# Patient Record
Sex: MALE | Race: WHITE | Employment: OTHER | URBAN - METROPOLITAN AREA
[De-identification: names, ages, dates, MRNs, and addresses within clinical notes are randomized per-mention and may not be internally consistent; named-entity substitution may affect disease eponyms.]

---

## 2017-02-06 ENCOUNTER — OFFICE VISIT - RIVER FALLS (OUTPATIENT)
Dept: FAMILY MEDICINE | Facility: CLINIC | Age: 67
End: 2017-02-06

## 2017-02-06 ASSESSMENT — MIFFLIN-ST. JEOR: SCORE: 1460.71

## 2017-02-10 ENCOUNTER — OFFICE VISIT - RIVER FALLS (OUTPATIENT)
Dept: FAMILY MEDICINE | Facility: CLINIC | Age: 67
End: 2017-02-10

## 2017-05-10 ENCOUNTER — COMMUNICATION - RIVER FALLS (OUTPATIENT)
Dept: FAMILY MEDICINE | Facility: CLINIC | Age: 67
End: 2017-05-10

## 2017-05-10 ENCOUNTER — OFFICE VISIT - RIVER FALLS (OUTPATIENT)
Dept: FAMILY MEDICINE | Facility: CLINIC | Age: 67
End: 2017-05-10

## 2017-05-11 LAB — PSA SERPL-MCNC: 1.5 NG/ML

## 2017-05-16 LAB — PSA SERPL-MCNC: 1.5 NG/ML

## 2017-05-30 ENCOUNTER — OFFICE VISIT - RIVER FALLS (OUTPATIENT)
Dept: FAMILY MEDICINE | Facility: CLINIC | Age: 67
End: 2017-05-30

## 2017-06-28 ENCOUNTER — OFFICE VISIT - RIVER FALLS (OUTPATIENT)
Dept: FAMILY MEDICINE | Facility: CLINIC | Age: 67
End: 2017-06-28

## 2017-06-28 ASSESSMENT — MIFFLIN-ST. JEOR: SCORE: 1477.04

## 2017-08-30 ENCOUNTER — AMBULATORY - RIVER FALLS (OUTPATIENT)
Dept: FAMILY MEDICINE | Facility: CLINIC | Age: 67
End: 2017-08-30

## 2017-08-31 LAB — HBA1C MFR BLD: 7.4 %

## 2017-09-18 ENCOUNTER — OFFICE VISIT - RIVER FALLS (OUTPATIENT)
Dept: FAMILY MEDICINE | Facility: CLINIC | Age: 67
End: 2017-09-18

## 2017-09-18 ASSESSMENT — MIFFLIN-ST. JEOR: SCORE: 1432.59

## 2017-10-25 ENCOUNTER — OFFICE VISIT - RIVER FALLS (OUTPATIENT)
Dept: FAMILY MEDICINE | Facility: CLINIC | Age: 67
End: 2017-10-25

## 2017-10-25 ASSESSMENT — MIFFLIN-ST. JEOR: SCORE: 1410.82

## 2017-11-21 ENCOUNTER — OFFICE VISIT - RIVER FALLS (OUTPATIENT)
Dept: FAMILY MEDICINE | Facility: CLINIC | Age: 67
End: 2017-11-21

## 2017-12-20 ENCOUNTER — AMBULATORY - RIVER FALLS (OUTPATIENT)
Dept: FAMILY MEDICINE | Facility: CLINIC | Age: 67
End: 2017-12-20

## 2017-12-22 LAB
CHOLEST SERPL-MCNC: 143 MG/DL
CHOLEST/HDLC SERPL: 2.2 {RATIO}
CREAT SERPL-MCNC: 0.86 MG/DL (ref 0.7–1.25)
GLUCOSE BLD-MCNC: 142 MG/DL (ref 65–99)
HBA1C MFR BLD: 6.1 %
HDLC SERPL-MCNC: 64 MG/DL
LDLC SERPL CALC-MCNC: 62 MG/DL
NONHDLC SERPL-MCNC: 79 MG/DL
PSA SERPL-MCNC: 1 NG/ML
TRIGL SERPL-MCNC: 88 MG/DL

## 2017-12-26 ENCOUNTER — OFFICE VISIT - RIVER FALLS (OUTPATIENT)
Dept: FAMILY MEDICINE | Facility: CLINIC | Age: 67
End: 2017-12-26

## 2017-12-26 ASSESSMENT — MIFFLIN-ST. JEOR: SCORE: 1474.32

## 2018-01-02 ENCOUNTER — OFFICE VISIT - RIVER FALLS (OUTPATIENT)
Dept: FAMILY MEDICINE | Facility: CLINIC | Age: 68
End: 2018-01-02

## 2018-01-02 ASSESSMENT — MIFFLIN-ST. JEOR: SCORE: 1429.87

## 2018-01-05 ENCOUNTER — OFFICE VISIT - RIVER FALLS (OUTPATIENT)
Dept: FAMILY MEDICINE | Facility: CLINIC | Age: 68
End: 2018-01-05

## 2018-01-05 ASSESSMENT — MIFFLIN-ST. JEOR: SCORE: 1430.78

## 2018-01-19 ENCOUNTER — OFFICE VISIT - RIVER FALLS (OUTPATIENT)
Dept: FAMILY MEDICINE | Facility: CLINIC | Age: 68
End: 2018-01-19

## 2018-05-01 ENCOUNTER — OFFICE VISIT - RIVER FALLS (OUTPATIENT)
Dept: FAMILY MEDICINE | Facility: CLINIC | Age: 68
End: 2018-05-01

## 2018-05-01 ASSESSMENT — MIFFLIN-ST. JEOR: SCORE: 1417.17

## 2018-06-20 ENCOUNTER — OFFICE VISIT - RIVER FALLS (OUTPATIENT)
Dept: FAMILY MEDICINE | Facility: CLINIC | Age: 68
End: 2018-06-20
Payer: OTHER GOVERNMENT

## 2018-10-03 ENCOUNTER — OFFICE VISIT - RIVER FALLS (OUTPATIENT)
Dept: FAMILY MEDICINE | Facility: CLINIC | Age: 68
End: 2018-10-03

## 2018-10-03 ASSESSMENT — MIFFLIN-ST. JEOR: SCORE: 1409

## 2019-02-18 ENCOUNTER — AMBULATORY - RIVER FALLS (OUTPATIENT)
Dept: FAMILY MEDICINE | Facility: CLINIC | Age: 69
End: 2019-02-18

## 2019-02-19 LAB
BUN SERPL-MCNC: 13 MG/DL (ref 7–25)
BUN/CREAT RATIO - HISTORICAL: ABNORMAL (ref 6–22)
CALCIUM SERPL-MCNC: 9.5 MG/DL (ref 8.6–10.3)
CHLORIDE BLD-SCNC: 101 MMOL/L (ref 98–110)
CHOLEST SERPL-MCNC: 119 MG/DL
CHOLEST/HDLC SERPL: 2.2 {RATIO}
CO2 SERPL-SCNC: 32 MMOL/L (ref 20–32)
CREAT SERPL-MCNC: 0.82 MG/DL (ref 0.7–1.25)
EGFRCR SERPLBLD CKD-EPI 2021: 92 ML/MIN/1.73M2
ERYTHROCYTE [DISTWIDTH] IN BLOOD BY AUTOMATED COUNT: 12.4 % (ref 11–15)
GLUCOSE BLD-MCNC: 178 MG/DL (ref 65–99)
HBA1C MFR BLD: 7.8 %
HCT VFR BLD AUTO: 41.1 % (ref 38.5–50)
HDLC SERPL-MCNC: 55 MG/DL
HGB BLD-MCNC: 13.8 GM/DL (ref 13.2–17.1)
IRON SATURATION: 36 (ref 15–60)
IRON SERPL-MCNC: 121 MCG/DL (ref 50–180)
LDLC SERPL CALC-MCNC: 46 MG/DL
MCH RBC QN AUTO: 32.1 PG (ref 27–33)
MCHC RBC AUTO-ENTMCNC: 33.6 GM/DL (ref 32–36)
MCV RBC AUTO: 95.6 FL (ref 80–100)
NONHDLC SERPL-MCNC: 64 MG/DL
PLATELET # BLD AUTO: 285 10*3/UL (ref 140–400)
PMV BLD: 11.6 FL (ref 7.5–12.5)
POTASSIUM BLD-SCNC: 4.6 MMOL/L (ref 3.5–5.3)
PSA SERPL-MCNC: 0.7 NG/ML
RBC # BLD AUTO: 4.3 10*6/UL (ref 4.2–5.8)
SODIUM SERPL-SCNC: 139 MMOL/L (ref 135–146)
TIBC - QUEST: 332 (ref 250–425)
TRIGL SERPL-MCNC: 101 MG/DL
WBC # BLD AUTO: 5 10*3/UL (ref 3.8–10.8)

## 2019-02-21 ENCOUNTER — OFFICE VISIT - RIVER FALLS (OUTPATIENT)
Dept: FAMILY MEDICINE | Facility: CLINIC | Age: 69
End: 2019-02-21

## 2019-02-21 ASSESSMENT — MIFFLIN-ST. JEOR: SCORE: 1430.78

## 2019-06-03 ENCOUNTER — AMBULATORY - RIVER FALLS (OUTPATIENT)
Dept: FAMILY MEDICINE | Facility: CLINIC | Age: 69
End: 2019-06-03

## 2019-06-04 ENCOUNTER — COMMUNICATION - RIVER FALLS (OUTPATIENT)
Dept: FAMILY MEDICINE | Facility: CLINIC | Age: 69
End: 2019-06-04

## 2019-06-04 LAB — HBA1C MFR BLD: 6.9 %

## 2019-06-06 ENCOUNTER — OFFICE VISIT - RIVER FALLS (OUTPATIENT)
Dept: FAMILY MEDICINE | Facility: CLINIC | Age: 69
End: 2019-06-06

## 2019-06-06 ASSESSMENT — MIFFLIN-ST. JEOR: SCORE: 1427.15

## 2019-07-08 ENCOUNTER — OFFICE VISIT - RIVER FALLS (OUTPATIENT)
Dept: FAMILY MEDICINE | Facility: CLINIC | Age: 69
End: 2019-07-08

## 2019-07-08 ASSESSMENT — MIFFLIN-ST. JEOR: SCORE: 1440.76

## 2019-08-15 ENCOUNTER — OFFICE VISIT - RIVER FALLS (OUTPATIENT)
Dept: FAMILY MEDICINE | Facility: CLINIC | Age: 69
End: 2019-08-15

## 2019-11-05 ENCOUNTER — COMMUNICATION - RIVER FALLS (OUTPATIENT)
Dept: FAMILY MEDICINE | Facility: CLINIC | Age: 69
End: 2019-11-05

## 2019-12-16 ENCOUNTER — AMBULATORY - RIVER FALLS (OUTPATIENT)
Dept: FAMILY MEDICINE | Facility: CLINIC | Age: 69
End: 2019-12-16

## 2019-12-17 ENCOUNTER — COMMUNICATION - RIVER FALLS (OUTPATIENT)
Dept: FAMILY MEDICINE | Facility: CLINIC | Age: 69
End: 2019-12-17

## 2019-12-17 LAB — HBA1C MFR BLD: 7.4 %

## 2019-12-19 ENCOUNTER — OFFICE VISIT - RIVER FALLS (OUTPATIENT)
Dept: FAMILY MEDICINE | Facility: CLINIC | Age: 69
End: 2019-12-19

## 2019-12-19 ASSESSMENT — MIFFLIN-ST. JEOR: SCORE: 1453.46

## 2020-03-12 ENCOUNTER — AMBULATORY - RIVER FALLS (OUTPATIENT)
Dept: FAMILY MEDICINE | Facility: CLINIC | Age: 70
End: 2020-03-12

## 2020-03-19 ENCOUNTER — COMMUNICATION - RIVER FALLS (OUTPATIENT)
Dept: FAMILY MEDICINE | Facility: CLINIC | Age: 70
End: 2020-03-19

## 2020-06-04 ENCOUNTER — COMMUNICATION - RIVER FALLS (OUTPATIENT)
Dept: FAMILY MEDICINE | Facility: CLINIC | Age: 70
End: 2020-06-04

## 2020-06-11 ENCOUNTER — COMMUNICATION - RIVER FALLS (OUTPATIENT)
Dept: FAMILY MEDICINE | Facility: CLINIC | Age: 70
End: 2020-06-11

## 2020-06-11 ENCOUNTER — AMBULATORY - RIVER FALLS (OUTPATIENT)
Dept: FAMILY MEDICINE | Facility: CLINIC | Age: 70
End: 2020-06-11

## 2020-06-11 ASSESSMENT — MIFFLIN-ST. JEOR: SCORE: 1464.34

## 2020-06-12 ENCOUNTER — COMMUNICATION - RIVER FALLS (OUTPATIENT)
Dept: FAMILY MEDICINE | Facility: CLINIC | Age: 70
End: 2020-06-12

## 2020-06-12 LAB
B BURGDOR IGG+IGM SER QL: <0.9
BUN SERPL-MCNC: 14 MG/DL (ref 7–25)
BUN/CREAT RATIO - HISTORICAL: ABNORMAL (ref 6–22)
CALCIUM SERPL-MCNC: 9.7 MG/DL (ref 8.6–10.3)
CHLORIDE BLD-SCNC: 98 MMOL/L (ref 98–110)
CHOLEST SERPL-MCNC: 135 MG/DL
CHOLEST/HDLC SERPL: 2.6 {RATIO}
CO2 SERPL-SCNC: 34 MMOL/L (ref 20–32)
CREAT SERPL-MCNC: 0.93 MG/DL (ref 0.7–1.25)
EGFRCR SERPLBLD CKD-EPI 2021: 84 ML/MIN/1.73M2
ERYTHROCYTE [DISTWIDTH] IN BLOOD BY AUTOMATED COUNT: 12.3 % (ref 11–15)
GLUCOSE BLD-MCNC: 208 MG/DL (ref 65–99)
HBA1C MFR BLD: 8.4 %
HCT VFR BLD AUTO: 38.8 % (ref 38.5–50)
HDLC SERPL-MCNC: 52 MG/DL
HGB BLD-MCNC: 13.3 GM/DL (ref 13.2–17.1)
LDLC SERPL CALC-MCNC: 62 MG/DL
MCH RBC QN AUTO: 31.6 PG (ref 27–33)
MCHC RBC AUTO-ENTMCNC: 34.3 GM/DL (ref 32–36)
MCV RBC AUTO: 92.2 FL (ref 80–100)
NONHDLC SERPL-MCNC: 83 MG/DL
PLATELET # BLD AUTO: 261 10*3/UL (ref 140–400)
PMV BLD: 11.6 FL (ref 7.5–12.5)
POTASSIUM BLD-SCNC: 4.9 MMOL/L (ref 3.5–5.3)
PSA SERPL-MCNC: 0.7 NG/ML
RBC # BLD AUTO: 4.21 10*6/UL (ref 4.2–5.8)
SODIUM SERPL-SCNC: 138 MMOL/L (ref 135–146)
TRIGL SERPL-MCNC: 130 MG/DL
TSH SERPL DL<=0.005 MIU/L-ACNC: 0.72 MIU/L (ref 0.4–4.5)
WBC # BLD AUTO: 5.2 10*3/UL (ref 3.8–10.8)

## 2020-06-24 ENCOUNTER — OFFICE VISIT - RIVER FALLS (OUTPATIENT)
Dept: FAMILY MEDICINE | Facility: CLINIC | Age: 70
End: 2020-06-24

## 2020-06-24 ASSESSMENT — MIFFLIN-ST. JEOR: SCORE: 1448.92

## 2020-09-03 ENCOUNTER — COMMUNICATION - RIVER FALLS (OUTPATIENT)
Dept: FAMILY MEDICINE | Facility: CLINIC | Age: 70
End: 2020-09-03

## 2020-09-03 ENCOUNTER — OFFICE VISIT - RIVER FALLS (OUTPATIENT)
Dept: FAMILY MEDICINE | Facility: CLINIC | Age: 70
End: 2020-09-03

## 2020-09-03 ASSESSMENT — MIFFLIN-ST. JEOR: SCORE: 1444.39

## 2020-09-21 ENCOUNTER — AMBULATORY - RIVER FALLS (OUTPATIENT)
Dept: FAMILY MEDICINE | Facility: CLINIC | Age: 70
End: 2020-09-21

## 2020-09-22 ENCOUNTER — COMMUNICATION - RIVER FALLS (OUTPATIENT)
Dept: FAMILY MEDICINE | Facility: CLINIC | Age: 70
End: 2020-09-22

## 2020-09-22 LAB — HBA1C MFR BLD: 6.6 %

## 2020-09-23 ENCOUNTER — OFFICE VISIT - RIVER FALLS (OUTPATIENT)
Dept: FAMILY MEDICINE | Facility: CLINIC | Age: 70
End: 2020-09-23

## 2020-09-23 ASSESSMENT — MIFFLIN-ST. JEOR: SCORE: 1453.46

## 2021-02-10 ENCOUNTER — OFFICE VISIT - RIVER FALLS (OUTPATIENT)
Dept: FAMILY MEDICINE | Facility: CLINIC | Age: 71
End: 2021-02-10

## 2021-02-12 ENCOUNTER — AMBULATORY - RIVER FALLS (OUTPATIENT)
Dept: FAMILY MEDICINE | Facility: CLINIC | Age: 71
End: 2021-02-12

## 2021-02-12 ENCOUNTER — OFFICE VISIT - RIVER FALLS (OUTPATIENT)
Dept: FAMILY MEDICINE | Facility: CLINIC | Age: 71
End: 2021-02-12

## 2021-02-15 LAB — SARS-COV-2 RNA RESP QL NAA+PROBE: POSITIVE

## 2021-02-24 ENCOUNTER — OFFICE VISIT - RIVER FALLS (OUTPATIENT)
Dept: FAMILY MEDICINE | Facility: CLINIC | Age: 71
End: 2021-02-24

## 2021-04-06 ENCOUNTER — AMBULATORY - RIVER FALLS (OUTPATIENT)
Dept: FAMILY MEDICINE | Facility: CLINIC | Age: 71
End: 2021-04-06

## 2021-04-07 ENCOUNTER — COMMUNICATION - RIVER FALLS (OUTPATIENT)
Dept: FAMILY MEDICINE | Facility: CLINIC | Age: 71
End: 2021-04-07

## 2021-04-07 LAB
B BURGDOR IGG+IGM SER QL: <0.9
HBA1C MFR BLD: 7.4 %

## 2021-04-12 ENCOUNTER — OFFICE VISIT - RIVER FALLS (OUTPATIENT)
Dept: FAMILY MEDICINE | Facility: CLINIC | Age: 71
End: 2021-04-12

## 2021-04-12 ASSESSMENT — MIFFLIN-ST. JEOR: SCORE: 1461.17

## 2021-06-09 ENCOUNTER — OFFICE VISIT - RIVER FALLS (OUTPATIENT)
Dept: FAMILY MEDICINE | Facility: CLINIC | Age: 71
End: 2021-06-09

## 2021-06-09 ASSESSMENT — MIFFLIN-ST. JEOR: SCORE: 1408.55

## 2021-06-11 ENCOUNTER — COMMUNICATION - RIVER FALLS (OUTPATIENT)
Dept: FAMILY MEDICINE | Facility: CLINIC | Age: 71
End: 2021-06-11

## 2021-06-11 LAB
BACTERIA SPEC CULT: NORMAL
PSA SERPL-MCNC: 0.4 NG/ML

## 2021-07-12 ENCOUNTER — OFFICE VISIT - RIVER FALLS (OUTPATIENT)
Dept: FAMILY MEDICINE | Facility: CLINIC | Age: 71
End: 2021-07-12

## 2021-07-12 ASSESSMENT — MIFFLIN-ST. JEOR: SCORE: 1443.93

## 2021-07-19 ENCOUNTER — OFFICE VISIT - RIVER FALLS (OUTPATIENT)
Dept: FAMILY MEDICINE | Facility: CLINIC | Age: 71
End: 2021-07-19

## 2021-07-19 ASSESSMENT — MIFFLIN-ST. JEOR: SCORE: 1396.76

## 2021-09-20 ENCOUNTER — COMMUNICATION - RIVER FALLS (OUTPATIENT)
Dept: FAMILY MEDICINE | Facility: CLINIC | Age: 71
End: 2021-09-20

## 2021-09-23 ENCOUNTER — AMBULATORY - RIVER FALLS (OUTPATIENT)
Dept: FAMILY MEDICINE | Facility: CLINIC | Age: 71
End: 2021-09-23

## 2021-10-30 ENCOUNTER — AMBULATORY - RIVER FALLS (OUTPATIENT)
Dept: FAMILY MEDICINE | Facility: CLINIC | Age: 71
End: 2021-10-30

## 2021-10-31 LAB
BUN SERPL-MCNC: 15 MG/DL (ref 7–25)
BUN/CREAT RATIO - HISTORICAL: ABNORMAL (ref 6–22)
CALCIUM SERPL-MCNC: 9.5 MG/DL (ref 8.6–10.3)
CHLORIDE BLD-SCNC: 99 MMOL/L (ref 98–110)
CHOLEST SERPL-MCNC: 148 MG/DL
CHOLEST/HDLC SERPL: 2.2 {RATIO}
CO2 SERPL-SCNC: 31 MMOL/L (ref 20–32)
CREAT SERPL-MCNC: 0.91 MG/DL (ref 0.7–1.18)
CREAT UR-MCNC: 77 MG/DL (ref 20–320)
EGFRCR SERPLBLD CKD-EPI 2021: 85 ML/MIN/1.73M2
ERYTHROCYTE [DISTWIDTH] IN BLOOD BY AUTOMATED COUNT: 11.7 % (ref 11–15)
GLUCOSE BLD-MCNC: 183 MG/DL (ref 65–99)
HBA1C MFR BLD: 7.4 %
HCT VFR BLD AUTO: 40.4 % (ref 38.5–50)
HDLC SERPL-MCNC: 66 MG/DL
HGB BLD-MCNC: 13.7 GM/DL (ref 13.2–17.1)
IRON SATURATION: 23 (ref 20–48)
IRON SERPL-MCNC: 86 MCG/DL (ref 50–180)
LDLC SERPL CALC-MCNC: 66 MG/DL
MCH RBC QN AUTO: 32.7 PG (ref 27–33)
MCHC RBC AUTO-ENTMCNC: 33.9 GM/DL (ref 32–36)
MCV RBC AUTO: 96.4 FL (ref 80–100)
MICROALBUMIN UR-MCNC: 0.5 MG/DL
MICROALBUMIN/CREAT UR: 6 MG/G{CREAT}
NONHDLC SERPL-MCNC: 82 MG/DL
PLATELET # BLD AUTO: 270 10*3/UL (ref 140–400)
PMV BLD: 11.4 FL (ref 7.5–12.5)
POTASSIUM BLD-SCNC: 4.7 MMOL/L (ref 3.5–5.3)
PSA SERPL-MCNC: 0.66 NG/ML
RBC # BLD AUTO: 4.19 10*6/UL (ref 4.2–5.8)
SODIUM SERPL-SCNC: 136 MMOL/L (ref 135–146)
TIBC - QUEST: 369 (ref 250–425)
TRIGL SERPL-MCNC: 77 MG/DL
WBC # BLD AUTO: 5.8 10*3/UL (ref 3.8–10.8)

## 2021-11-01 ENCOUNTER — COMMUNICATION - RIVER FALLS (OUTPATIENT)
Dept: FAMILY MEDICINE | Facility: CLINIC | Age: 71
End: 2021-11-01

## 2021-11-01 ENCOUNTER — TRANSFERRED RECORDS (OUTPATIENT)
Dept: MULTI SPECIALTY CLINIC | Facility: CLINIC | Age: 71
End: 2021-11-01
Payer: OTHER GOVERNMENT

## 2021-11-01 LAB — RETINOPATHY: NORMAL

## 2021-11-02 ENCOUNTER — OFFICE VISIT - RIVER FALLS (OUTPATIENT)
Dept: FAMILY MEDICINE | Facility: CLINIC | Age: 71
End: 2021-11-02

## 2021-12-16 ENCOUNTER — COMMUNICATION - RIVER FALLS (OUTPATIENT)
Dept: FAMILY MEDICINE | Facility: CLINIC | Age: 71
End: 2021-12-16

## 2022-02-02 ENCOUNTER — OFFICE VISIT - RIVER FALLS (OUTPATIENT)
Dept: FAMILY MEDICINE | Facility: CLINIC | Age: 72
End: 2022-02-02
Payer: MEDICARE

## 2022-02-03 LAB
BUN SERPL-MCNC: 15 MG/DL (ref 7–25)
BUN/CREAT RATIO - HISTORICAL: ABNORMAL (ref 6–22)
CALCIUM SERPL-MCNC: 9.6 MG/DL (ref 8.6–10.3)
CHLORIDE BLD-SCNC: 95 MMOL/L (ref 98–110)
CO2 SERPL-SCNC: 31 MMOL/L (ref 20–32)
CREAT SERPL-MCNC: 0.87 MG/DL (ref 0.7–1.18)
EGFRCR SERPLBLD CKD-EPI 2021: 87 ML/MIN/1.73M2
ERYTHROCYTE [DISTWIDTH] IN BLOOD BY AUTOMATED COUNT: 12.4 % (ref 11–15)
GLUCOSE BLD-MCNC: 228 MG/DL (ref 65–99)
HCT VFR BLD AUTO: 41.2 % (ref 38.5–50)
HGB BLD-MCNC: 13.6 GM/DL (ref 13.2–17.1)
MCH RBC QN AUTO: 31.3 PG (ref 27–33)
MCHC RBC AUTO-ENTMCNC: 33 GM/DL (ref 32–36)
MCV RBC AUTO: 94.7 FL (ref 80–100)
PLATELET # BLD AUTO: 259 10*3/UL (ref 140–400)
PMV BLD: 12 FL (ref 7.5–12.5)
POTASSIUM BLD-SCNC: 4.9 MMOL/L (ref 3.5–5.3)
RBC # BLD AUTO: 4.35 10*6/UL (ref 4.2–5.8)
SODIUM SERPL-SCNC: 135 MMOL/L (ref 135–146)
TSH SERPL DL<=0.005 MIU/L-ACNC: 1.19 MIU/L (ref 0.4–4.5)
WBC # BLD AUTO: 5 10*3/UL (ref 3.8–10.8)

## 2022-02-10 ENCOUNTER — COMMUNICATION - RIVER FALLS (OUTPATIENT)
Dept: FAMILY MEDICINE | Facility: CLINIC | Age: 72
End: 2022-02-10
Payer: MEDICARE

## 2022-02-11 VITALS
SYSTOLIC BLOOD PRESSURE: 134 MMHG | HEIGHT: 67 IN | HEIGHT: 67 IN | SYSTOLIC BLOOD PRESSURE: 134 MMHG | OXYGEN SATURATION: 97 % | TEMPERATURE: 97.4 F | DIASTOLIC BLOOD PRESSURE: 75 MMHG | HEIGHT: 67 IN | SYSTOLIC BLOOD PRESSURE: 130 MMHG | HEART RATE: 76 BPM | TEMPERATURE: 97.6 F | DIASTOLIC BLOOD PRESSURE: 69 MMHG | HEART RATE: 78 BPM | WEIGHT: 149.5 LBS | DIASTOLIC BLOOD PRESSURE: 76 MMHG | WEIGHT: 159.9 LBS | HEART RATE: 73 BPM | BODY MASS INDEX: 23.46 KG/M2 | BODY MASS INDEX: 25.1 KG/M2 | BODY MASS INDEX: 23.87 KG/M2 | WEIGHT: 152.1 LBS

## 2022-02-11 VITALS
TEMPERATURE: 98.2 F | HEIGHT: 67 IN | TEMPERATURE: 102.6 F | BODY MASS INDEX: 26.15 KG/M2 | SYSTOLIC BLOOD PRESSURE: 136 MMHG | WEIGHT: 166.6 LBS | TEMPERATURE: 98.3 F | HEIGHT: 67 IN | DIASTOLIC BLOOD PRESSURE: 76 MMHG | WEIGHT: 156.4 LBS | HEART RATE: 80 BPM | WEIGHT: 157 LBS | HEIGHT: 67 IN | BODY MASS INDEX: 24.61 KG/M2 | HEART RATE: 78 BPM | DIASTOLIC BLOOD PRESSURE: 78 MMHG | SYSTOLIC BLOOD PRESSURE: 140 MMHG | OXYGEN SATURATION: 97 % | HEART RATE: 106 BPM | SYSTOLIC BLOOD PRESSURE: 140 MMHG | HEART RATE: 74 BPM | SYSTOLIC BLOOD PRESSURE: 124 MMHG | DIASTOLIC BLOOD PRESSURE: 84 MMHG | WEIGHT: 156.8 LBS | BODY MASS INDEX: 24.5 KG/M2 | DIASTOLIC BLOOD PRESSURE: 78 MMHG | BODY MASS INDEX: 24.64 KG/M2 | TEMPERATURE: 98.1 F

## 2022-02-11 VITALS
DIASTOLIC BLOOD PRESSURE: 72 MMHG | SYSTOLIC BLOOD PRESSURE: 120 MMHG | HEIGHT: 67 IN | WEIGHT: 157 LBS | BODY MASS INDEX: 24.64 KG/M2 | TEMPERATURE: 97.6 F | HEART RATE: 76 BPM

## 2022-02-11 VITALS
WEIGHT: 161 LBS | DIASTOLIC BLOOD PRESSURE: 76 MMHG | WEIGHT: 164.4 LBS | HEIGHT: 67 IN | HEART RATE: 76 BPM | BODY MASS INDEX: 25.8 KG/M2 | DIASTOLIC BLOOD PRESSURE: 73 MMHG | HEART RATE: 82 BPM | HEIGHT: 67 IN | TEMPERATURE: 97.1 F | BODY MASS INDEX: 25.27 KG/M2 | SYSTOLIC BLOOD PRESSURE: 117 MMHG | SYSTOLIC BLOOD PRESSURE: 122 MMHG

## 2022-02-11 VITALS
DIASTOLIC BLOOD PRESSURE: 70 MMHG | DIASTOLIC BLOOD PRESSURE: 76 MMHG | HEART RATE: 72 BPM | SYSTOLIC BLOOD PRESSURE: 122 MMHG | WEIGHT: 160 LBS | RESPIRATION RATE: 16 BRPM | TEMPERATURE: 97.4 F | BODY MASS INDEX: 25.43 KG/M2 | WEIGHT: 162 LBS | SYSTOLIC BLOOD PRESSURE: 124 MMHG | HEART RATE: 70 BPM | BODY MASS INDEX: 25.11 KG/M2 | TEMPERATURE: 97.5 F | HEIGHT: 67 IN | HEIGHT: 67 IN

## 2022-02-11 VITALS
OXYGEN SATURATION: 99 % | SYSTOLIC BLOOD PRESSURE: 122 MMHG | BODY MASS INDEX: 25.69 KG/M2 | DIASTOLIC BLOOD PRESSURE: 74 MMHG | WEIGHT: 163.7 LBS | TEMPERATURE: 97.3 F | HEIGHT: 67 IN | BODY MASS INDEX: 23.84 KG/M2 | HEIGHT: 67 IN | HEART RATE: 76 BPM

## 2022-02-11 VITALS
SYSTOLIC BLOOD PRESSURE: 122 MMHG | OXYGEN SATURATION: 99 % | WEIGHT: 161 LBS | TEMPERATURE: 97.2 F | SYSTOLIC BLOOD PRESSURE: 122 MMHG | BODY MASS INDEX: 24.99 KG/M2 | RESPIRATION RATE: 16 BRPM | BODY MASS INDEX: 25.22 KG/M2 | DIASTOLIC BLOOD PRESSURE: 66 MMHG | DIASTOLIC BLOOD PRESSURE: 64 MMHG | TEMPERATURE: 97.6 F | HEART RATE: 76 BPM | HEART RATE: 84 BPM | HEART RATE: 74 BPM | BODY MASS INDEX: 24.52 KG/M2 | HEIGHT: 67 IN | SYSTOLIC BLOOD PRESSURE: 106 MMHG | WEIGHT: 156.2 LBS | DIASTOLIC BLOOD PRESSURE: 76 MMHG | TEMPERATURE: 97.5 F | WEIGHT: 159.2 LBS | HEIGHT: 67 IN

## 2022-02-11 VITALS
SYSTOLIC BLOOD PRESSURE: 116 MMHG | HEIGHT: 67 IN | TEMPERATURE: 97.2 F | DIASTOLIC BLOOD PRESSURE: 74 MMHG | WEIGHT: 163.6 LBS | HEART RATE: 80 BPM | BODY MASS INDEX: 25.68 KG/M2

## 2022-02-11 VITALS
BODY MASS INDEX: 24.17 KG/M2 | TEMPERATURE: 97.2 F | HEART RATE: 100 BPM | WEIGHT: 154 LBS | HEIGHT: 67 IN | OXYGEN SATURATION: 97 % | DIASTOLIC BLOOD PRESSURE: 68 MMHG | RESPIRATION RATE: 16 BRPM | SYSTOLIC BLOOD PRESSURE: 126 MMHG

## 2022-02-11 VITALS
WEIGHT: 152.2 LBS | HEIGHT: 67 IN | TEMPERATURE: 97.6 F | SYSTOLIC BLOOD PRESSURE: 120 MMHG | HEART RATE: 72 BPM | DIASTOLIC BLOOD PRESSURE: 74 MMHG | BODY MASS INDEX: 23.89 KG/M2

## 2022-02-11 VITALS
DIASTOLIC BLOOD PRESSURE: 66 MMHG | BODY MASS INDEX: 23.95 KG/M2 | BODY MASS INDEX: 24.71 KG/M2 | SYSTOLIC BLOOD PRESSURE: 124 MMHG | SYSTOLIC BLOOD PRESSURE: 114 MMHG | WEIGHT: 152.6 LBS | HEART RATE: 74 BPM | WEIGHT: 157.4 LBS | TEMPERATURE: 98.3 F | HEIGHT: 67 IN | HEART RATE: 76 BPM | HEIGHT: 67 IN | DIASTOLIC BLOOD PRESSURE: 64 MMHG | TEMPERATURE: 97.1 F

## 2022-02-11 VITALS
HEIGHT: 67 IN | TEMPERATURE: 97.2 F | SYSTOLIC BLOOD PRESSURE: 120 MMHG | WEIGHT: 167.2 LBS | HEART RATE: 80 BPM | BODY MASS INDEX: 26.24 KG/M2 | DIASTOLIC BLOOD PRESSURE: 70 MMHG

## 2022-02-11 VITALS — BODY MASS INDEX: 25.22 KG/M2 | HEIGHT: 67 IN

## 2022-02-11 VITALS
TEMPERATURE: 97.9 F | BODY MASS INDEX: 25.43 KG/M2 | HEIGHT: 67 IN | WEIGHT: 162 LBS | SYSTOLIC BLOOD PRESSURE: 128 MMHG | DIASTOLIC BLOOD PRESSURE: 80 MMHG | HEART RATE: 72 BPM

## 2022-02-11 VITALS
WEIGHT: 164.4 LBS | DIASTOLIC BLOOD PRESSURE: 77 MMHG | BODY MASS INDEX: 25.75 KG/M2 | HEART RATE: 84 BPM | SYSTOLIC BLOOD PRESSURE: 142 MMHG

## 2022-02-11 VITALS
TEMPERATURE: 97.9 F | SYSTOLIC BLOOD PRESSURE: 124 MMHG | HEART RATE: 80 BPM | BODY MASS INDEX: 25.65 KG/M2 | WEIGHT: 163.8 LBS | DIASTOLIC BLOOD PRESSURE: 78 MMHG

## 2022-02-16 NOTE — TELEPHONE ENCOUNTER
Entered by Marilynn Parekh LPN on December 03, 2021 1:30:36 PM CST  ---------------------  From: Marilynn Parekh LPN   To: [facility]    Sent: 12/3/2021 1:30:36 PM CST  Subject: Medication Management     ** Not Approved: Patient needs appointment **  modafinil (MODAFINIL 200MG TABLET)  TAKE 1 TABLET BY MOUTH EVERY MORNING **[facility]  Qty:  30 tab(s)        Days Supply:  30        Refills:  0          Substitutions Allowed     Route To Pharmacy - [facility]   Signed by Marilynn Parekh LPN            Per [facility] note 11/5/21 pt needs appt for consult with KAMINI or NOHELIA.    ------------------------------------------  From: [employer]  To: Kolton Majano MD  Sent: November 29, 2021 3:17:37 PM CST  Subject: Medication Management  Due: November 16, 2021 3:37:48 PM CST     ** On Hold Pending Signature **     Drug: modafinil (modafinil 200 mg oral tablet), TAKE 1 TABLET BY MOUTH EVERY MORNING **[facility]  Quantity: 30 tab(s)  Days Supply: 30  Refills: 0  Substitutions Allowed  Notes from Pharmacy:     Dispensed Drug: modafinil (modafinil 200 mg oral tablet), TAKE 1 TABLET BY MOUTH EVERY MORNING **[facility]  Quantity: 30 tab(s)  Days Supply: 30  Refills: 0  Substitutions Allowed  Notes from Pharmacy:  ------------------------------------------

## 2022-02-16 NOTE — TELEPHONE ENCOUNTER
---------------------  From: Teresa Reddy CMA (eRx Pool ([id number]_[facility]))   To: [facility] Message Pool (32224_[facility]);     Sent: 2/11/2021 8:06:10 AM CST  Subject: FW: Medication Management   Due Date/Time: 2/11/2021 9:50:00 AM CST           Entered by Teresa Reddy CMA on February 11, 2021 8:05:53 AM CST    PCP:  JESSICA    Date of last office visit and reason:  Cough 2/10/2021    Return to Clinic order placed? 6/2021         ------------------------------------------  From: [employer]  To: Misael Crow PA-C  Sent: February 10, 2021 9:50:53 AM CST  Subject: Medication Management  Due: January 30, 2021 4:21:54 PM CST     ** On Hold Pending Signature **     Drug: hydrocortisone/neomycin/polymyxin B otic (hydrocortisone/neomycin/polymyxin B 1%-0.35%-10,000 units/mL otic suspension), INSTILL FOUR DROPS IN EACH EAR THREE TIMES A DAY FOR SEVEN DAY(S)  Quantity: 10 mL  Days Supply: 7  Refills: 0  Substitutions Allowed  Notes from Pharmacy:     Dispensed Drug: hydrocortisone/neomycin/polymyxin B otic (hydrocortisone/neomycin/polymyxin B 1%-0.35%-10,000 units/mL otic suspension), INSTILL FOUR DROPS IN EACH EAR THREE TIMES A DAY FOR SEVEN DAY(S)  Quantity: 10 mL  Days Supply: 7  Refills: 0  Substitutions Allowed  Notes from Pharmacy:  ---------------------------------------------------------------  From: Alberto Palumbo CMA ([facility] Message Pool ([id number]_[facility]))   To: Misael Crow PA-C;     Sent: 2/11/2021 11:19:42 AM CST  Subject: FW: Medication Management   Due Date/Time: 2/11/2021 9:50:00 AM CST---------------------  From: Tristin MORELOS, Misael CHAKRABORTY   To: [facility]    Sent: 2/11/2021 11:43:44 AM CST  Subject: FW: Medication Management     ** Approved with modifications: **  hydrocortisone/neomycin/polymyxin B otic (NEOMYCIN/POLYMYXIN/HYDROCORTISONE 1% OTIC SUSPENSION)  INSTILL FOUR DROPS IN EACH EAR THREE TIMES A DAY FOR SEVEN DAY(S)  Qty:  10 mL        Days Supply:  7        Refills:  0           Substitutions Allowed     Route To Pharmacy - [facility]

## 2022-02-16 NOTE — TELEPHONE ENCOUNTER
Entered by Alberto Palumbo CMA on June 04, 2020 9:23:05 AM CDT  PCP:   ASHVIN    Medication:   lomotil  Last Filled:  12/3/19    Quantity:  90  Refills:  5    Date of last office visit and reason:   12/19/19 medication management    Date of last Med Check / Px:     Date of last labs pertaining to condition:      Note:  Please advise on refills.  Alberto Palumbo CMa    Return to Clinic order placed?  yes due for DM check 10/2020    Resource:   _  Phone:   _  Fax:  _          ** Patient matched by Alberto Palumbo CMA on 6/4/2020 9:20:05 AM CDT **      ------------------------------------------  From: [employer]  To: Kolton Majano MD  Sent: Rosaura 3, 2020 3:09:19 PM CDT  Subject: Medication Management  Due: May 30, 2020 3:27:43 PM CDT     ** On Hold Pending Signature **     Dispensed Drug: atropine-diphenoxylate (atropine-diphenoxylate 0.025 mg-2.5 mg oral tablet), TAKE 1 TABLET BY MOUTH 3 TIMES A DAY  Quantity: 90 unknown unit  Days Supply: 0  Refills: 0  Substitutions Allowed  Notes from Pharmacy:  ---------------------------------------------------------------  From: Alberto Palumbo CMA (eRx Pool ([id number]_[place]))   To: [facility] Message Pool ([facility]);     Sent: 6/4/2020 9:23:18 AM CDT  Subject: FW: Medication Management   Due Date/Time: 6/4/2020 3:09:00 PM CDT---------------------  From: Keisha Jolly ([employer] Message Pool ([id number]_[place] [facility] [place]))   To: Kolton Majano MD;     Sent: 6/4/2020 11:27:54 AM CDT  Subject: FW: Medication Management   Due Date/Time: 6/4/2020 3:09:00 PM CDT---------------------  From: Kolton Majano MD   To: [facility] [email or web address] Pool ([id number]_[place] [facility] [place]);     Sent: 6/4/2020 11:43:42 AM CDT  Subject: RE: Medication Management     Prescription sent to pharmacy

## 2022-02-16 NOTE — TELEPHONE ENCOUNTER
---------------------  From: Jennifer Crisostomo   To: Kolton Majano MD;     Cc: Blanca Chowdhury CMA;      Sent: 3/5/2020 10:05:04 AM CST  Subject: General Message     Patient received a reminder letter stating that he is due for a CXR and would like to have today.  Please place an order.---------------------  From: Kolton Majano MD   To: Jennifer Crisostomo; Blanca Chowdhury CMA;     Sent: 3/5/2020 10:25:14 AM CST  Subject: RE: General Message     Theodora  Order placed. Please call patientorder was brought to referrals.       RUDDY Chowdhury CMA

## 2022-02-16 NOTE — NURSING NOTE
Patient called at 8:42 asking if he needed fasting labs for his Nov appointment with ASHVIN.  I reviewed the chart and it showed he only needed an A1c.  Informed patient at 8:45.

## 2022-02-16 NOTE — NURSING NOTE
Comprehensive Intake Entered On:  12/19/2019 3:09 PM CST    Performed On:  12/19/2019 3:02 PM CST by Qian ESPINAL, Ina               Summary   Chief Complaint :   med check, review labs.   Weight Measured :   162 lb(Converted to: 162 lb 0 oz, 73.48 kg)    Height Measured :   67 in(Converted to: 5 ft 7 in, 170.18 cm)    Body Mass Index :   25.37 kg/m2 (HI)    Body Surface Area :   1.86 m2   Systolic Blood Pressure :   128 mmHg   Diastolic Blood Pressure :   80 mmHg   Mean Arterial Pressure :   96 mmHg   Peripheral Pulse Rate :   72 bpm   BP Site :   Right arm   Pulse Site :   Radial artery   BP Method :   Manual   HR Method :   Manual   Temperature Tympanic :   97.9 DegF(Converted to: 36.6 DegC)    Ina Laughlin MA - 12/19/2019 3:02 PM CST   Health Status   Allergies Verified? :   Yes   Medication History Verified? :   Yes   Immunizations Current :   No   Medical History Verified? :   Yes   Pre-Visit Planning Status :   Completed   Tobacco Use? :   Current every day smoker   Ina Laughlin MA - 12/19/2019 3:02 PM CST   Consents   Consent for Immunization Exchange :   Consent Granted   Consent for Immunizations to Providers :   Consent Granted   Ina Laughlin MA - 12/19/2019 3:02 PM CST   Meds / Allergies   (As Of: 12/19/2019 3:09:20 PM CST)   Allergies (Active)   No known allergies  Estimated Onset Date:   Unspecified ; Created By:   Gena Adan LPN; Reaction Status:   Active ; Category:   Drug ; Substance:   No known allergies ; Type:   Allergy ; Updated By:   Gena Adan LPN; Reviewed Date:   6/6/2019 2:58 PM CDT        Medication List   (As Of: 12/19/2019 3:09:20 PM CST)   Prescription/Discharge Order    atropine-diphenoxylate  :   atropine-diphenoxylate ; Status:   Prescribed ; Ordered As Mnemonic:   Lomotil 2.5 mg-0.025 mg oral tablet ; Simple Display Line:   1 tab(s), po, tid, 90 tab(s), 5 Refill(s) ; Ordering Provider:   Kolton Majano MD; Catalog Code:   atropine-diphenoxylate ; Order Dt/Tm:    12/3/2019 3:55:22 PM CST          bromocriptine  :   bromocriptine ; Status:   Prescribed ; Ordered As Mnemonic:   bromocriptine 0.8 mg oral tablet ; Simple Display Line:   0.8 mg, 1 tab(s), po, qam, 95 tab(s), 3 Refill(s) ; Ordering Provider:   Kolton Majano MD; Catalog Code:   bromocriptine ; Order Dt/Tm:   2/21/2019 3:08:16 PM CST          cholestyramine  :   cholestyramine ; Status:   Completed ; Ordered As Mnemonic:   Prevalite 4 g/5.5 g oral powder for reconstitution ; Simple Display Line:   4 gm, po, daily, 90 EA, 3 Refill(s) ; Ordering Provider:   Kolton Majnao MD; Catalog Code:   cholestyramine ; Order Dt/Tm:   9/18/2017 4:44:38 PM CDT          dutasteride  :   dutasteride ; Status:   Prescribed ; Ordered As Mnemonic:   Avodart 0.5 mg oral capsule ; Simple Display Line:   0.5 mg, 1 cap(s), PO, Daily, 30 cap(s), 1 Refill(s) ; Ordering Provider:   Kolton Majano MD; Catalog Code:   dutasteride ; Order Dt/Tm:   8/30/2017 7:29:29 PM CDT          ipratropium nasal  :   ipratropium nasal ; Status:   Prescribed ; Ordered As Mnemonic:   Atrovent 42 mcg/inh nasal spray ; Simple Display Line:   2 spray(s), nasal, qid, PRN: for nasal congestion, 1 EA, 2 Refill(s) ; Ordering Provider:   Misael Crow PA-C; Catalog Code:   ipratropium nasal ; Order Dt/Tm:   5/1/2018 10:02:36 AM CDT          lidocaine topical  :   lidocaine topical ; Status:   Prescribed ; Ordered As Mnemonic:   lidocaine 5% topical ointment ; Simple Display Line:   1 nini, top, tid, 35 gm, 3 Refill(s) ; Ordering Provider:   Kolton Majano MD; Catalog Code:   lidocaine topical ; Order Dt/Tm:   2/21/2019 3:12:20 PM CST          metFORMIN  :   metFORMIN ; Status:   Prescribed ; Ordered As Mnemonic:   metFORMIN 500 mg oral tablet, extended release ; Simple Display Line:   4 tab(s), Oral, daily, 380 tab(s), 3 Refill(s) ; Ordering Provider:   Kolton Majano MD; Catalog Code:   metFORMIN ; Order Dt/Tm:   2/21/2019 3:07:51 PM CST          sildenafil  :    sildenafil ; Status:   Prescribed ; Ordered As Mnemonic:   sildenafil 20 mg oral tablet ; Simple Display Line:   2.5 tab(s), Oral, daily, 30-60 MINUTES PRIOR TO SEXUAL ACTIVITY., PRN: AS NEEDED, 10 tab(s), 10 Refill(s) ; Ordering Provider:   Kolton Majano MD; Catalog Code:   sildenafil ; Order Dt/Tm:   2/21/2019 3:07:32 PM CST          triamcinolone topical  :   triamcinolone topical ; Status:   Prescribed ; Ordered As Mnemonic:   triamcinolone 0.1% topical cream ; Simple Display Line:   1 nini, TOP, bid, use for no more than 2wks at a time, 60 gm, 1 Refill(s) ; Ordering Provider:   Kolton Majano MD; Catalog Code:   triamcinolone topical ; Order Dt/Tm:   9/18/2017 4:47:11 PM CDT            Home Meds    acetaminophen  :   acetaminophen ; Status:   Documented ; Ordered As Mnemonic:   acetaminophen ; Simple Display Line:   325 mg, po, q4 hrs, PRN: as needed for pain ; Catalog Code:   acetaminophen ; Order Dt/Tm:   7/1/2014 1:21:25 PM CDT          acetaminophen-oxycodone  :   acetaminophen-oxycodone ; Status:   Documented ; Ordered As Mnemonic:   acetaminophen-oxycodone 325 mg-5 mg oral tablet ; Simple Display Line:   1 tab(s), po, daily, PRN: as needed for pain ; Catalog Code:   acetaminophen-oxycodone ; Order Dt/Tm:   9/5/2012 2:11:41 PM CDT          biotin  :   biotin ; Status:   Documented ; Ordered As Mnemonic:   biotin 300 mcg oral tablet ; Simple Display Line:   300 mcg, 1 tab(s), po, daily ; Catalog Code:   biotin ; Order Dt/Tm:   7/1/2014 1:23:25 PM CDT          ibuprofen  :   ibuprofen ; Status:   Documented ; Ordered As Mnemonic:   ibuprofen 600 mg oral tablet ; Simple Display Line:   600 mg, 1 tab(s), po, daily, PRN: as needed for pain ; Catalog Code:   ibuprofen ; Order Dt/Tm:   9/5/2012 2:09:45 PM CDT          Miscellaneous Prescription  :   Miscellaneous Prescription ; Status:   Documented ; Ordered As Mnemonic:   Vitamin C ; Simple Display Line:   See Instructions, 1000mg tablet po bid ; Catalog  Code:   Miscellaneous Prescription ; Order Dt/Tm:   7/1/2014 1:22:31 PM CDT          modafinil  :   modafinil ; Status:   Documented ; Ordered As Mnemonic:   modafinil 200 mg oral tablet ; Simple Display Line:   200 mg, 1 tab(s), po, qam, PRN ; Catalog Code:   modafinil ; Order Dt/Tm:   2/6/2014 5:07:37 PM CST          venlafaxine  :   venlafaxine ; Status:   Documented ; Ordered As Mnemonic:   venlafaxine 75 mg oral capsule, extended release ; Simple Display Line:   75 mg, 1 cap(s), PO, Daily, 30 cap(s) ; Catalog Code:   venlafaxine ; Order Dt/Tm:   9/5/2012 2:09:18 PM CDT            Social History   Social History   (As Of: 12/19/2019 3:09:20 PM CST)   Alcohol:        Current, Beer (12 oz), 1-2 times per month, 2 drinks/episode average.   (Last Updated: 6/5/2014 6:36:23 PM CDT by Elsie Sellers CMA)          Tobacco:        Current, Snuff - 1/2 can per day, 25 year(s).   (Last Updated: 7/1/2014 1:00:11 PM CDT by Elsie Sellers CMA)          Substance Abuse:        Never   (Last Updated: 10/9/2012 9:09:03 AM CDT by Yaa Tillman)          Employment/School:        Retired, Work/School description: Lt. Col., [employer].   (Last Updated: 10/9/2012 9:10:02 AM CDT by Yaa Tillman)          Exercise:        Exercise frequency: 1-2 times/week.  Exercise type: Walking, Weight lifting.   (Last Updated: 10/9/2012 9:08:01 AM CDT by Yaa Tillman)          Sexual:        Sexually active: No.  Sexual orientation: Heterosexual.   (Last Updated: 10/9/2012 9:06:24 AM CDT by Yaa Tillman)

## 2022-02-16 NOTE — TELEPHONE ENCOUNTER
---------------------  From: Bety Neri CMA (eRx Pool (32224_[facility]))   To: [facility] Message Pool (32[facility]_[place] [facility] [place]);     Sent: 12/1/2020 7:11:39 AM CST  Subject: FW: Medication Management   Due Date/Time: 12/1/2020 2:43:00 PM CST           ------------------------------------------  From: [employer]  To: Kolton Majano MD  Sent: November 30, 2020 2:43:53 PM CST  Subject: Medication Management  Due: November 26, 2020 4:59:30 PM CST     ** On Hold Pending Signature **     Drug: atropine-diphenoxylate (Lomotil 2.5 mg-0.025 mg oral tablet), TAKE ONE TABLET BY MOUTH THREE TIMES A DAY  Quantity: 90 EA  Days Supply: 30  Refills: 5  Substitutions Allowed  Notes from Pharmacy:     Dispensed Drug: atropine-diphenoxylate (atropine-diphenoxylate 0.025 mg-2.5 mg oral tablet), TAKE ONE TABLET BY MOUTH THREE TIMES A DAY  Quantity: 90 EA  Days Supply: 30  Refills: 5  Substitutions Allowed  Notes from Pharmacy:  ------------------------------------------6/4/2020 for #90 refill 5, pt should have refills---------------------  From: Blanca Chowdhury CMA   To: [facility]    Sent: 12/7/2020 8:30:18 AM CST  Subject: FW: Medication Management     ** Not Approved: Patient has requested refill too soon, refilled 6/4/20 for #90 refill 5 **  atropine-diphenoxylate (DIPHENOXYLATE HYDROCHLORIDE/ATROPINE SULFATE  TABLET)  TAKE ONE TABLET BY MOUTH THREE TIMES A DAY  Qty:  90 EA        Days Supply:  30        Refills:  5          Substitutions Allowed     Route To Pharmacy - [facility]   Signed by Blanca Chowdhury CMA

## 2022-02-16 NOTE — PROGRESS NOTES
Patient:   NOY CRUZ            MRN: [id number]            FIN: [id number]               Age:   67 years     Sex:  Male     :  1950   Associated Diagnoses:   Chronic diarrhea; History of anemia; Diabetes Mellitus; Pruritus, Perianal   Author:   Kolton Majano MD      Visit Information      Date of Service: 10/25/2017 02:07 pm  Performing Location: [facility]  Encounter#: [id number]      Primary Care Provider (PCP):  Kolton Majano MD    NPI# [id number]      Referring Provider:  Kolton Majano MD    NPI# [id number]      Chief Complaint   10/25/2017 2:12 PM CDT   Wants to discuss medication.      History of Present Illness   Requests bromocriptine for diabetes. Brings in article talking about using it to help lower the HgbA1c with metformin. GI side effects common but no other significant concerns.      Tried quitting chew on own. Tried nicorrette. Wants to quit and concerned that chewing could have contributed to the colon polyps. Chews copehagen 1-2 cans a week. Chewing for 30 years. Used an herbal supplement and gum. Using nicorette lozenges.    Had hemorrhoids surgically fixed . Continues to have pain and itching intermittently but less frequently. When has rectal pain 1x a week triamcinolone 0.1% cream and lidocaine helps.    Had loose stools 3-4x a day until starting cholestyramine. Now has 1-2 stools usually formed. Still taking lomotil 6 tabs a day. Loose stools started following the right hemicolectomy . Still has gallbladder.    Takes percocet about 4-6 tabs a month for various chronic pains. [facility] Mejia)  Uses ibuprofen 3-4x a week.    Diabetes diagnosed in .  Everything hurts when walks a mile. Most pain in left achilles since injury .  Has lost 10 lbs recently  Takes modafinil for sleep apnea and [facility] by [facility]  Takes effexor  Prostate 8.5 in  with normal biopsies         Review of Systems   Constitutional:  No fever.     Respiratory:  No shortness of breath.    Cardiovascular:  No chest pain.    Gastrointestinal:  No vomiting.    PHQ-9: 7pts (September 2017). 2pts (February 2016)  Eczema in the winter  CAGE: 0pts and minimal alcohol  Has some occasional black stools and not taking iron      Health Status   Allergies:    Allergic Reactions (Selected)  No known allergies   Medications:  (Selected)   Prescriptions  Prescribed  Avodart 0.5 mg oral capsule: 1 cap(s) ( 0.5 mg ), PO, Daily, # 30 cap(s), 1 Refill(s), Type: Maintenance, Pharmacy: [facility], 1 cap(s) po daily  Lomotil 2.5 mg-0.025 mg oral tablet: 1-2 tab(s), po, qid, # 180 tab(s), 5 Refill(s), Type: Maintenance, Pharmacy: [facility], [place], 1-2 tab(s) po qid  Prevalite 4 g/5.5 g oral powder for reconstitution: ( 4 gm ), po, daily, # 90 EA, 3 Refill(s), Type: Maintenance, Pharmacy: [facility], 4 gm po daily  Viagra 100 mg oral tablet: 0.5 tab(s) ( 50 mg ), po, daily, Instructions: take when needed., # 10 tab(s), 3 Refill(s), Type: Maintenance, Pharmacy: [facility], 0.5 tab(s) po daily,Instr:take when needed.  lidocaine 5% topical ointment: 1 nini, top, tid, # 35 gm, 3 Refill(s), Type: Maintenance, Pharmacy: [facility], 1 nini top tid  metFORMIN 500 mg oral tablet, extended release: 4 tab(s) ( 2,000 mg ), po, daily, # 370 tab(s), 3 Refill(s), Type: Maintenance, Pharmacy: [facility], Pt is overdue for DM check and labs--must be seen for further refills., 4 tab(s) po daily  triamcinolone 0.1% topical cream: 1 nini, TOP, bid, Instructions: use for no more than 2wks at a time, # 60 gm, 1 Refill(s), Type: Maintenance, Pharmacy: [facility], 1 niin top bid,Instr:use for no more than 2wks at a time  Documented Medications  Documented  Vitamin C: Vitamin C, See Instructions, Instructions: 1000mg tablet po bid, Supply, 0 Refill(s), Type: Maintenance  acetaminophen-oxycodone 325 mg-5 mg oral tablet: 1 tab(s), po, daily, PRN: as needed for pain, 0 Refill(s), Type:  Maintenance  acetaminophen: ( 325 mg ), po, q4 hrs, PRN: as needed for pain, 0 Refill(s), Type: Maintenance  biotin 300 mcg oral tablet: 1 tab(s) ( 300 mcg ), po, daily, 0 Refill(s), Type: Maintenance  ibuprofen 600 mg oral tablet: 1 tab(s) ( 600 mg ), po, daily, PRN: as needed for pain, 0 Refill(s), Type: Maintenance  modafinil 200 mg oral tablet: 1 tab(s) ( 200 mg ), po, qam, Instructions: PRN, 0 Refill(s), Type: Maintenance  venlafaxine 75 mg oral capsule, extended release: 1 cap(s) ( 75 mg ), PO, Daily, # 30 cap(s), 0 Refill(s), Type: Maintenance   Problem list:    All Problems  Colon Polyps / SNOMED CT 026124403 / Confirmed  BPH (benign prostatic hypertrophy) / SNOMED CT 671227562 / Confirmed  Diabetes mellitus / SNOMED CT 986929225 / Confirmed  Refusal of statin medication by patient / SNOMED CT 494290552 / Confirmed  Resolved: *[facility]ospitalized@[facility] - Sessile serrated adenomas  Resolved: Tobacco user / ICD-9-.1      Histories   Family History: No colon cancer or polyps on Dad side but mom side unknown, Mom  age 93 from stroke/CHF; Dad  heart attack age 65 and had CABG in 50's; Brother is healthy   Procedure history:    Right hemicolectomy (SNOMED CT 435580621) performed by Nitesh Whitehead MD on 2014 at 63 Years.  Colonoscopy (SNOMED CT 881841898) performed by Shira Christian MD on 2009 at 58 Years.  Biopsy of prostate x 3 (SNOMED CT 403234278) in  at 58 Years.  Repair of tendon of upper limb (SNOMED CT 113065083) on 2002 at 51 Years.  Extraction of wisdom tooth (SNOMED CT 681870222) in  at 21 Years.   Social History: Retired [employer]. Currently chewing 3 cans a week down from 4-5      Physical Examination   Vital Signs   10/25/2017 2:12 PM CDT Temperature Tympanic 98.3 DegF    Peripheral Pulse Rate 74 bpm    Systolic Blood Pressure 114 mmHg    Diastolic Blood Pressure 64 mmHg      General:  Alert and oriented, No acute distress.    Neck:  No lymphadenopathy.    Respiratory:   Lungs are clear to auscultation.    Cardiovascular:  Normal rate, Regular rhythm, No edema.    Gastrointestinal:  Soft, Non-tender, Non-distended.    Musculoskeletal:  Normal gait.    Neurologic:  No focal deficits, normal monofilament testing.       Impression and Plan   Diagnosis     Chronic diarrhea (EEI99-HT K52.9).     History of anemia (PDY89-QT Z86.2).     Diabetes Mellitus (ZVF81-KO E11.9).     Pruritus, Perianal (TNN06-AL L29.0).       Diabetes: Desires addition of bromocriptine. Continue metformin 2000mg daily. HgbA1c 7.4% (August 2017). Discussed statin given diabetes even with low LDL (has declined). Restarted aspirin    Anal pruritis: lidocaine has worked well and refilled. Uses 1-2x a week  Diarrhea: continue cholestyramine once daily and lomotil 6x daily  Anemia: has corrected with normal iron stores. He stopped daily aspirin as may have been some gastritis. Will monitor CBC  Colon cancer screening: had right hemicolectomy for large [id number]. Normal colonoscopy 2009. Two colonoscopy prior to 2009. Recommend repeat June 2017 and schedule  Asbestosis exposure: [place] in the . CXR 2017 negative and repeat 2020  Eczema in the winter and triamcinolone helps  Decreased energy: hemoglobin and TSH have been normal. Possible chronic fatigue syndrome (started in 2007)  Smoking: starting to use the nicotene lozenges  Monitor weight  PSA 0.70-1.42 since 2012. Last check 1.86 ([facility])    Reviewed [place] [facility] Monitoring Program (September 2017)

## 2022-02-16 NOTE — PROGRESS NOTES
Chief Complaint  Follow up on facial cancer  History of Present Illness  The lesion in front of the right ear treated with cryosurgery a month ago. ?Lesion feels gone.  Review of Systems  Itching decreased in the left ear. Had a lesion in left ear from trauma getting smaller.  Back and left shoulder having been hurting for past few weeks. Feels more tired. ?Denies fevers.  Takes modafinil to stay alert for long drives  Problem List/Past Medical History  Ongoing  BPH  Colon Polyps  Diabetes Mellitus  Resolved  *Hospitalized@[facility] - Sessile serrated adenomas  Tobacco user  Procedure/Surgical History  Right hemicolectomy (06/26/2014),  Colonoscopy (01/27/2009),  Biopsy of prostate (2008),  Repair of tendon of upper limb (02/13/2002),  Extraction of wisdom tooth (1971).  Home Medications  acetaminophen, 325 mg, po, q4 hrs, PRN  acetaminophen-oxycodone 325 mg-5 mg oral tablet, 1 tab(s), po, daily, PRN  Avodart 0.5 mg oral capsule, 0.5 mg, 1 cap(s), po, daily  biotin 300 mcg oral tablet, 300 mcg, 1 tab(s), po, daily  ibuprofen 600 mg oral tablet, 600 mg, 1 tab(s), po, daily, PRN  lidocaine 5% topical ointment, 1 nini, top, tid, 3 refills  Lomotil 2.5 mg-0.025 mg oral tablet, 1-2 tab(s), po, qid, 5 refills  metFORMIN 500 mg oral tablet, extended release, 1500 mg, 3 tab(s), po, daily, 3 refills  modafinil 200 mg oral tablet, 200 mg, 1 tab(s), po, qam  Prevalite 4 g/5.5 g oral powder for reconstitution, 4 gm, po, daily, 3 refills  triamcinolone 0.1% topical cream, 1 nini, top, bid, 1 refills  venlafaxine 75 mg oral capsule, extended release, 75 mg, 1 cap(s), po, daily  Viagra 100 mg oral tablet, 50 mg, 0.5 tab(s), po, daily, 3 refills  Vitamin C  Allergies  No known allergies  Immunizations  Physical Exam  Vitals & Measurements  T:?97.2?(Tympanic)?  HR:?80?(Peripheral)?  BP:?120/70?  HT:?67?in?  WT:?167.2?lb?  BMI:?26.18?  General: no acute distress  Skin: no lesions anterior to the right ear  Left ear: lesion getting  smaller?  Lungs: clear  Heart: regular rate and rhythm  Lab Results  Diagnostic Results  Assessment/Plan  Fatigue  Patient discussed testing for Lyme at this point symptoms seem nonspecific and will hold off on Lyme testing.  Skin lesion  Seems to be resolved

## 2022-02-16 NOTE — NURSING NOTE
Comprehensive Intake Entered On:  7/19/2021 3:09 PM CDT    Performed On:  7/19/2021 2:55 PM CDT by Blanca Chowdhury CMA               Summary   Chief Complaint :   Follow up back pain has injection scheduled with Dr. Trujillo in [place], order for Dexa and colonoscopy order, skin check on back   Weight Measured :   149.5 lb(Converted to: 149 lb 8 oz, 67.812 kg)    Height Measured :   67 in(Converted to: 5 ft 7 in, 170.18 cm)    Body Mass Index :   23.41 kg/m2   Body Surface Area :   1.79 m2   Systolic Blood Pressure :   134 mmHg (HI)    Diastolic Blood Pressure :   76 mmHg   Mean Arterial Pressure :   95 mmHg   Peripheral Pulse Rate :   76 bpm   BP Site :   Right arm   Pulse Site :   Radial artery   BP Method :   Manual   HR Method :   Manual   Temperature Tympanic :   97.6 DegF(Converted to: 36.4 DegC)  (LOW)    Blanca Chowdhury CMA - 7/19/2021 2:55 PM CDT   Health Status   Allergies Verified? :   Yes   Medication History Verified? :   Yes   Immunizations Current :   No   Medical History Verified? :   No   Pre-Visit Planning Status :   Completed   Tobacco Use? :   Unknown if ever smoked   Blanca Cohwdhury CMA - 7/19/2021 2:55 PM CDT   Consents   Consent for Immunization Exchange :   Consent Granted   Consent for Immunizations to Providers :   Consent Granted   Blanca Chowdhury CMA - 7/19/2021 2:55 PM CDT   Meds / Allergies   (As Of: 7/19/2021 3:09:54 PM CDT)   Allergies (Active)   No known allergies  Estimated Onset Date:   Unspecified ; Created By:   Gena Adan LPN; Reaction Status:   Active ; Category:   Drug ; Substance:   No known allergies ; Type:   Allergy ; Updated By:   Gena Adan LPN; Reviewed Date:   7/19/2021 2:58 PM CDT        Medication List   (As Of: 7/19/2021 3:09:54 PM CDT)   Prescription/Discharge Order    acetaminophen-oxycodone  :   acetaminophen-oxycodone ; Status:   Prescribed ; Ordered As Mnemonic:   Percocet 5/325 oral tablet ; Simple Display Line:   1 tab(s), Oral, q4 hrs, PRN: for pain, 15  tab(s), 0 Refill(s) ; Ordering Provider:   Radha Boateng MD; Catalog Code:   acetaminophen-oxycodone ; Order Dt/Tm:   7/12/2021 12:56:22 PM CDT          atropine-diphenoxylate  :   atropine-diphenoxylate ; Status:   Prescribed ; Ordered As Mnemonic:   Lomotil 2.5 mg-0.025 mg oral tablet ; Simple Display Line:   1 tab(s), po, tid, 90 tab(s), 5 Refill(s) ; Ordering Provider:   Kolton Majano MD; Catalog Code:   atropine-diphenoxylate ; Order Dt/Tm:   4/12/2021 3:11:49 PM CDT          bromocriptine  :   bromocriptine ; Status:   Prescribed ; Ordered As Mnemonic:   Cycloset 0.8 mg oral tablet ; Simple Display Line:   3 tab(s), Oral, qam, DOSE INCREASE 6/24/2020., 280 EA, 0 Refill(s) ; Ordering Provider:   Misael Crow PA-C; Catalog Code:   bromocriptine ; Order Dt/Tm:   6/30/2021 12:55:36 PM CDT          dutasteride  :   dutasteride ; Status:   Prescribed ; Ordered As Mnemonic:   dutasteride 0.5 mg oral capsule ; Simple Display Line:   1 cap(s), Oral, daily, 95 EA, 0 Refill(s) ; Ordering Provider:   Misael Crow PA-C; Catalog Code:   dutasteride ; Order Dt/Tm:   6/30/2021 12:57:08 PM CDT          ipratropium nasal  :   ipratropium nasal ; Status:   Prescribed ; Ordered As Mnemonic:   Atrovent 42 mcg/inh nasal spray ; Simple Display Line:   2 spray(s), nasal, qid, PRN: for nasal congestion, 1 EA, 2 Refill(s) ; Ordering Provider:   Misael Crow PA-C; Catalog Code:   ipratropium nasal ; Order Dt/Tm:   5/1/2018 10:02:36 AM CDT          lidocaine topical  :   lidocaine topical ; Status:   Prescribed ; Ordered As Mnemonic:   lidocaine 5% topical ointment ; Simple Display Line:   1 nini, top, tid, 35 gm, 3 Refill(s) ; Ordering Provider:   Kolton Majano MD; Catalog Code:   lidocaine topical ; Order Dt/Tm:   12/19/2019 3:27:49 PM CST          metFORMIN  :   metFORMIN ; Status:   Prescribed ; Ordered As Mnemonic:   metFORMIN 500 mg oral tablet, extended release ; Simple Display Line:   4 tab(s), Oral, daily, 380  tab(s), 3 Refill(s) ; Ordering Provider:   Kolton Majano MD; Catalog Code:   metFORMIN ; Order Dt/Tm:   4/12/2021 4:04:12 PM CDT          modafinil  :   modafinil ; Status:   Prescribed ; Ordered As Mnemonic:   modafinil 200 mg oral tablet ; Simple Display Line:   200 mg, 1 tab(s), po, qam, [facility], 30 tab(s), 0 Refill(s) ; Ordering Provider:   Kolton Majano MD; Catalog Code:   modafinil ; Order Dt/Tm:   4/12/2021 3:11:11 PM CDT          sildenafil  :   sildenafil ; Status:   Prescribed ; Ordered As Mnemonic:   sildenafil 100 mg oral tablet ; Simple Display Line:   100 mg, 1 tab(s), Oral, daily, 1 hour before sexual activity, 30 tab(s), 5 Refill(s) ; Ordering Provider:   Kolton Majano MD; Catalog Code:   sildenafil ; Order Dt/Tm:   9/23/2020 10:54:21 AM CDT          triamcinolone topical  :   triamcinolone topical ; Status:   Prescribed ; Ordered As Mnemonic:   triamcinolone 0.1% topical cream ; Simple Display Line:   1 nini, TOP, bid, use for no more than 2wks at a time, 60 gm, 1 Refill(s) ; Ordering Provider:   Kolton Majano MD; Catalog Code:   triamcinolone topical ; Order Dt/Tm:   12/19/2019 3:36:42 PM CST          venlafaxine  :   venlafaxine ; Status:   Prescribed ; Ordered As Mnemonic:   venlafaxine 75 mg oral capsule, extended release ; Simple Display Line:   75 mg, 1 cap(s), PO, Daily, 95 cap(s), 1 Refill(s) ; Ordering Provider:   Kolton Majano MD; Catalog Code:   venlafaxine ; Order Dt/Tm:   6/30/2020 6:20:30 AM CDT            Home Meds    acetaminophen  :   acetaminophen ; Status:   Documented ; Ordered As Mnemonic:   acetaminophen ; Simple Display Line:   325 mg, po, q4 hrs, PRN: as needed for pain ; Catalog Code:   acetaminophen ; Order Dt/Tm:   7/1/2014 1:21:25 PM CDT          acetaminophen-oxycodone  :   acetaminophen-oxycodone ; Status:   Documented ; Ordered As Mnemonic:   acetaminophen-oxycodone 325 mg-5 mg oral tablet ; Simple Display Line:   1 tab(s), po, daily,  PRN: as needed for pain ; Catalog Code:   acetaminophen-oxycodone ; Order Dt/Tm:   9/5/2012 2:11:41 PM CDT          biotin  :   biotin ; Status:   Documented ; Ordered As Mnemonic:   biotin 300 mcg oral tablet ; Simple Display Line:   300 mcg, 1 tab(s), po, daily ; Catalog Code:   biotin ; Order Dt/Tm:   7/1/2014 1:23:25 PM CDT          ibuprofen  :   ibuprofen ; Status:   Documented ; Ordered As Mnemonic:   ibuprofen 600 mg oral tablet ; Simple Display Line:   600 mg, 1 tab(s), po, daily, PRN: as needed for pain ; Catalog Code:   ibuprofen ; Order Dt/Tm:   9/5/2012 2:09:45 PM CDT          Miscellaneous Prescription  :   Miscellaneous Prescription ; Status:   Documented ; Ordered As Mnemonic:   Vitamin C ; Simple Display Line:   See Instructions, 1000mg tablet po bid ; Catalog Code:   Miscellaneous Prescription ; Order Dt/Tm:   7/1/2014 1:22:31 PM CDT            Social History   Social History   (As Of: 7/19/2021 3:09:54 PM CDT)   Alcohol:        Current, Beer (12 oz), 1-2 times per month, 2 drinks/episode average.   (Last Updated: 6/5/2014 6:36:23 PM CDT by Elsie Sellers CMA)          Tobacco:  Past      some day chewing   (Last Updated: 4/12/2021 2:32:13 PM CDT by Blanca Chowdhury CMA)          Electronic Cigarette/Vaping:        Electronic Cigarette Use: Never.   (Last Updated: 2/10/2021 3:10:53 PM CST by Alberto Palumbo CMA)          Substance Abuse:        Never   (Last Updated: 10/9/2012 9:09:03 AM CDT by Yaa Tillman)          Employment/School:        Retired, Work/School description: Lt. Col., [employer].   (Last Updated: 10/9/2012 9:10:02 AM CDT by Yaa Tillman)          Exercise:        Exercise frequency: 1-2 times/week.  Exercise type: Walking, Weight lifting.   (Last Updated: 10/9/2012 9:08:01 AM CDT by Yaa Tillman)          Sexual:        Sexually active: No.  Sexual orientation: Heterosexual.   (Last Updated: 10/9/2012 9:06:24 AM CDT by Yaa Tillman)

## 2022-02-16 NOTE — NURSING NOTE
Comprehensive Intake Entered On:  9/3/2020 10:31 AM CDT    Performed On:  9/3/2020 10:23 AM CDT by Alberto Palumbo CMA               Summary   Chief Complaint :   Pt here for itchy and plugged ears x 4 days   Weight Measured :   160 lb(Converted to: 160 lb 0 oz, 72.57 kg)    Height Measured :   67 in(Converted to: 5 ft 7 in, 170.18 cm)    Body Mass Index :   25.06 kg/m2 (HI)    Body Surface Area :   1.85 m2   Systolic Blood Pressure :   122 mmHg   Diastolic Blood Pressure :   76 mmHg   Mean Arterial Pressure :   91 mmHg   Peripheral Pulse Rate :   72 bpm   BP Site :   Right arm   Pulse Site :   Radial artery   Temperature Tympanic :   97.4 DegF(Converted to: 36.3 DegC)  (LOW)    Respiratory Rate :   16 br/min   Alberto Palumbo CMA - 9/3/2020 10:23 AM CDT   Health Status   Allergies Verified? :   Yes   Medication History Verified? :   Yes   Immunizations Current :   No   Medical History Verified? :   Yes   Pre-Visit Planning Status :   Not completed   Tobacco Use? :   Never smoker   Alberto Palumbo CMA - 9/3/2020 10:23 AM CDT   Meds / Allergies   (As Of: 9/3/2020 10:31:14 AM CDT)   Allergies (Active)   No known allergies  Estimated Onset Date:   Unspecified ; Created By:   Gena Adan LPN; Reaction Status:   Active ; Category:   Drug ; Substance:   No known allergies ; Type:   Allergy ; Updated By:   Gena Adan LPN; Reviewed Date:   9/3/2020 10:29 AM CDT        Medication List   (As Of: 9/3/2020 10:31:14 AM CDT)   Prescription/Discharge Order    venlafaxine  :   venlafaxine ; Status:   Prescribed ; Ordered As Mnemonic:   venlafaxine 75 mg oral capsule, extended release ; Simple Display Line:   75 mg, 1 cap(s), PO, Daily, 95 cap(s), 1 Refill(s) ; Ordering Provider:   Kolton Majano MD; Catalog Code:   venlafaxine ; Order Dt/Tm:   6/30/2020 6:20:30 AM CDT          metFORMIN  :   metFORMIN ; Status:   Prescribed ; Ordered As Mnemonic:   metFORMIN 500 mg oral tablet, extended release ; Simple Display Line:   4  tab(s), Oral, daily, 380 tab(s), 3 Refill(s) ; Ordering Provider:   Kolton Majano MD; Catalog Code:   metFORMIN ; Order Dt/Tm:   6/24/2020 3:18:49 PM CDT          dutasteride  :   dutasteride ; Status:   Prescribed ; Ordered As Mnemonic:   Avodart 0.5 mg oral capsule ; Simple Display Line:   0.5 mg, 1 cap(s), PO, Daily, 95 cap(s), 3 Refill(s) ; Ordering Provider:   Kolton Majano MD; Catalog Code:   dutasteride ; Order Dt/Tm:   6/24/2020 3:10:42 PM CDT          sildenafil  :   sildenafil ; Status:   Prescribed ; Ordered As Mnemonic:   sildenafil 20 mg oral tablet ; Simple Display Line:   2.5 tab(s), Oral, daily, 30-60 MINUTES PRIOR TO SEXUAL ACTIVITY., PRN: AS NEEDED, 10 tab(s), 10 Refill(s) ; Ordering Provider:   Kolton Majano MD; Catalog Code:   sildenafil ; Order Dt/Tm:   6/24/2020 3:10:53 PM CDT          bromocriptine  :   bromocriptine ; Status:   Prescribed ; Ordered As Mnemonic:   bromocriptine 0.8 mg oral tablet ; Simple Display Line:   2.4 mg, 3 tab(s), po, qam, 280 tab(s), 3 Refill(s) ; Ordering Provider:   Kolton Majano MD; Catalog Code:   bromocriptine ; Order Dt/Tm:   6/24/2020 3:09:42 PM CDT          atropine-diphenoxylate  :   atropine-diphenoxylate ; Status:   Prescribed ; Ordered As Mnemonic:   Lomotil 2.5 mg-0.025 mg oral tablet ; Simple Display Line:   1 tab(s), po, tid, 90 tab(s), 5 Refill(s) ; Ordering Provider:   Kolotn Majano MD; Catalog Code:   atropine-diphenoxylate ; Order Dt/Tm:   6/4/2020 11:42:36 AM CDT          triamcinolone topical  :   triamcinolone topical ; Status:   Prescribed ; Ordered As Mnemonic:   triamcinolone 0.1% topical cream ; Simple Display Line:   1 nini, TOP, bid, use for no more than 2wks at a time, 60 gm, 1 Refill(s) ; Ordering Provider:   Kolton Majano MD; Catalog Code:   triamcinolone topical ; Order Dt/Tm:   12/19/2019 3:36:42 PM CST          lidocaine topical  :   lidocaine topical ; Status:   Prescribed ; Ordered As Mnemonic:   lidocaine 5% topical  ointment ; Simple Display Line:   1 nini, top, tid, 35 gm, 3 Refill(s) ; Ordering Provider:   Kolton Majano MD; Catalog Code:   lidocaine topical ; Order Dt/Tm:   12/19/2019 3:27:49 PM CST          ipratropium nasal  :   ipratropium nasal ; Status:   Prescribed ; Ordered As Mnemonic:   Atrovent 42 mcg/inh nasal spray ; Simple Display Line:   2 spray(s), nasal, qid, PRN: for nasal congestion, 1 EA, 2 Refill(s) ; Ordering Provider:   Misael Crow PA-C; Catalog Code:   ipratropium nasal ; Order Dt/Tm:   5/1/2018 10:02:36 AM CDT            Home Meds    biotin  :   biotin ; Status:   Documented ; Ordered As Mnemonic:   biotin 300 mcg oral tablet ; Simple Display Line:   300 mcg, 1 tab(s), po, daily ; Catalog Code:   biotin ; Order Dt/Tm:   7/1/2014 1:23:25 PM CDT          Miscellaneous Prescription  :   Miscellaneous Prescription ; Status:   Documented ; Ordered As Mnemonic:   Vitamin C ; Simple Display Line:   See Instructions, 1000mg tablet po bid ; Catalog Code:   Miscellaneous Prescription ; Order Dt/Tm:   7/1/2014 1:22:31 PM CDT          acetaminophen  :   acetaminophen ; Status:   Documented ; Ordered As Mnemonic:   acetaminophen ; Simple Display Line:   325 mg, po, q4 hrs, PRN: as needed for pain ; Catalog Code:   acetaminophen ; Order Dt/Tm:   7/1/2014 1:21:25 PM CDT          modafinil  :   modafinil ; Status:   Documented ; Ordered As Mnemonic:   modafinil 200 mg oral tablet ; Simple Display Line:   200 mg, 1 tab(s), po, qam, PRN ; Catalog Code:   modafinil ; Order Dt/Tm:   2/6/2014 5:07:37 PM CST          ibuprofen  :   ibuprofen ; Status:   Documented ; Ordered As Mnemonic:   ibuprofen 600 mg oral tablet ; Simple Display Line:   600 mg, 1 tab(s), po, daily, PRN: as needed for pain ; Catalog Code:   ibuprofen ; Order Dt/Tm:   9/5/2012 2:09:45 PM CDT          acetaminophen-oxycodone  :   acetaminophen-oxycodone ; Status:   Documented ; Ordered As Mnemonic:   acetaminophen-oxycodone 325 mg-5 mg oral tablet ;  Simple Display Line:   1 tab(s), po, daily, PRN: as needed for pain ; Catalog Code:   acetaminophen-oxycodone ; Order Dt/Tm:   9/5/2012 2:11:41 PM CDT            ID Risk Screen   Recent Travel History :   No recent travel   Family Member Travel History :   No recent travel   Other Exposure to Infectious Disease :   Unknown   Alberto Palumbo CMA - 9/3/2020 10:23 AM CDT   Social History   Social History   (As Of: 9/3/2020 10:31:14 AM CDT)   Alcohol:        Current, Beer (12 oz), 1-2 times per month, 2 drinks/episode average.   (Last Updated: 6/5/2014 6:36:23 PM CDT by Elsie Sellers CMA)          Tobacco:        Current, Snuff - 1/2 can per day, 25 year(s).   (Last Updated: 7/1/2014 1:00:11 PM CDT by Elsie Sellers CMA)          Substance Abuse:        Never   (Last Updated: 10/9/2012 9:09:03 AM CDT by Yaa Tillman)          Employment/School:        Retired, Work/School description: Lt. Col., [employer].   (Last Updated: 10/9/2012 9:10:02 AM CDT by Yaa Tillman)          Exercise:        Exercise frequency: 1-2 times/week.  Exercise type: Walking, Weight lifting.   (Last Updated: 10/9/2012 9:08:01 AM CDT by Yaa Tillman)          Sexual:        Sexually active: No.  Sexual orientation: Heterosexual.   (Last Updated: 10/9/2012 9:06:24 AM CDT by Yaa Tillman)

## 2022-02-16 NOTE — PROGRESS NOTES
Patient:   NOY CRUZ            MRN: [id number]            FIN: [id number]               Age:   69 years     Sex:  Male     :  1950   Associated Diagnoses:   Chronic diarrhea; History of anemia; Diabetes Mellitus; Pruritus, Perianal   Author:   Kolton Majano MD      Visit Information      Date of Service: 2020 10:26 am  Performing Location: [facility]  Encounter#: [id number]      Primary Care Provider (PCP):  Kolton Majano MD    NPI# [id number]      Referring Provider:  Kolton Majano MD    NPI# [id number]      Chief Complaint   2020 10:31 AM CDT   Follow up lab/medication        History of Present Illness   Started Bromocriptine 2017 for diabetes. No significant side effects. Increasing exercise and improving diet (reduced sweets) since finding out HgbA1c 8.4%. Eating lots of salads. Increased bromocriptine    Chewing rarely. Using nicorrette. Wants to quit and concerned that chewing could have contributed to the colon polyps. Chews copehagen 2-3 cans a week. Chewing for 30 years. Used an herbal supplement and gum. Using nicorette lozenges.    Had hemorrhoids surgically fixed . Continues to have pain and itching intermittently but less frequently. When has rectal pain 1x a week triamcinolone 0.1% cream and lidocaine helps.    Had loose stools 3-4x a day until starting cholestyramine. Now has 1-2 stools usually formed. Still taking lomotil 3 tabs a day. Loose stools started following the right hemicolectomy . Still has gallbladder.    Takes percocet about 8-10 tabs a month for various chronic pains. [facility]jia)  Will be getting a cervical injection  Uses tylenol daily for lower back pain and neck pain    Diabetes diagnosed in .  Everything hurts when walks a mile. Most pain in left achilles since injury .  Weight is stable  Takes modafinil for sleep apnea and prescribed by [facility] when travelling  Takes  effexor  Prostate 8.5 in  with normal biopsies  [place] jump with injury in neck and has had chronic issues and chronic pain  Taking Effexor since  for anxiety and depression (knew some people  in [place] [employer] on )  Taking modafinil since  giving sleepiness from sleep apnea         Review of Systems   Respiratory:  No shortness of breath.    Cardiovascular:  No chest pain.    Gastrointestinal:  No vomiting.    PHQ-9: 2pts (2019). 7pts (2017). 2pts (2016)  Eczema in the winter  CAGE: 0pts and minimal alcohol  No longer having black stools      Health Status   Allergies:    Allergic Reactions (Selected)  No known allergies   Medications:  (Selected)   Prescriptions  Prescribed  Atrovent 42 mcg/inh nasal spray: 2 spray(s), nasal, qid, PRN: for nasal congestion, # 1 EA, 2 Refill(s), Type: Maintenance, Pharmacy: [facility], 2 spray(s) nasal qid,PRN:for nasal congestion  Avodart 0.5 mg oral capsule: = 1 cap(s) ( 0.5 mg ), PO, Daily, # 95 cap(s), 3 Refill(s), Type: Maintenance, Pharmacy: [facility], 1 cap(s) Oral daily, 67, in, 20 14:54:00 CDT, Height Measured, 161, lb, 20 14:54:00 CDT, Weight Measured  Lomotil 2.5 mg-0.025 mg oral tablet: 1 tab(s), po, tid, # 90 tab(s), 5 Refill(s), Type: Maintenance, Pharmacy: [facility], 1 tab(s) Oral tid, 67, in, 19 15:02:00 CST, Height Measured, 162, lb, 19 15:02:00 CST, Weight Measured  bromocriptine 0.8 mg oral tablet: = 3 tab(s) ( 2.4 mg ), po, qam, # 280 tab(s), 3 Refill(s), Type: Maintenance, Pharmacy: [facility], 3 tab(s) Oral qam, 67, in, 20 14:54:00 CDT, Height Measured, 161, lb, 20 14:54:00 CDT, Weight Measured  lidocaine 5% topical ointment: 1 nini, top, tid, # 35 gm, 3 Refill(s), Type: Maintenance, Pharmacy: [facility], 1 nini Topical tid  metFORMIN 500 mg oral tablet, extended release: = 4 tab(s), Oral, daily, # 380 tab(s), 3 Refill(s), SHADE, Type: Maintenance, [facility]acy:  [facility] has appt on 2/21 will give more refills at that time, 4 tab(s) Oral daily, 67, in, 06/24/20 14:54:00 CDT, Height Measured, 161, lb, 06/24/20 14:54:0...  sildenafil 20 mg oral tablet: = 2.5 tab(s), Oral, daily, Instructions: 30-60 MINUTES PRIOR TO SEXUAL ACTIVITY., PRN: AS NEEDED, # 10 tab(s), 10 Refill(s), SHADE, Type: Maintenance, Pharmacy: [facility], Has appt 2/21 will give more refills at that time, 2.5 tab(s) Oral daily,PRN:A...  triamcinolone 0.1% topical cream: 1 nini, TOP, bid, Instructions: use for no more than 2wks at a time, # 60 gm, 1 Refill(s), Type: Maintenance, Pharmacy: [facility], 1 nini Topical bid,Instr:use for no more than 2wks at a time  venlafaxine 75 mg oral capsule, extended release: = 1 cap(s) ( 75 mg ), PO, Daily, # 95 cap(s), 1 Refill(s), Type: Maintenance, Pharmacy: [facility], 1 cap(s) Oral daily, 67, in, 06/24/20 14:54:00 CDT, Height Measured, 161, lb, 06/24/20 14:54:00 CDT, Weight Measured  Documented Medications  Documented  Vitamin C: Vitamin C, See Instructions, Instructions: 1000mg tablet po bid, Supply, 0 Refill(s), Type: Maintenance  acetaminophen-oxycodone 325 mg-5 mg oral tablet: 1 tab(s), po, daily, PRN: as needed for pain, 0 Refill(s), Type: Maintenance  acetaminophen: ( 325 mg ), po, q4 hrs, PRN: as needed for pain, 0 Refill(s), Type: Maintenance  biotin 300 mcg oral tablet: 1 tab(s) ( 300 mcg ), po, daily, 0 Refill(s), Type: Maintenance  ibuprofen 600 mg oral tablet: 1 tab(s) ( 600 mg ), po, daily, PRN: as needed for pain, 0 Refill(s), Type: Maintenance  modafinil 200 mg oral tablet: 1 tab(s) ( 200 mg ), po, qam, Instructions: PRN, 0 Refill(s), Type: Maintenance   Problem list:    All Problems  Colon Polyps / SNOMED CT 145993877 / Confirmed  BPH (benign prostatic hypertrophy) / SNOMED CT 339013464 / Confirmed  Diabetes mellitus / SNOMED CT 022463243 / Confirmed  Refusal of statin medication by patient / SNOMED CT 501367611 / Confirmed  Resolved:  *[email or web address] - Sessile serrated adenomas  Resolved: Tobacco user / ICD-9-.1      Histories   Procedure history:    Cardiac computed tomography for calcium scoring (SNOMED CT 3113215411) on 2018 at 67 Years.  Comments:  2018 9:26 AM CDT - Kailee Woodward  Total Agatston calcium score is 393  Colonoscopy (SNOMED CT 393352689) performed by Kolton Majano MD on 2017 at 67 Years.  Comments:  2017 3:20 PM CST - Larissa Romano  Sedation:  Fentanyl and Versed  Negative for microscopic colitis  F/u in 5 years  Right hemicolectomy (SNOMED CT 644348986) performed by Nitesh Whitehead MD on 2014 at 63 Years.  Colonoscopy (SNOMED CT 531874357) performed by Shira Christian MD on 2009 at 58 Years.  Biopsy of prostate x 3 (SNOMED CT 902963819) in  at 58 Years.  Repair of tendon of upper limb (SNOMED CT 715162797) on 2002 at 51 Years.  Extraction of wisdom tooth (SNOMED CT 454688463) in  at 21 Years.     Family History: No colon cancer or polyps on Dad side but mom side unknown, Mom  age 93 from stroke/CHF. Dad  heart attack age 65 and had CABG in 50's. Brother is healthy  Social History: Retired [employer]  (33 years). Currently chewing 2-3 cans a week down from 4-5. Single (never ) and no children. Last long term relationship  of Leukemia      Physical Examination   Vital Signs   2020 10:31 AM CDT Temperature Tympanic 97.5 DegF  LOW    Peripheral Pulse Rate 70 bpm    Pulse Site Radial artery    HR Method Manual    Systolic Blood Pressure 124 mmHg    Diastolic Blood Pressure 70 mmHg    Mean Arterial Pressure 88 mmHg    BP Site Right arm    BP Method Manual      Measurements from flowsheet : Measurements   2020 10:31 AM CDT Height Measured - Standard 67 in    Weight Measured - Standard 162 lb    BSA 1.86 m2    Body Mass Index 25.37 kg/m2  HI      General:  Alert and oriented, No acute distress.    Respiratory:  Lungs are clear to auscultation.     Cardiovascular:  Normal rate, Regular rhythm, No edema.    Musculoskeletal:  Normal gait.    Neurologic: normal monofilament testing (September 2020)  Psychiatric: word recall immediate 3/3 and delayed 3/3 (December 2019)  Extremities: No leg swelling  Skin: 5mm brown colored lesion right thigh      Review / Management   Cardiac Calcium score (July 2018): 393pts with 77th percentile      Results review:  Lab results: 9/21/2020 8:50 AM CDT    Hgb A1c                   6.6  HI  .       Impression and Plan   Diagnosis     Chronic diarrhea (OIH16-OT K52.9).     History of anemia (UIG26-TP Z86.2).     Diabetes Mellitus (OGJ05-MP E11.9).     Pruritus, Perianal (EZP26-KS L29.0).       Diabetes: continue bromocriptine 2.4mg daily with HgbA1c 6.6% (June 2020). Continue metformin 2000mg daily. Discussed and declines statin given diabetes even with low LDL Taking aspirin.    Anal pruritis: lidocaine has worked well. Seldom needs anymore  Diarrhea: continue lomotil 2-3x daily (December 2019)  Anemia: has corrected with normal iron stores. Will continue to monitor CBC  Colon cancer screening: had right hemicolectomy for large polyps 2014. Normal colonoscopy 2009. Two colonoscopy prior to 2009. Normal colonoscopy November 2017 and repeat in 2022  Asbestosis exposure: [place] in the . CXR 2017 and 2020 negative and repeat 2025  Immun[facility]  Eczema in the winter and triamcinolone helps  Skin lesion: monitor  PSA 0.70-1.42 since 2012  Refilled viagra (declines STD testing), lomotil, metformin, bromocriptine February 2019  Renewed medications June 2020    Reviewed [place] [facility] September 2020

## 2022-02-16 NOTE — PROGRESS NOTES
Patient:   NOY CRUZ            MRN: [id number]            FIN: [id number]               Age:   66 years     Sex:  Male     :  1950   Associated Diagnoses:   Chronic diarrhea; History of anemia; Diabetes Mellitus; Pruritus, Perianal   Author:   Kolton Majano MD      Visit Information      Date of Service: 2017 04:03 pm  Performing Location: [facility]  Encounter#: [id number]      Primary Care Provider (PCP):  Kolton Majano MD    NPI# [id number]      Referring Provider:  Kolton Majano MD    NPI# [id number]      Chief Complaint   2017 4:05 PM CDT    Diabetic check      History of Present Illness   Tried quitting chew on own. Tried nicorrette. Wants to quit and concerned that chewing could have contributed to the colon polyps. Chews copehagen three cans a week. Chewing for 30 years. Used an herbal supplement and gum. Using nicorette lozenges.    Had hemorrhoids surgically fixed . Continues to have pain and itching intermittently but less frequently. When has rectal pain 1x a week triamcinolone 0.1% cream and lidocaine helps.  Takes Metformin ER 500mg in AM and 1000mg PM. Discussed statin even though cholesterol well controlled given recent guidelines.    Had loose stools 3-4x a day until starting cholestyramine. Now has 1-2 stools and formed. Still taking lomotil 6 tabs a day. Loose stools started following the right hemicolectomy . Still has gallbladder.    Takes percocet about 4-6 tabs a month for various chronic pains. [facility] (Dr Mejia)  Uses ibuprofen 3-4x a week.    Diabetes diagnosed in .  Everything hurts when walks a mile. Most pain in left achilles since injury .  Has lost 10 lbs.  Takes modafinil for sleep apnea and [facility] by [facility]  Takes effexor  Prostate 8.5 in  with normal biopsies      Review of Systems   Constitutional:  No fever.    Respiratory:  No shortness of breath.    Cardiovascular:  No chest pain.     Gastrointestinal:  No vomiting.    PHQ-9: 7pts (September 2017). 2pts (February 2016)  Eczema in the winter  CAGE: 0pts and minimal alcohol  Has some occasional black stools and not taking iron      Health Status   Allergies:    Allergic Reactions (Selected)  No known allergies   Medications:  (Selected)   Prescriptions  Prescribed  Avodart 0.5 mg oral capsule: 1 cap(s) ( 0.5 mg ), PO, Daily, # 30 cap(s), 1 Refill(s), Type: Maintenance, Pharmacy: [facility], 1 cap(s) po daily  Lomotil 2.5 mg-0.025 mg oral tablet: 1-2 tab(s), po, qid, # 120 tab(s), 5 Refill(s), Type: Maintenance, Pharmacy: [facility], 1-2 tab(s) po qid,x30 day(s)  Prevalite 4 g/5.5 g oral powder for reconstitution: ( 4 gm ), po, daily, # 90 EA, 3 Refill(s), Type: Maintenance, Pharmacy: [facility], 4 gm po daily  Viagra 100 mg oral tablet: 0.5 tab(s) ( 50 mg ), po, daily, Instructions: take when needed., # 10 tab(s), 3 Refill(s), Type: Maintenance, Pharmacy: [facility], 0.5 tab(s) po daily,Instr:take when needed.  lidocaine 5% topical ointment: 1 nini, top, tid, # 35 gm, 3 Refill(s), Type: Maintenance, Pharmacy: [facility], 1 nini top tid  metFORMIN 500 mg oral tablet, extended release: 3 tab(s) ( 1,500 mg ), po, daily, # 280 tab(s), 3 Refill(s), Type: Maintenance, Pharmacy: [facility], Pt is overdue for DM check and labs--must be seen for further refills., 3 tab(s) po daily  triamcinolone 0.1% topical cream: 1 nini, TOP, bid, Instructions: use for no more than 2wks at a time, # 60 gm, 1 Refill(s), Type: Maintenance, Pharmacy: [facility], 1 nini top bid,Instr:use for no more than 2wks at a time  Documented Medications  Documented  Vitamin C: Vitamin C, See Instructions, Instructions: 1000mg tablet po bid, Supply, 0 Refill(s), Type: Maintenance  acetaminophen-oxycodone 325 mg-5 mg oral tablet: 1 tab(s), po, daily, PRN: as needed for pain, 0  Refill(s), Type: Maintenance  acetaminophen: ( 325 mg ), po, q4 hrs, PRN: as needed for pain, 0 Refill(s), Type: Maintenance  biotin 300 mcg oral tablet: 1 tab(s) ( 300 mcg ), po, daily, 0 Refill(s), Type: Maintenance  ibuprofen 600 mg oral tablet: 1 tab(s) ( 600 mg ), po, daily, PRN: as needed for pain, 0 Refill(s), Type: Maintenance  modafinil 200 mg oral tablet: 1 tab(s) ( 200 mg ), po, qam, Instructions: PRN, 0 Refill(s), Type: Maintenance  venlafaxine 75 mg oral capsule, extended release: 1 cap(s) ( 75 mg ), PO, Daily, # 30 cap(s), 0 Refill(s), Type: Maintenance   Problem list:    All Problems  Colon Polyps / SNOMED CT 350534458 / Confirmed  BPH / ICD-9-.00 / Confirmed  Diabetes Mellitus / ICD-9-.00 / Confirmed  Resolved: *[email or web address] - Sessile serrated adenomas  Resolved: Tobacco user / ICD-9-.1      Histories   Family History: No colon cancer or polyps on Dad side but mom side unknown, Mom  age 93 from stroke/CHF; Dad  heart attack age 65 and had CABG in 50's; Brother is healthy   Procedure history:    Right hemicolectomy (SNOMED CT 779865946) performed by Nitesh Whitehead MD on 2014 at 63 Years.  Colonoscopy (SNOMED CT 347346110) performed by Shira Christian MD on 2009 at 58 Years.  Biopsy of prostate x 3 (SNOMED CT 971523624) in  at 58 Years.  Repair of tendon of upper limb (SNOMED CT 321120047) on 2002 at 51 Years.  Extraction of wisdom tooth (SNOMED CT 217025168) in  at 21 Years.   Social History: Retired [employer]. Currently chewing 3 cans a week down from 4-5      Physical Examination   Vital Signs   2017 4:05 PM CDT Temperature Tympanic 97.1 DegF  LOW    Peripheral Pulse Rate 76 bpm    Systolic Blood Pressure 124 mmHg    Diastolic Blood Pressure 66 mmHg      General:  Alert and oriented, No acute distress.    Neck:  No lymphadenopathy.    Respiratory:  Lungs are clear to auscultation.    Cardiovascular:  Normal rate, Regular rhythm, No edema.     Gastrointestinal:  Soft, Non-tender, Non-distended.    Musculoskeletal:  Normal gait.    Neurologic:  No focal deficits, normal monofilament testing.       Impression and Plan   Diagnosis     Chronic diarrhea (FDB24-RT K52.9).     History of anemia (JHD05-XP Z86.2).     Diabetes Mellitus (UYJ67-JF E11.9).     Pruritus, Perianal (UXM49-UE L29.0).       Diabetes: continue metformin and increase to 2000mg daily. HgbA1c 7.4% (August 2017). Discussed statin given diabetes even with low LDL (declines for now). Trying nicotine gum. Restarted aspirin  Anal pruritis: lidocaine has worked well and refilled. Uses 1-2x a week  Diarrhea: continue cholestyramine once daily and lomotil 6x daily  Anemia: has corrected with normal iron stores. He stopped daily aspirin as may have been some gastritis. Will monitor CBC  Colon cancer screening: had right hemicolectomy for large polyps. Recommend repeat June 2017 and schedule  Asbestosis exposure: [place] in the . CXR 2017 negative and repeat 2020  Eczema in the winter and triamcinolone helps  Decreased energy: hemoglobin and TSH have been normal. Possible chronic fatigue syndrome (started in 2007)  Smoking: starting to use the nicotene lozenges  Monitor weight    PSA 0.70-1.42 since 2012    Reviewed [place] [facility] (September 2017)

## 2022-02-16 NOTE — NURSING NOTE
Depression Screening Entered On:  12/19/2019 6:15 PM CST    Performed On:  12/19/2019 6:15 PM CST by Qian ESPINAL, Ina               Depression Screening   Little Interest - Pleasure in Activities :   Not at all   Feeling Down, Depressed, Hopeless :   Not at all   Initial Depression Screen Score :   0    Trouble Falling or Staying Asleep :   Several days   Feeling Tired or Little Energy :   Several days   Poor Appetite or Overeating :   Not at all   Feeling Bad About Yourself :   Not at all   Trouble Concentrating :   Not at all   Moving or Speaking Slowly :   Not at all   Thoughts Better Off Dead or Hurting Self :   Not at all   Detailed Depression Screen Score :   2    Total Depression Screen Score :   2    JENNIFER Difficulty with Work, Home, Others :   Not difficult at all   Qian ESPINAL, Ina - 12/19/2019 6:15 PM CST

## 2022-02-16 NOTE — NURSING NOTE
Comprehensive Intake Entered On:  4/12/2021 2:35 PM CDT    Performed On:  4/12/2021 2:26 PM CDT by Blanca Chowdhury CMA               Summary   Chief Complaint :   Medication check    Weight Measured :   163.7 lb(Converted to: 163 lb 11 oz, 74.253 kg)    Height Measured :   67 in(Converted to: 5 ft 7 in, 170.18 cm)    Body Mass Index :   25.64 kg/m2 (HI)    Body Surface Area :   1.87 m2   Systolic Blood Pressure :   122 mmHg   Diastolic Blood Pressure :   74 mmHg   Mean Arterial Pressure :   90 mmHg   Peripheral Pulse Rate :   76 bpm   BP Site :   Right arm   Pulse Site :   Radial artery   BP Method :   Manual   HR Method :   Manual   Temperature Tympanic :   97.3 DegF(Converted to: 36.3 DegC)  (LOW)    Oxygen Saturation :   99 %   Blanca Chowdhury CMA - 4/12/2021 2:26 PM CDT   Health Status   Allergies Verified? :   Yes   Medication History Verified? :   Yes   Immunizations Current :   No   Medical History Verified? :   No   Pre-Visit Planning Status :   Completed   Tobacco Use? :   Never smoker   Blanca Chowdhury CMA - 4/12/2021 2:26 PM CDT   Consents   Consent for Immunization Exchange :   Consent Granted   Consent for Immunizations to Providers :   Consent Granted   Blanca Chowdhury CMA - 4/12/2021 2:26 PM CDT   Meds / Allergies   (As Of: 4/12/2021 2:35:38 PM CDT)   Allergies (Active)   No known allergies  Estimated Onset Date:   Unspecified ; Created By:   Gena Adan LPN; Reaction Status:   Active ; Category:   Drug ; Substance:   No known allergies ; Type:   Allergy ; Updated By:   Gena Adan LPN; Reviewed Date:   4/12/2021 2:29 PM CDT        Medication List   (As Of: 4/12/2021 2:35:38 PM CDT)   Prescription/Discharge Order    atropine-diphenoxylate  :   atropine-diphenoxylate ; Status:   Prescribed ; Ordered As Mnemonic:   Lomotil 2.5 mg-0.025 mg oral tablet ; Simple Display Line:   1 tab(s), po, tid, 90 tab(s), 5 Refill(s) ; Ordering Provider:   Kolton Majano MD; Catalog Code:   atropine-diphenoxylate ;  Order Dt/Tm:   12/9/2020 3:05:34 PM CST          bromocriptine  :   bromocriptine ; Status:   Prescribed ; Ordered As Mnemonic:   bromocriptine 0.8 mg oral tablet ; Simple Display Line:   2.4 mg, 3 tab(s), po, qam, 280 tab(s), 3 Refill(s) ; Ordering Provider:   Kolton Majano MD; Catalog Code:   bromocriptine ; Order Dt/Tm:   6/24/2020 3:09:42 PM CDT          doxycycline  :   doxycycline ; Status:   Prescribed ; Ordered As Mnemonic:   doxycycline hyclate 100 mg oral tablet ; Simple Display Line:   200 mg, 2 tab(s), po, daily, for 1 day(s), 2 tab(s), 1 Refill(s) ; Ordering Provider:   Kolton Majano MD; Catalog Code:   doxycycline ; Order Dt/Tm:   4/6/2021 10:30:10 AM CDT          dutasteride  :   dutasteride ; Status:   Prescribed ; Ordered As Mnemonic:   Avodart 0.5 mg oral capsule ; Simple Display Line:   0.5 mg, 1 cap(s), PO, Daily, 95 cap(s), 3 Refill(s) ; Ordering Provider:   Kolton Majano MD; Catalog Code:   dutasteride ; Order Dt/Tm:   6/24/2020 3:10:42 PM CDT          ipratropium nasal  :   ipratropium nasal ; Status:   Prescribed ; Ordered As Mnemonic:   Atrovent 42 mcg/inh nasal spray ; Simple Display Line:   2 spray(s), nasal, qid, PRN: for nasal congestion, 1 EA, 2 Refill(s) ; Ordering Provider:   Misael Crow PA-C; Catalog Code:   ipratropium nasal ; Order Dt/Tm:   5/1/2018 10:02:36 AM CDT          lidocaine topical  :   lidocaine topical ; Status:   Prescribed ; Ordered As Mnemonic:   lidocaine 5% topical ointment ; Simple Display Line:   1 nini, top, tid, 35 gm, 3 Refill(s) ; Ordering Provider:   Kolton Majano MD; Catalog Code:   lidocaine topical ; Order Dt/Tm:   12/19/2019 3:27:49 PM CST          metFORMIN  :   metFORMIN ; Status:   Prescribed ; Ordered As Mnemonic:   metFORMIN 500 mg oral tablet, extended release ; Simple Display Line:   4 tab(s), Oral, daily, 380 tab(s), 3 Refill(s) ; Ordering Provider:   Kolton Majano MD; Catalog Code:   metFORMIN ; Order Dt/Tm:   6/24/2020 3:18:49 PM  CDT          modafinil  :   modafinil ; Status:   Prescribed ; Ordered As Mnemonic:   modafinil 200 mg oral tablet ; Simple Display Line:   200 mg, 1 tab(s), po, qam, [facility], 30 tab(s), 0 Refill(s) ; Ordering Provider:   Kolton Majano MD; Catalog Code:   modafinil ; Order Dt/Tm:   12/28/2020 8:36:19 AM CST          sildenafil  :   sildenafil ; Status:   Prescribed ; Ordered As Mnemonic:   sildenafil 100 mg oral tablet ; Simple Display Line:   100 mg, 1 tab(s), Oral, daily, 1 hour before sexual activity, 30 tab(s), 5 Refill(s) ; Ordering Provider:   Kolton Majano MD; Catalog Code:   sildenafil ; Order Dt/Tm:   9/23/2020 10:54:21 AM CDT          triamcinolone topical  :   triamcinolone topical ; Status:   Prescribed ; Ordered As Mnemonic:   triamcinolone 0.1% topical cream ; Simple Display Line:   1 nini, TOP, bid, use for no more than 2wks at a time, 60 gm, 1 Refill(s) ; Ordering Provider:   Kolton Majano MD; Catalog Code:   triamcinolone topical ; Order Dt/Tm:   12/19/2019 3:36:42 PM CST          venlafaxine  :   venlafaxine ; Status:   Prescribed ; Ordered As Mnemonic:   venlafaxine 75 mg oral capsule, extended release ; Simple Display Line:   75 mg, 1 cap(s), PO, Daily, 95 cap(s), 1 Refill(s) ; Ordering Provider:   Kolton Majano MD; Catalog Code:   venlafaxine ; Order Dt/Tm:   6/30/2020 6:20:30 AM CDT            Home Meds    acetaminophen  :   acetaminophen ; Status:   Documented ; Ordered As Mnemonic:   acetaminophen ; Simple Display Line:   325 mg, po, q4 hrs, PRN: as needed for pain ; Catalog Code:   acetaminophen ; Order Dt/Tm:   7/1/2014 1:21:25 PM CDT          acetaminophen-oxycodone  :   acetaminophen-oxycodone ; Status:   Documented ; Ordered As Mnemonic:   acetaminophen-oxycodone 325 mg-5 mg oral tablet ; Simple Display Line:   1 tab(s), po, daily, PRN: as needed for pain ; Catalog Code:   acetaminophen-oxycodone ; Order Dt/Tm:   9/5/2012 2:11:41 PM CDT          biotin  :    biotin ; Status:   Documented ; Ordered As Mnemonic:   biotin 300 mcg oral tablet ; Simple Display Line:   300 mcg, 1 tab(s), po, daily ; Catalog Code:   biotin ; Order Dt/Tm:   7/1/2014 1:23:25 PM CDT          ibuprofen  :   ibuprofen ; Status:   Documented ; Ordered As Mnemonic:   ibuprofen 600 mg oral tablet ; Simple Display Line:   600 mg, 1 tab(s), po, daily, PRN: as needed for pain ; Catalog Code:   ibuprofen ; Order Dt/Tm:   9/5/2012 2:09:45 PM CDT          Miscellaneous Prescription  :   Miscellaneous Prescription ; Status:   Documented ; Ordered As Mnemonic:   Vitamin C ; Simple Display Line:   See Instructions, 1000mg tablet po bid ; Catalog Code:   Miscellaneous Prescription ; Order Dt/Tm:   7/1/2014 1:22:31 PM CDT            ID Risk Screen   Recent Travel History :   No recent travel   Family Member Travel History :   No recent travel   Other Exposure to Infectious Disease :   Unknown   COVID-19 Testing Status :   Positive COVID-19 test greater than 30 days ago   Blanca Chowdhury CMA - 4/12/2021 2:26 PM CDT   Social History   Social History   (As Of: 4/12/2021 2:35:38 PM CDT)   Alcohol:        Current, Beer (12 oz), 1-2 times per month, 2 drinks/episode average.   (Last Updated: 6/5/2014 6:36:23 PM CDT by Elsie Sellers CMA)          Tobacco:  Past      some day chewing   (Last Updated: 4/12/2021 2:32:13 PM CDT by Blanca Chowdhury CMA)          Electronic Cigarette/Vaping:        Electronic Cigarette Use: Never.   (Last Updated: 2/10/2021 3:10:53 PM CST by Alberto Palumbo CMA)          Substance Abuse:        Never   (Last Updated: 10/9/2012 9:09:03 AM CDT by Yaa Tillman)          Employment/School:        Retired, Work/School description: Lt. Col., [employer].   (Last Updated: 10/9/2012 9:10:02 AM CDT by Yaa Tillman)          Exercise:        Exercise frequency: 1-2 times/week.  Exercise type: Walking, Weight lifting.   (Last Updated: 10/9/2012 9:08:01 AM CDT by Yaa Tillman)          Sexual:         Sexually active: No.  Sexual orientation: Heterosexual.   (Last Updated: 10/9/2012 9:06:24 AM CDT by Yaa Tillman)

## 2022-02-16 NOTE — NURSING NOTE
Comprehensive Intake Entered On:  6/9/2021 9:37 AM CDT    Performed On:  6/9/2021 9:35 AM CDT by Teresa Reddy CMA               Summary   Chief Complaint :   c/o spot of blood in shorts in the AM x 2 weeks ago, blood in sperm two times now   Weight Measured :   152.1 lb(Converted to: 152 lb 2 oz, 68.991 kg)    Height Measured :   67 in(Converted to: 5 ft 7 in, 170.18 cm)    Body Mass Index :   23.82 kg/m2   Body Surface Area :   1.8 m2   Systolic Blood Pressure :   134 mmHg (HI)    Diastolic Blood Pressure :   69 mmHg   Mean Arterial Pressure :   91 mmHg   Peripheral Pulse Rate :   78 bpm   BP Site :   Right arm   BP Method :   Electronic   HR Method :   Electronic   Teresa Reddy CMA - 6/9/2021 9:35 AM CDT   Health Status   Allergies Verified? :   Yes   Medication History Verified? :   Yes   Immunizations Current :   No   Medical History Verified? :   Yes   Teresa Reddy CMA - 6/9/2021 9:35 AM CDT   Consents   Consent for Immunization Exchange :   Consent Granted   Consent for Immunizations to Providers :   Consent Granted   Teresa Reddy CMA - 6/9/2021 9:35 AM CDT   Meds / Allergies   (As Of: 6/9/2021 9:37:27 AM CDT)   Allergies (Active)   No known allergies  Estimated Onset Date:   Unspecified ; Created By:   Gena Adan LPN; Reaction Status:   Active ; Category:   Drug ; Substance:   No known allergies ; Type:   Allergy ; Updated By:   Gena Adan LPN; Reviewed Date:   6/9/2021 9:36 AM CDT        Medication List   (As Of: 6/9/2021 9:37:27 AM CDT)   Prescription/Discharge Order    ipratropium nasal  :   ipratropium nasal ; Status:   Prescribed ; Ordered As Mnemonic:   Atrovent 42 mcg/inh nasal spray ; Simple Display Line:   2 spray(s), nasal, qid, PRN: for nasal congestion, 1 EA, 2 Refill(s) ; Ordering Provider:   Misael Crow PA-C; Catalog Code:   ipratropium nasal ; Order Dt/Tm:   5/1/2018 10:02:36 AM CDT          atropine-diphenoxylate  :   atropine-diphenoxylate ; Status:   Prescribed ; Ordered As  Mnemonic:   Lomotil 2.5 mg-0.025 mg oral tablet ; Simple Display Line:   1 tab(s), po, tid, 90 tab(s), 5 Refill(s) ; Ordering Provider:   Kolton Majano MD; Catalog Code:   atropine-diphenoxylate ; Order Dt/Tm:   4/12/2021 3:11:49 PM CDT          acetaminophen-oxycodone  :   acetaminophen-oxycodone ; Status:   Prescribed ; Ordered As Mnemonic:   Percocet 5/325 oral tablet ; Simple Display Line:   1 tab(s), Oral, q4 hrs, PRN: for pain, 15 tab(s), 0 Refill(s) ; Ordering Provider:   Kolton Majano MD; Catalog Code:   acetaminophen-oxycodone ; Order Dt/Tm:   4/12/2021 3:10:28 PM CDT          lidocaine topical  :   lidocaine topical ; Status:   Prescribed ; Ordered As Mnemonic:   lidocaine 5% topical ointment ; Simple Display Line:   1 nini, top, tid, 35 gm, 3 Refill(s) ; Ordering Provider:   Kolton Majano MD; Catalog Code:   lidocaine topical ; Order Dt/Tm:   12/19/2019 3:27:49 PM CST          triamcinolone topical  :   triamcinolone topical ; Status:   Prescribed ; Ordered As Mnemonic:   triamcinolone 0.1% topical cream ; Simple Display Line:   1 nini, TOP, bid, use for no more than 2wks at a time, 60 gm, 1 Refill(s) ; Ordering Provider:   Kolton Majano MD; Catalog Code:   triamcinolone topical ; Order Dt/Tm:   12/19/2019 3:36:42 PM CST          metFORMIN  :   metFORMIN ; Status:   Prescribed ; Ordered As Mnemonic:   metFORMIN 500 mg oral tablet, extended release ; Simple Display Line:   4 tab(s), Oral, daily, 380 tab(s), 3 Refill(s) ; Ordering Provider:   Kolton Majano MD; Catalog Code:   metFORMIN ; Order Dt/Tm:   4/12/2021 4:04:12 PM CDT          bromocriptine  :   bromocriptine ; Status:   Prescribed ; Ordered As Mnemonic:   bromocriptine 0.8 mg oral tablet ; Simple Display Line:   2.4 mg, 3 tab(s), po, qam, 280 tab(s), 3 Refill(s) ; Ordering Provider:   Kolton Majano MD; Catalog Code:   bromocriptine ; Order Dt/Tm:   6/24/2020 3:09:42 PM CDT          modafinil  :   modafinil ; Status:   Prescribed ;  Ordered As Mnemonic:   modafinil 200 mg oral tablet ; Simple Display Line:   200 mg, 1 tab(s), po, qam, [facility], 30 tab(s), 0 Refill(s) ; Ordering Provider:   Kolton Majano MD; Catalog Code:   modafinil ; Order Dt/Tm:   4/12/2021 3:11:11 PM CDT          sildenafil  :   sildenafil ; Status:   Prescribed ; Ordered As Mnemonic:   sildenafil 100 mg oral tablet ; Simple Display Line:   100 mg, 1 tab(s), Oral, daily, 1 hour before sexual activity, 30 tab(s), 5 Refill(s) ; Ordering Provider:   Kolton Majano MD; Catalog Code:   sildenafil ; Order Dt/Tm:   9/23/2020 10:54:21 AM CDT          venlafaxine  :   venlafaxine ; Status:   Prescribed ; Ordered As Mnemonic:   venlafaxine 75 mg oral capsule, extended release ; Simple Display Line:   75 mg, 1 cap(s), PO, Daily, 95 cap(s), 1 Refill(s) ; Ordering Provider:   Kolton Majano MD; Catalog Code:   venlafaxine ; Order Dt/Tm:   6/30/2020 6:20:30 AM CDT          dutasteride  :   dutasteride ; Status:   Prescribed ; Ordered As Mnemonic:   Avodart 0.5 mg oral capsule ; Simple Display Line:   0.5 mg, 1 cap(s), PO, Daily, 95 cap(s), 3 Refill(s) ; Ordering Provider:   Kolton Majano MD; Catalog Code:   dutasteride ; Order Dt/Tm:   6/24/2020 3:10:42 PM CDT            Home Meds    Miscellaneous Prescription  :   Miscellaneous Prescription ; Status:   Documented ; Ordered As Mnemonic:   Vitamin C ; Simple Display Line:   See Instructions, 1000mg tablet po bid ; Catalog Code:   Miscellaneous Prescription ; Order Dt/Tm:   7/1/2014 1:22:31 PM CDT          acetaminophen-oxycodone  :   acetaminophen-oxycodone ; Status:   Documented ; Ordered As Mnemonic:   acetaminophen-oxycodone 325 mg-5 mg oral tablet ; Simple Display Line:   1 tab(s), po, daily, PRN: as needed for pain ; Catalog Code:   acetaminophen-oxycodone ; Order Dt/Tm:   9/5/2012 2:11:41 PM CDT          ibuprofen  :   ibuprofen ; Status:   Documented ; Ordered As Mnemonic:   ibuprofen 600 mg oral tablet  ; Simple Display Line:   600 mg, 1 tab(s), po, daily, PRN: as needed for pain ; Catalog Code:   ibuprofen ; Order Dt/Tm:   9/5/2012 2:09:45 PM CDT          biotin  :   biotin ; Status:   Documented ; Ordered As Mnemonic:   biotin 300 mcg oral tablet ; Simple Display Line:   300 mcg, 1 tab(s), po, daily ; Catalog Code:   biotin ; Order Dt/Tm:   7/1/2014 1:23:25 PM CDT          acetaminophen  :   acetaminophen ; Status:   Documented ; Ordered As Mnemonic:   acetaminophen ; Simple Display Line:   325 mg, po, q4 hrs, PRN: as needed for pain ; Catalog Code:   acetaminophen ; Order Dt/Tm:   7/1/2014 1:21:25 PM CDT            ID Risk Screen   Recent Travel History :   No recent travel   Family Member Travel History :   No recent travel   Other Exposure to Infectious Disease :   Unknown   COVID-19 Testing Status :   No COVID-19 test performed   Teresa Reddy CMA - 6/9/2021 9:35 AM CDT   Social History   Social History   (As Of: 6/9/2021 9:37:27 AM CDT)   Alcohol:        Current, Beer (12 oz), 1-2 times per month, 2 drinks/episode average.   (Last Updated: 6/5/2014 6:36:23 PM CDT by Elsie Sellers CMA)          Tobacco:  Past      some day chewing   (Last Updated: 4/12/2021 2:32:13 PM CDT by Blanca Chowdhury CMA)          Electronic Cigarette/Vaping:        Electronic Cigarette Use: Never.   (Last Updated: 2/10/2021 3:10:53 PM CST by Alberto Palumbo CMA)          Substance Abuse:        Never   (Last Updated: 10/9/2012 9:09:03 AM CDT by Yaa Tillman)          Employment/School:        Retired, Work/School description: Lt. Col., [employer].   (Last Updated: 10/9/2012 9:10:02 AM CDT by Yaa Tillman)          Exercise:        Exercise frequency: 1-2 times/week.  Exercise type: Walking, Weight lifting.   (Last Updated: 10/9/2012 9:08:01 AM CDT by Yaa Tillman)          Sexual:        Sexually active: No.  Sexual orientation: Heterosexual.   (Last Updated: 10/9/2012 9:06:24 AM CDT by Yaa Tillman)

## 2022-02-16 NOTE — CARE COORDINATION
Pt appears on  [facility] chronic disease panel as out of parameters for Overdue services.  A1c is overdue, Cardiac calcium score recommeded cholesterol med back in July.  He is due for yearly OV in Dec.  RTC placed.  Gillian Govea CMA.

## 2022-02-16 NOTE — TELEPHONE ENCOUNTER
---------------------  From: Jennifer LEW, Lydia ATKINS (Phone Messages Pool ([id number]_[facility]))   To: Kolton Majano MD;     Cc: [address] Pool ([id number]) ;      Sent: 4/6/2021 8:06:29 AM CDT  Subject: Add on lab     Phone message    PCP:   ASHVIN      Time of Call:  [id number]       Person Calling:  Pt  Phone number:  [phone]    Returned call at: _    Note:   Pt states he is going to come in for non-fasting labs today and has had some deer ticks he's taken off recently. He is asking for a Lyme's test to be added on and for a prophylactic antibiotic to be sent in. He states he has no physical symptoms, just wants to be safe. Please advise.    Last office visit and reason:  _2/24/21, f/u COVID---------------------  From: Kolton Majano MD   To: Phone Messages [employer] ([id number]_[place] [facility] [place]);     Sent: 4/6/2021 10:31:40 AM CDT  Subject: RE: Add on lab     Put order for Lyme test with reflex  Let him know I sent Doxycycline 200mg taken in one time dose for prevention if bittenCall to pt at 1047  LVM OK for lab and Rx sent in for one time

## 2022-02-16 NOTE — NURSING NOTE
Comprehensive Intake Entered On:  9/23/2020 10:38 AM CDT    Performed On:  9/23/2020 10:31 AM CDT by Blanca Chowdhury CMA               Summary   Chief Complaint :   Follow up lab/medication   Weight Measured :   162 lb(Converted to: 162 lb 0 oz, 73.48 kg)    Height Measured :   67 in(Converted to: 5 ft 7 in, 170.18 cm)    Body Mass Index :   25.37 kg/m2 (HI)    Body Surface Area :   1.86 m2   Systolic Blood Pressure :   124 mmHg   Diastolic Blood Pressure :   70 mmHg   Mean Arterial Pressure :   88 mmHg   Peripheral Pulse Rate :   70 bpm   BP Site :   Right arm   Pulse Site :   Radial artery   BP Method :   Manual   HR Method :   Manual   Temperature Tympanic :   97.5 DegF(Converted to: 36.4 DegC)  (LOW)    Blanca Chowdhury CMA - 9/23/2020 10:31 AM CDT   Health Status   Allergies Verified? :   Yes   Medication History Verified? :   Yes   Immunizations Current :   No   Medical History Verified? :   No   Pre-Visit Planning Status :   Completed   Tobacco Use? :   Never smoker   Blanca Chowdhury CMA - 9/23/2020 10:31 AM CDT   Consents   Consent for Immunization Exchange :   Consent Granted   Consent for Immunizations to Providers :   Consent Granted   Blanca Chowdhury CMA - 9/23/2020 10:31 AM CDT   Problems   (As Of: 9/23/2020 10:38:14 AM CDT)   Problems(Active)    BPH (benign prostatic hypertrophy) (SNOMED CT  :527301993 )  Name of Problem:   BPH (benign prostatic hypertrophy) ; Recorder:   Gena Adan LPN; Confirmation:   Confirmed ; Classification:   Medical ; Code:   [id number] ; Contributor System:   [employer] ; Last Updated:   9/19/2017 9:16 AM CDT ; Life Cycle Date:   9/5/2012 ; Life Cycle Status:   Active ; Vocabulary:   SNOMED CT        Colon Polyps (SNOMED CT  :692496372 )  Name of Problem:   Colon Polyps ; Recorder:   Kodak Bauer MD; Confirmation:   Confirmed ; Classification:   Medical ; Code:   [id number] ; Contributor System:   [employer] ; Last Updated:   2/28/2014 7:10 PM CST ; Life Cycle Status:   Active  ; Responsible Provider:   Kodak Bauer MD; Vocabulary:   SNOMED CT        Diabetes mellitus (SNOMED CT  :862484877 )  Name of Problem:   Diabetes mellitus ; Recorder:   Gena Adan LPN; Confirmation:   Confirmed ; Classification:   Medical ; Code:   [id number] ; Contributor System:   [employer] ; Last Updated:   9/19/2017 9:16 AM CDT ; Life Cycle Date:   9/5/2012 ; Life Cycle Status:   Active ; Vocabulary:   SNOMED CT        Refusal of statin medication by patient (SNOMED CT  :684265287 )  Name of Problem:   Refusal of statin medication by patient ; Recorder:   Bettie Tipton; Confirmation:   Confirmed ; Classification:   Medical ; Code:   [id number] ; Contributor System:   [employer] ; Last Updated:   10/10/2017 3:35 PM CDT ; Life Cycle Date:   10/10/2017 ; Life Cycle Status:   Active ; Vocabulary:   SNOMED CT   ; Comments:        10/10/2017 3:35 PM - Bettie Tipton  Per 9/21/2017 provider note        Meds / Allergies   (As Of: 9/23/2020 10:38:14 AM CDT)   Allergies (Active)   No known allergies  Estimated Onset Date:   Unspecified ; Created By:   Gena Adan LPN; Reaction Status:   Active ; Category:   Drug ; Substance:   No known allergies ; Type:   Allergy ; Updated By:   Gena Adan LPN; Reviewed Date:   9/23/2020 10:33 AM CDT        Medication List   (As Of: 9/23/2020 10:38:14 AM CDT)   Prescription/Discharge Order    atropine-diphenoxylate  :   atropine-diphenoxylate ; Status:   Prescribed ; Ordered As Mnemonic:   Lomotil 2.5 mg-0.025 mg oral tablet ; Simple Display Line:   1 tab(s), po, tid, 90 tab(s), 5 Refill(s) ; Ordering Provider:   Kolton Majano MD; Catalog Code:   atropine-diphenoxylate ; Order Dt/Tm:   6/4/2020 11:42:36 AM CDT          bromocriptine  :   bromocriptine ; Status:   Prescribed ; Ordered As Mnemonic:   bromocriptine 0.8 mg oral tablet ; Simple Display Line:   2.4 mg, 3 tab(s), po, qam, 280 tab(s), 3 Refill(s) ; Ordering Provider:   Kolton Majano MD; Catalog Code:    bromocriptine ; Order Dt/Tm:   6/24/2020 3:09:42 PM CDT          dutasteride  :   dutasteride ; Status:   Prescribed ; Ordered As Mnemonic:   Avodart 0.5 mg oral capsule ; Simple Display Line:   0.5 mg, 1 cap(s), PO, Daily, 95 cap(s), 3 Refill(s) ; Ordering Provider:   Kolton Majano MD; Catalog Code:   dutasteride ; Order Dt/Tm:   6/24/2020 3:10:42 PM CDT          ipratropium nasal  :   ipratropium nasal ; Status:   Prescribed ; Ordered As Mnemonic:   Atrovent 42 mcg/inh nasal spray ; Simple Display Line:   2 spray(s), nasal, qid, PRN: for nasal congestion, 1 EA, 2 Refill(s) ; Ordering Provider:   Misael Crow PA-C; Catalog Code:   ipratropium nasal ; Order Dt/Tm:   5/1/2018 10:02:36 AM CDT          lidocaine topical  :   lidocaine topical ; Status:   Prescribed ; Ordered As Mnemonic:   lidocaine 5% topical ointment ; Simple Display Line:   1 nini, top, tid, 35 gm, 3 Refill(s) ; Ordering Provider:   Kolton Majano MD; Catalog Code:   lidocaine topical ; Order Dt/Tm:   12/19/2019 3:27:49 PM CST          metFORMIN  :   metFORMIN ; Status:   Prescribed ; Ordered As Mnemonic:   metFORMIN 500 mg oral tablet, extended release ; Simple Display Line:   4 tab(s), Oral, daily, 380 tab(s), 3 Refill(s) ; Ordering Provider:   Kolton Majano MD; Catalog Code:   metFORMIN ; Order Dt/Tm:   6/24/2020 3:18:49 PM CDT          sildenafil  :   sildenafil ; Status:   Prescribed ; Ordered As Mnemonic:   sildenafil 20 mg oral tablet ; Simple Display Line:   2.5 tab(s), Oral, daily, 30-60 MINUTES PRIOR TO SEXUAL ACTIVITY., PRN: AS NEEDED, 10 tab(s), 10 Refill(s) ; Ordering Provider:   Kolton Majano MD; Catalog Code:   sildenafil ; Order Dt/Tm:   6/24/2020 3:10:53 PM CDT          triamcinolone topical  :   triamcinolone topical ; Status:   Prescribed ; Ordered As Mnemonic:   triamcinolone 0.1% topical cream ; Simple Display Line:   1 nini, TOP, bid, use for no more than 2wks at a time, 60 gm, 1 Refill(s) ; Ordering Provider:   Melecio  Kolton JOHNSON; Catalog Code:   triamcinolone topical ; Order Dt/Tm:   12/19/2019 3:36:42 PM CST          venlafaxine  :   venlafaxine ; Status:   Prescribed ; Ordered As Mnemonic:   venlafaxine 75 mg oral capsule, extended release ; Simple Display Line:   75 mg, 1 cap(s), PO, Daily, 95 cap(s), 1 Refill(s) ; Ordering Provider:   Kolton Majano MD; Catalog Code:   venlafaxine ; Order Dt/Tm:   6/30/2020 6:20:30 AM CDT            Home Meds    acetaminophen  :   acetaminophen ; Status:   Documented ; Ordered As Mnemonic:   acetaminophen ; Simple Display Line:   325 mg, po, q4 hrs, PRN: as needed for pain ; Catalog Code:   acetaminophen ; Order Dt/Tm:   7/1/2014 1:21:25 PM CDT          acetaminophen-oxycodone  :   acetaminophen-oxycodone ; Status:   Documented ; Ordered As Mnemonic:   acetaminophen-oxycodone 325 mg-5 mg oral tablet ; Simple Display Line:   1 tab(s), po, daily, PRN: as needed for pain ; Catalog Code:   acetaminophen-oxycodone ; Order Dt/Tm:   9/5/2012 2:11:41 PM CDT          biotin  :   biotin ; Status:   Documented ; Ordered As Mnemonic:   biotin 300 mcg oral tablet ; Simple Display Line:   300 mcg, 1 tab(s), po, daily ; Catalog Code:   biotin ; Order Dt/Tm:   7/1/2014 1:23:25 PM CDT          ibuprofen  :   ibuprofen ; Status:   Documented ; Ordered As Mnemonic:   ibuprofen 600 mg oral tablet ; Simple Display Line:   600 mg, 1 tab(s), po, daily, PRN: as needed for pain ; Catalog Code:   ibuprofen ; Order Dt/Tm:   9/5/2012 2:09:45 PM CDT          Miscellaneous Prescription  :   Miscellaneous Prescription ; Status:   Documented ; Ordered As Mnemonic:   Vitamin C ; Simple Display Line:   See Instructions, 1000mg tablet po bid ; Catalog Code:   Miscellaneous Prescription ; Order Dt/Tm:   7/1/2014 1:22:31 PM CDT          modafinil  :   modafinil ; Status:   Documented ; Ordered As Mnemonic:   modafinil 200 mg oral tablet ; Simple Display Line:   200 mg, 1 tab(s), po, qam, PRN ; Catalog Code:   modafinil ; Order  Dt/Tm:   2/6/2014 5:07:37 PM CST            ID Risk Screen   Recent Travel History :   No recent travel   Family Member Travel History :   No recent travel   Other Exposure to Infectious Disease :   Unknown   Blanca Chowdhury CMA - 9/23/2020 10:31 AM CDT

## 2022-02-16 NOTE — PROGRESS NOTES
Patient:   NOY CRUZ            MRN: [id number]            FIN: [id number]               Age:   70 years     Sex:  Male     :  1950   Associated Diagnoses:   Cough   Author:   Misael Crow PA-C      Visit Information      Date of Service: 02/10/2021 01:51 pm  Performing Location: [facility]  Encounter#: [id number]      Primary Care Provider (PCP):  Kolton Majano MD    NPI# [id number]   Visit type:  Telephone Encounter.    Source of history:  Patient.    Location of patient:  _home  Call Start Time:   _  Call End Time:    _      Chief Complaint   2/10/2021 3:07 PM CST    Verbal consent given for a telephone visit.  Pt is c/o dry cough that started last night.     _      History of Present Illness   Today's visit was conducted via telephone due to the COVID-19 pandemic. Patient's consent to telephone visit was obtained and documented.      Reason for visit:  _ Chief complaint and symptoms noted above and confirmed with patient   dry cough started last night,  no known exposure to Covid, no fever or SOB         Review of Systems   Constitutional:  Negative.    Respiratory:  Cough, No shortness of breath, No sputum production.       Impression and Plan   Diagnosis     Cough ([id number]).     Summary:  will treat cough with benzonatate, if not improving after a few days would consider getting testing for Covid.    Orders     Orders   Pharmacy:  benzonatate 200 mg oral capsule (Prescribe): = 1 cap(s) ( 200 mg ), Oral, q8 hrs, x 10 day(s), # 30 cap(s), 0 Refill(s), Type: Acute, Pharmacy: [facility], 1 cap(s) Oral q8 hrs,x10 day(s), 67, in, 2/10/2021 3:07 PM CST, Height Measured, 162, lb, 2020 10:31 AM CDT, Weight Measured.     Orders   Charges (Evaluation and Management):  82901 physician telephone evaluation 5-10 min (Charge) (Order): Quantity: 1, Cough.        Health Status   Allergies:    Allergic Reactions (Selected)  No known allergies   Medications:  (Selected)    Prescriptions  Prescribed  Atrovent 42 mcg/inh nasal spray: 2 spray(s), nasal, qid, PRN: for nasal congestion, # 1 EA, 2 Refill(s), Type: Maintenance, Pharmacy: [facility], 2 spray(s) nasal qid,PRN:for nasal congestion  Avodart 0.5 mg oral capsule: = 1 cap(s) ( 0.5 mg ), PO, Daily, # 95 cap(s), 3 Refill(s), Type: Maintenance, Pharmacy: [facility], 1 cap(s) Oral daily, 67, in, 06/24/20 14:54:00 CDT, Height Measured, 161, lb, 06/24/20 14:54:00 CDT, Weight Measured  Lomotil 2.5 mg-0.025 mg oral tablet: 1 tab(s), po, tid, # 90 tab(s), 5 Refill(s), Type: Maintenance, Pharmacy: [facility], [place], 1 tab(s) Oral tid, 67, in, 09/23/20 10:31:00 CDT, Height Measured, 162, lb, 09/23/20 10:31:00 CDT, Weight Measured  bromocriptine 0.8 mg oral tablet: = 3 tab(s) ( 2.4 mg ), po, qam, # 280 tab(s), 3 Refill(s), Type: Maintenance, Pharmacy: [facility], 3 tab(s) Oral qam, 67, in, 06/24/20 14:54:00 CDT, Height Measured, 161, lb, 06/24/20 14:54:00 CDT, Weight Measured  lidocaine 5% topical ointment: 1 nini, top, tid, # 35 gm, 3 Refill(s), Type: Maintenance, Pharmacy: [facility], 1 nini Topical tid  metFORMIN 500 mg oral tablet, extended release: = 4 tab(s), Oral, daily, # 380 tab(s), 3 Refill(s), SHADE, Type: Maintenance, Pharmacy: [facility], Pt has appt on 2/21 will give more refills at that time, 4 tab(s) Oral daily, 67, in, 06/24/20 14:54:00 CDT, Height Measured, 161, lb, 06/24/20 14:54:0...  modafinil 200 mg oral tablet: = 1 tab(s) ( 200 mg ), po, qam, Inst[facility], # 30 tab(s), 0 Refill(s), Type: Maintenance, Pharmacy: [facility], 1 tab(s) Oral qam,Instr:[facility], 67, in, 09/23/20 10:31:00 CDT, Height Measured...  sildenafil 100 mg oral tablet: = 1 tab(s) ( 100 mg ), Oral, daily, Instructions: 1 hour before sexual activity, # 30 tab(s), 5 Refill(s), Type: Maintenance, Pharmacy: [facility], 1 tab(s) Oral daily,Instr:1 hour before sexual activity, 67, in,  20 10:31:00 CDT, Height Measur...  triamcinolone 0.1% topical cream: 1 nini, TOP, bid, Instructions: use for no more than 2wks at a time, # 60 gm, 1 Refill(s), Type: Maintenance, Pharmacy: [facility], 1 nini Topical bid,Instr:use for no more than 2wks at a time  venlafaxine 75 mg oral capsule, extended release: = 1 cap(s) ( 75 mg ), PO, Daily, # 95 cap(s), 1 Refill(s), Type: Maintenance, Pharmacy: [facility], 1 cap(s) Oral daily, 67, in, 20 14:54:00 CDT, Height Measured, 161, lb, 20 14:54:00 CDT, Weight Measured  Documented Medications  Documented  Vitamin C: Vitamin C, See Instructions, Instructions: 1000mg tablet po bid, Supply, 0 Refill(s), Type: Maintenance  acetaminophen-oxycodone 325 mg-5 mg oral tablet: 1 tab(s), po, daily, PRN: as needed for pain, 0 Refill(s), Type: Maintenance  acetaminophen: ( 325 mg ), po, q4 hrs, PRN: as needed for pain, 0 Refill(s), Type: Maintenance  biotin 300 mcg oral tablet: 1 tab(s) ( 300 mcg ), po, daily, 0 Refill(s), Type: Maintenance  ibuprofen 600 mg oral tablet: 1 tab(s) ( 600 mg ), po, daily, PRN: as needed for pain, 0 Refill(s), Type: Maintenance   Problem list:    All Problems  Diabetes mellitus / SNOMED CT 361486867 / Confirmed  Colon Polyps / SNOMED CT 626497975 / Confirmed  Refusal of statin medication by patient / SNOMED CT 866630903 / Confirmed  BPH (benign prostatic hypertrophy) / SNOMED CT 750465269 / Confirmed      Histories   Past Medical History:    Active  Diabetes mellitus (627095832)  BPH (benign prostatic hypertrophy) (627479523)  Resolved  *[facility]ospitalized@[facility] - Sessile serrated adenomas: Onset on 2014 at 63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  Resolved on 2014 at 63 years.  Tobacco user (305.1):  Resolved.   Family History:    Heart failure  Mother (Aretha, )  Stroke  Mother (Aretha, )  CABG - Coronary artery bypass graft  Father (Justin, )     Procedure history:    Cardiac computed tomography for calcium scoring (9104018016) on 2018 at 67  Years.  Comments:  7/5/2018 9:26 AM CDT - NicoleNaun nerii  Total Agatston calcium score is 393  Colonoscopy (717874876) on 11/21/2017 at 67 Years.  Comments:  11/29/2017 3:20 PM CST - Larissa Romano  Sedation:  Fentanyl and Versed  Negative for microscopic colitis  F/u in 5 years  Right hemicolectomy (974342423) on 6/26/2014 at 63 Years.  Colonoscopy (551096756) on 1/27/2009 at 58 Years.  Biopsy of prostate x 3 (691330355) in 2008 at 58 Years.  Repair of tendon of upper limb (928634690) on 2/13/2002 at 51 Years.  Extraction of wisdom tooth (773913805) in 1971 at 21 Years.   Social History:        Electronic Cigarette/Vaping Assessment            Electronic Cigarette Use: Never.      Alcohol Assessment            Current, Beer (12 oz), 1-2 times per month, 2 drinks/episode average.      Tobacco Assessment            Never (less than 100 in lifetime)            Current, Snuff - 1/2 can per day, 25 year(s).      Substance Abuse Assessment            Never      Employment and Education Assessment            Retired, Work/School description: Lt. Col., [employer].      Exercise and Physical Activity Assessment            Exercise frequency: 1-2 times/week.  Exercise type: Walking, Weight lifting.      Sexual Assessment            Sexually active: No.  Sexual orientation: Heterosexual.        Physical Examination   Measurements from flowsheet : Measurements   2/10/2021 3:07 PM CST    Height Measured - Standard                67 in

## 2022-02-16 NOTE — TELEPHONE ENCOUNTER
---------------------  From: Bety Neri CMA (eRx Pool ([id number]_[facility]))   To: [facility] Message Pool ([id number] [facility] [place]);     Sent: 9/20/2021 1:42:58 PM CDT  Subject: FW: Medication Management   Due Date/Time: 9/21/2021 12:09:00 PM CDT       Documented med      ** Patient matched by Bety Neri CMA on 9/20/2021 1:42:19 PM CDT **      ------------------------------------------  From: S[place]  To: Kolton Majano MD  Sent: September 20, 2021 12:09:01 PM CDT  Subject: Medication Management  Due: September 17, 2021 11:19:24 PM CDT     ** On Hold Pending Signature **     Drug: acetaminophen-oxycodone (acetaminophen-oxycodone 325 mg-5 mg oral tablet), TAKE 1 TABLET BY MOUTH EVERY FOUR HOURS AS NEEDED FOR PAIN  Quantity: 15 tab(s)  Days Supply: 3  Refills: 0  Substitutions Allowed  Notes from Pharmacy:     Dispensed Drug: acetaminophen-oxycodone (acetaminophen-oxycodone 325 mg-5 mg oral tablet), TAKE 1 TABLET BY MOUTH EVERY FOUR HOURS AS NEEDED FOR PAIN  Quantity: 15 tab(s)  Days Supply: 3  Refills: 0  Substitutions Allowed  Notes from Pharmacy:  ------------------------------------------Advise on refill, last office visit 7/2021 back pain with GJM---------------------  From: Blanca Chowdhury CMA ([facility] Message Pool ([id number]_[facility]))   To: Kolton Majano MD;     Sent: 9/20/2021 1:47:12 PM CDT  Subject: FW: Medication Management- acetaminophen-oxycodone   Due Date/Time: 9/21/2021 12:09:00 PM CDT---------------------  From: Kolton Majano MD   To: Blanca Chowdhury CMA;     Sent: 9/20/2021 2:26:10 PM CDT  Subject: RE: Medication Management- acetaminophen-oxycodone     15 tabs sent. Needs to be seen for any more refillsCalled and left vm with directive.

## 2022-02-16 NOTE — TELEPHONE ENCOUNTER
---------------------  From: Lakisha Garcia LPN   To: Phone Messages Pool ([id number] [facility] [place]);     Sent: 6/15/2020 10:54:50 AM CDT  Subject: lab results     PCP:   Maritza LOCK    Time of Call:  _ 0927       Person Calling:  _ pt  Phone number:  _ [phone]    Returned call at: _[phone]    Note:   _ Pt LM stating that he was wondering about the lyme test and if he was going to get antibiotics.     Per 6- letter     Your diabetes test (HgbA1c) is 8.4% with goal of < 8.0% which is increased from 7.4%. You may increase Bromocriptine to 3 tabs (2.4mg) daily  Rest of labs are good (Lyme test negative)    tried to call pt with no answer and no voice mail. I will try again laterCalled pt at [phone] and informed of message below
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
## 2022-02-16 NOTE — PROGRESS NOTES
Chief Complaint    F/u influenza, still not feeling well. Productive cough.  History of Present Illness      Diagnosed with influenza two days ago. Coughed a lot Wednesday night. Coughing decreased last night. Has productive cough. Feels fever less.       Feels better and more steady. Feels OTC medicine making him imbalanced when seen last visit.  Review of Systems      No vomiting or rashes. Has noticed wheezing  Physical Exam   Vitals & Measurements    T: 98.1(Tympanic)  HR: 74(Peripheral)  BP: 140/78     HT: 67 in  WT: 157 lb  BMI: 24.59       General: No acute distress.      HENT: No pharyngeal erythema.      Neck: No lymphadenopathy.      Respiratory: No crackles.  Diffuse mild wheezing      Cardiovascular: Normal rate, Regular rhythm.      Musculoskeletal: Normal gait.  Assessment/Plan   Reactive airway disease: We will start prednisone low-dose 10 mg a day and monitor blood sugars.  Warned of potential side effects.  Follow-up if not getting better.    Cough: Possible pneumonia.  Chest x-ray normal last visit.  Will start Zithromax if not improving.    Influenza: Improved and finish Tamiflu  Patient Information     Name:NOY CRUZ      Address:      [address] [place] [id number]     Sex:Male     YOB: 1950     [phone]     Emergency Contact:[facility] EMERGENCY, CONTACT     MRN:[id number]     FIN:[id number]     Location:[facility]     Date of Service:01/05/2018      Primary Care Physician:       Kolton Majano MD, [phone]  Medications        Vitamin C: See Instructions, 1000mg tablet po bid.        acetaminophen: 325 mg, po, q4 hrs, PRN: as needed for pain.        acetaminophen-oxycodone 325 mg-5 mg oral tablet: 1 tab(s), po, daily, PRN: as needed for pain.        Lomotil 2.5 mg-0.025 mg oral tablet: 1-2 tab(s), po, qid, 180 tab(s), 5 Refill(s).        biotin 300 mcg oral tablet: 300 mcg, 1 tab(s), po, daily.        bromocriptine 0.8 mg oral  tablet: 0.8 mg, 1 tab(s), po, qam, 95 tab(s), 3 Refill(s).        Prevalite 4 g/5.5 g oral powder for reconstitution: 4 gm, po, daily, 90 EA, 3 Refill(s).        Avodart 0.5 mg oral capsule: 0.5 mg, 1 cap(s), PO, Daily, 30 cap(s), 1 Refill(s).        ibuprofen 600 mg oral tablet: 600 mg, 1 tab(s), po, daily, PRN: as needed for pain.        lidocaine 5% topical ointment: 1 nini, top, tid, 35 gm, 3 Refill(s).        metFORMIN 500 mg oral tablet, extended release: 2,000 mg, 4 tab(s), po, daily, 370 tab(s), 3 Refill(s).        modafinil 200 mg oral tablet: 200 mg, 1 tab(s), po, qam, PRN.        Tamiflu 75 mg oral capsule: 75 mg, 1 cap(s), PO, BID, for 5 day(s), 10 cap(s), 0 Refill(s).        Viagra 100 mg oral tablet: 50 mg, 0.5 tab(s), po, daily, take when needed., 10 tab(s), 3 Refill(s).        triamcinolone 0.1% topical cream: 1 nini, TOP, bid, use for no more than 2wks at a time, 60 gm, 1 Refill(s).        venlafaxine 75 mg oral capsule, extended release: 75 mg, 1 cap(s), PO, Daily, 30 cap(s).                Allergies    No known allergies

## 2022-02-16 NOTE — LETTER
(Inserted Image. Unable to display)   March 25, 2020      NOY CRUZ  [address] [place], [place] [id number]        Dear NOY,      Thank you for selecting [facility] for your healthcare needs.     FINDINGS: No pulmonary parenchymal or pleural abnormality to suggest  pneumoconiosis. The lungs are clear. No pleural effusion or thickening. Normal  cardiomediastinal silhouette. Hypertrophic degenerative changes spine.     CONCLUSION:   1.  Normal.        Dr. Behzad Thurston  [facility] Certified [facility]          Please contact me or my assistant at [phone] if you have any questions or concerns.     Sincerely,        Bay Majano MD

## 2022-02-16 NOTE — NURSING NOTE
Quick Intake Entered On:  6/11/2020 3:29 PM CDT    Performed On:  6/11/2020 3:28 PM CDT by Yeny Thakur RN               Summary   Weight Measured :   164.4 lb(Converted to: 164 lb 6 oz, 74.57 kg)    Height Measured :   67 in(Converted to: 5 ft 7 in, 170.18 cm)    Body Mass Index :   25.75 kg/m2 (HI)    Body Surface Area :   1.88 m2   Systolic Blood Pressure :   117 mmHg   Diastolic Blood Pressure :   73 mmHg   Mean Arterial Pressure :   88 mmHg   Peripheral Pulse Rate :   82 bpm   Yeny Thakur RN - 6/11/2020 3:28 PM CDT

## 2022-02-16 NOTE — TELEPHONE ENCOUNTER
Entered by Jennifer Bryant on October 08, 2021 8:57:09 AM CDT  LF 9/23/20 #30 R5  LV 7/19/21 back pain   RTC 4/12/21 return July 2021 for labs   Per chart, pt never came to get labs in July     ** Patient matched by Jennifer Bryant on 10/8/2021 8:53:17 AM CDT **      ------------------------------------------  From: [employer]  To: Kolton Majano MD  Sent: October 7, 2021 2:12:14 PM CDT  Subject: Medication Management  Due: October 2, 2021 3:22:27 PM CDT     ** On Hold Pending Signature **     Drug: sildenafil (sildenafil 100 mg oral tablet), TAKE ONE TABLET BY MOUTH ONCE DAILY ONE HOUR BEFORE SEXUAL ACTIVITY  Quantity: 30 tab(s)  Days Supply: 30  Refills: 5  Substitutions Allowed  Notes from Pharmacy:     Dispensed Drug: sildenafil (sildenafil 100 mg oral tablet), TAKE ONE TABLET BY MOUTH ONCE DAILY ONE HOUR BEFORE SEXUAL ACTIVITY  Quantity: 30 tab(s)  Days Supply: 30  Refills: 5  Substitutions Allowed  Notes from Pharmacy:  ---------------------------------------------------------------  From: Jennifer Bryant ([facility] ([facility]))   To: [facility] ([facility] [facility]);     Sent: 10/8/2021 8:57:13 AM CDT  Subject: FW: Medication Management   Due Date/Time: 10/8/2021 2:12:00 PM CDT---------------------  From: Blanca Chowdhury CMA ([employer] [facility] ([facility]))   To: Kolton Majano MD;     Sent: 10/8/2021 9:43:12 AM CDT  Subject: FW: Medication Management   Due Date/Time: 10/8/2021 2:12:00 PM CDT---------------------  From: Kolton Majano MD   To: Blanca Chowdhury CMA;     Sent: 10/8/2021 12:02:20 PM CDT  Subject: RE: Medication Management     Done

## 2022-02-16 NOTE — LETTER
(Inserted Image. Unable to display)   May 22, 2019        NOY CRUZ  [address]  [place], [place] [id number]        Dear NOY,      Thank you for selecting [facility] ([facility]viously [facility]) for your healthcare needs.    Our records indicate you are due for the following services:    Non-Fasting Labs    If you had your labs done at another facility or with Direct Access Lab Testing at [facility], please bring in a copy of the results to your next visit, mail a copy, or drop off a copy of your results to your Healthcare Provider.  Diabetic Exam ~ Please bring your glucose meter and/or your blood glucose diary to your appointment.    You are due for lab work and an office visit, please schedule the lab appointment 1 week before the office visit.  This will assure all results are available to discuss with your provider during your visit.    **It is very helpful if you bring your medication bottles to your appointment.  This assures we have all of your current medications, including strength and dosing information, documented accurately in your medical record.    To schedule an appointment or if you have further questions, please contact your primary clinic:   [facility]       [phone]   [facility]       [phone]              [facility]     [phone]      Powered by [employer]    Sincerely,    Kolton Majano M.D.

## 2022-02-16 NOTE — LETTER
(Inserted Image. Unable to display)   September 22, 2020      NOY CRUZ      [address] [place], [place] [id number]        Dear NOY,    Thank you for selecting [facility] for your healthcare needs.  Below you will find the results of your recent tests done at our clinic.     Your diabetes test (HgbA1c) is 6.6% with goal of < 8.0% and improved from 8.4% (nice work!)      Result Name Current Result Previous Result Reference Range   Hgb A1c ((H)) 6.6 9/21/2020 ((H)) 8.4 6/11/2020  ((H)) 7.4 12/16/2019  - <5.7       Please contact me or my assistant at [phone] if you have any questions about your results.     Sincerely,        Kolton Majano MD        What do your labs mean?  Below is a glossary of commonly ordered labs:  LDL   Bad Cholesterol   HDL   Good Cholesterol  AST/ALT   Liver Function   Cr/Creatinine   Kidney Function  Microalbumin   Kidney Function  BUN   Kidney Function  PSA   Prostate    TSH   Thyroid Hormone  HgbA1c   Diabetes Test   Hgb (Hemoglobin)   Red Blood Cells

## 2022-02-16 NOTE — TELEPHONE ENCOUNTER
---------------------  From: Ina Laughlin MA ([facility] ([facility]))   To: [facility] ([facility]);     Sent: 11/16/2020 9:00:21 AM CST  Subject: FW: Medication Management   Due Date/Time: 11/14/2020 12:19:00 PM CST     Medication Refill needing approval    PCP:   ASHVIN--OC until 11/18/2020    Medication:   modafinil  Last Filled:  9/23/2020    Quantity:  30  Refills:  0    Date of last office visit and reason:   9/23/2020 w/ ASHVIN for med check    Date of last Med Check / Px:   _  Date of last labs pertaining to condition:  _    Note:  _    Return to Clinic order placed?  RTC due Dec 2020    Resource:   [facility]  Phone:   _  Fax:  _            ** Not Approved: Refill not appropriate, duplicate entry **  modafinil (MODAFINIL 200MG TABLET)  TAKE ONE TABLET BY MOUTH EVERY MORNING  Qty:  30 EA        Days Supply:  30        Refills:  0          Substitutions Allowed     Route To Pharmacy - [facility]   Signed by Ina Laughlin MA            ------------------------------------------  From: [facility]  To: Kolton Majano MD  Sent: November 13, 2020 12:19:00 PM CST  Subject: Medication Management  Due: November 7, 2020 12:21:53 AM CST     ** On Hold Pending Signature **     Drug: modafinil (modafinil 200 mg oral tablet), TAKE ONE TABLET BY MOUTH EVERY MORNING  Quantity: 30 EA  Days Supply: 30  Refills: 0  Substitutions Allowed  Notes from Pharmacy:     Dispensed Drug: modafinil (modafinil 200 mg oral tablet), TAKE ONE TABLET BY MOUTH EVERY MORNING  Quantity: 30 EA  Days Supply: 30  Refills: 0  Substitutions Allowed  Notes from Pharmacy:     ** On Hold Pending Signature **     Drug: modafinil (modafinil 200 mg oral tablet), TAKE ONE TABLET BY MOUTH EVERY MORNING  Quantity: 30 EA  Days Supply: 30  Refills: 0  Substitutions Allowed  Notes from Pharmacy:     Dispensed Drug: modafinil (modafinil 200 mg oral tablet), TAKE ONE TABLET BY MOUTH EVERY MORNING  Quantity: 30  EA  Days Supply: 30  Refills: 0  Substitutions Allowed  Notes from Pharmacy:  ------------------------------------------Refill sent in---------------------  From: Misael Crow PA-C   To: [facility]    Sent: 11/16/2020 9:03:55 AM CST  Subject: FW: Medication Management     ** Not Approved: filled by alternate method **  modafinil (MODAFINIL 200MG TABLET)  TAKE ONE TABLET BY MOUTH EVERY MORNING  Qty:  30 EA        Days Supply:  30        Refills:  0          Substitutions Allowed     Route To Pharmacy - [facility]   Signed by Misael Crow PA-C

## 2022-02-16 NOTE — NURSING NOTE
Comprehensive Intake Entered On:  2/10/2021 3:11 PM CST    Performed On:  2/10/2021 3:07 PM CST by Alberto Palumbo CMA               Summary   Chief Complaint :   Verbal consent given for a telephone visit.  Pt is c/o dry cough that started last night.   Height Measured :   67 in(Converted to: 5 ft 7 in, 170.18 cm)    Alberto Palumbo CMA - 2/10/2021 3:07 PM CST   Health Status   Allergies Verified? :   Yes   Medication History Verified? :   Yes   Immunizations Current :   No   Medical History Verified? :   Yes   Pre-Visit Planning Status :   Not completed   Tobacco Use? :   Never smoker   Alberto Palumbo CMA - 2/10/2021 3:07 PM CST   Meds / Allergies   (As Of: 2/10/2021 3:11:45 PM CST)   Allergies (Active)   No known allergies  Estimated Onset Date:   Unspecified ; Created By:   Gena Adan LPN; Reaction Status:   Active ; Category:   Drug ; Substance:   No known allergies ; Type:   Allergy ; Updated By:   Gena Adan LPN; Reviewed Date:   2/10/2021 3:10 PM CST        Medication List   (As Of: 2/10/2021 3:11:45 PM CST)   Prescription/Discharge Order    modafinil  :   modafinil ; Status:   Prescribed ; Ordered As Mnemonic:   modafinil 200 mg oral tablet ; Simple Display Line:   200 mg, 1 tab(s), po, qam, [facility], [facility] tab(s), 0 Refill(s) ; Ordering Provider:   Kolton Majano MD; Catalog Code:   modafinil ; Order Dt/Tm:   12/28/2020 8:36:19 AM CST          atropine-diphenoxylate  :   atropine-diphenoxylate ; Status:   Prescribed ; Ordered As Mnemonic:   Lomotil 2.5 mg-0.025 mg oral tablet ; Simple Display Line:   1 tab(s), po, tid, 90 tab(s), 5 Refill(s) ; Ordering Provider:   Kolton Majano MD; Catalog Code:   atropine-diphenoxylate ; Order Dt/Tm:   12/9/2020 3:05:34 PM CST          sildenafil  :   sildenafil ; Status:   Prescribed ; Ordered As Mnemonic:   sildenafil 100 mg oral tablet ; Simple Display Line:   100 mg, 1 tab(s), Oral, daily, 1 hour before sexual activity, 30 tab(s), 5  Refill(s) ; Ordering Provider:   Kolton Majano MD; Catalog Code:   sildenafil ; Order Dt/Tm:   9/23/2020 10:54:21 AM CDT          venlafaxine  :   venlafaxine ; Status:   Prescribed ; Ordered As Mnemonic:   venlafaxine 75 mg oral capsule, extended release ; Simple Display Line:   75 mg, 1 cap(s), PO, Daily, 95 cap(s), 1 Refill(s) ; Ordering Provider:   Kolton Majano MD; Catalog Code:   venlafaxine ; Order Dt/Tm:   6/30/2020 6:20:30 AM CDT          metFORMIN  :   metFORMIN ; Status:   Prescribed ; Ordered As Mnemonic:   metFORMIN 500 mg oral tablet, extended release ; Simple Display Line:   4 tab(s), Oral, daily, 380 tab(s), 3 Refill(s) ; Ordering Provider:   Kolton Majano MD; Catalog Code:   metFORMIN ; Order Dt/Tm:   6/24/2020 3:18:49 PM CDT          dutasteride  :   dutasteride ; Status:   Prescribed ; Ordered As Mnemonic:   Avodart 0.5 mg oral capsule ; Simple Display Line:   0.5 mg, 1 cap(s), PO, Daily, 95 cap(s), 3 Refill(s) ; Ordering Provider:   Kolton Majano MD; Catalog Code:   dutasteride ; Order Dt/Tm:   6/24/2020 3:10:42 PM CDT          bromocriptine  :   bromocriptine ; Status:   Prescribed ; Ordered As Mnemonic:   bromocriptine 0.8 mg oral tablet ; Simple Display Line:   2.4 mg, 3 tab(s), po, qam, 280 tab(s), 3 Refill(s) ; Ordering Provider:   Kolton Majano MD; Catalog Code:   bromocriptine ; Order Dt/Tm:   6/24/2020 3:09:42 PM CDT          triamcinolone topical  :   triamcinolone topical ; Status:   Prescribed ; Ordered As Mnemonic:   triamcinolone 0.1% topical cream ; Simple Display Line:   1 nini, TOP, bid, use for no more than 2wks at a time, 60 gm, 1 Refill(s) ; Ordering Provider:   Kolton Majano MD; Catalog Code:   triamcinolone topical ; Order Dt/Tm:   12/19/2019 3:36:42 PM CST          lidocaine topical  :   lidocaine topical ; Status:   Prescribed ; Ordered As Mnemonic:   lidocaine 5% topical ointment ; Simple Display Line:   1 nini, top, tid, 35 gm, 3 Refill(s) ; Ordering Provider:    Kolton Majano MD; Catalog Code:   lidocaine topical ; Order Dt/Tm:   12/19/2019 3:27:49 PM CST          ipratropium nasal  :   ipratropium nasal ; Status:   Prescribed ; Ordered As Mnemonic:   Atrovent 42 mcg/inh nasal spray ; Simple Display Line:   2 spray(s), nasal, qid, PRN: for nasal congestion, 1 EA, 2 Refill(s) ; Ordering Provider:   Tristin MORELOS, Misael CHAKRABORTY; Catalog Code:   ipratropium nasal ; Order Dt/Tm:   5/1/2018 10:02:36 AM CDT            Home Meds    biotin  :   biotin ; Status:   Documented ; Ordered As Mnemonic:   biotin 300 mcg oral tablet ; Simple Display Line:   300 mcg, 1 tab(s), po, daily ; Catalog Code:   biotin ; Order Dt/Tm:   7/1/2014 1:23:25 PM CDT          Miscellaneous Prescription  :   Miscellaneous Prescription ; Status:   Documented ; Ordered As Mnemonic:   Vitamin C ; Simple Display Line:   See Instructions, 1000mg tablet po bid ; Catalog Code:   Miscellaneous Prescription ; Order Dt/Tm:   7/1/2014 1:22:31 PM CDT          acetaminophen  :   acetaminophen ; Status:   Documented ; Ordered As Mnemonic:   acetaminophen ; Simple Display Line:   325 mg, po, q4 hrs, PRN: as needed for pain ; Catalog Code:   acetaminophen ; Order Dt/Tm:   7/1/2014 1:21:25 PM CDT          ibuprofen  :   ibuprofen ; Status:   Documented ; Ordered As Mnemonic:   ibuprofen 600 mg oral tablet ; Simple Display Line:   600 mg, 1 tab(s), po, daily, PRN: as needed for pain ; Catalog Code:   ibuprofen ; Order Dt/Tm:   9/5/2012 2:09:45 PM CDT          acetaminophen-oxycodone  :   acetaminophen-oxycodone ; Status:   Documented ; Ordered As Mnemonic:   acetaminophen-oxycodone 325 mg-5 mg oral tablet ; Simple Display Line:   1 tab(s), po, daily, PRN: as needed for pain ; Catalog Code:   acetaminophen-oxycodone ; Order Dt/Tm:   9/5/2012 2:11:41 PM CDT            ID Risk Screen   Recent Travel History :   No recent travel   Family Member Travel History :   No recent travel   Other Exposure to Infectious Disease :   Unknown    COVID-19 Testing Status :   No COVID-19 test performed   Alberto Palumbo CMA - 2/10/2021 3:07 PM CST   Social History   Social History   (As Of: 2/10/2021 3:11:45 PM CST)   Alcohol:        Current, Beer (12 oz), 1-2 times per month, 2 drinks/episode average.   (Last Updated: 6/5/2014 6:36:23 PM CDT by Elsie Sellers CMA)          Tobacco:        Current, Snuff - 1/2 can per day, 25 year(s).   (Last Updated: 7/1/2014 1:00:11 PM CDT by Elsie Sellers CMA)   Never (less than 100 in lifetime)   (Last Updated: 2/10/2021 3:11:14 PM CST by Alberto Palumbo CMA)          Electronic Cigarette/Vaping:        Electronic Cigarette Use: Never.   (Last Updated: 2/10/2021 3:10:53 PM CST by Alberto Palumbo CMA)          Substance Abuse:        Never   (Last Updated: 10/9/2012 9:09:03 AM CDT by Yaa Tillman)          Employment/School:        Retired, Work/School description: Lt. Col., [employer].   (Last Updated: 10/9/2012 9:10:02 AM CDT by Yaa Tillman)          Exercise:        Exercise frequency: 1-2 times/week.  Exercise type: Walking, Weight lifting.   (Last Updated: 10/9/2012 9:08:01 AM CDT by Yaa Tillman)          Sexual:        Sexually active: No.  Sexual orientation: Heterosexual.   (Last Updated: 10/9/2012 9:06:24 AM CDT by Yaa Tillman)

## 2022-02-16 NOTE — LETTER
(Inserted Image. Unable to display)   February 19, 2019      NOY CRUZ      [address] [place], [place] [id number]        Dear NOY,    Thank you for selecting [facility] for your healthcare needs.  Below you will find the results of your recent tests done at our clinic.     Your diabetes test (HgbA1c) is 7.8% with goal of < 8.0%. This is increasing. Recheck in June 2019  Rest of labs are good.      Result Name Current Result Previous Result Reference Range   Sodium Level (mmol/L)  139 2/18/2019  135 - 146   Potassium Level (mmol/L)  4.6 2/18/2019  3.5 - 5.3   Glucose Level (mg/dL) ((H)) 178 2/18/2019  65 - 99   Creatinine Level (mg/dL)  0.82 2/18/2019  0.86 12/21/2017 0.70 - 1.25   Hgb A1c ((H)) 7.8 2/18/2019 ((H)) 6.1 12/21/2017  ((H)) 7.4 8/30/2017  - <5.7   Cholesterol (mg/dL)  119 2/18/2019  143 12/21/2017  - <200   HDL (mg/dL)  55 2/18/2019  >40 -    LDL  46 2/18/2019     Triglyceride (mg/dL)  101 2/18/2019   - <150   Iron Saturation  36 2/18/2019  15 - 60   PSA (ng/mL)  0.7 2/18/2019  1.0 12/21/2017   1.5 5/10/2017  - < OR = 4.0   WBC  5.0 2/18/2019  3.8 - 10.8   Hgb (gm/dL)  13.8 2/18/2019  14.9 1/2/2018   13.4 12/21/2017 13.2 - 17.1   Platelet  285 2/18/2019  140 - 400       Please contact me or my assistant at [phone] if you have any questions about your results.     Sincerely,        Kolton Majano MD        What do your labs mean?  Below is a glossary of commonly ordered labs:  LDL   Bad Cholesterol   HDL   Good Cholesterol  AST/ALT   Liver Function   Cr/Creatinine   Kidney Function  Microalbumin   Kidney Function  BUN   Kidney Function  PSA   Prostate    TSH   Thyroid Hormone  HgbA1c   Diabetes Test   Hgb (Hemoglobin)   Red Blood Cells

## 2022-02-16 NOTE — PROGRESS NOTES
Patient:   NOY CRUZ            MRN: [id number]            FIN: [id number]               Age:   69 years     Sex:  Male     :  1950   Associated Diagnoses:   Chronic diarrhea; History of anemia; Diabetes Mellitus; Pruritus, Perianal   Author:   Kolton Majano MD      Visit Information      Date of Service: 2020 02:46 pm  Performing Location: [facility]  Encounter#: [id number]      Primary Care Provider (PCP):  Kolton Majano MD    NPI# [id number]      Referring Provider:  Kolton Majano MD    NPI# [id number]      Chief Complaint   2020 2:54 PM CDT    Medication management and follow up labs      History of Present Illness   Started Bromocriptine 2017 for diabetes. No significant side effects. Increasing exercise and improving diet since finding out HgbA1c 8.4%. Increased bromocriptine    Chewing rarely. Using nicorrette. Wants to quit and concerned that chewing could have contributed to the colon polyps. Chews copehagen 2-3 cans a week. Chewing for 30 years. Used an herbal supplement and gum. Using nicorette lozenges.    Had hemorrhoids surgically fixed . Continues to have pain and itching intermittently but less frequently. When has rectal pain 1x a week triamcinolone 0.1% cream and lidocaine helps.    Had loose stools 3-4x a day until starting cholestyramine. Now has 1-2 stools usually formed. Still taking lomotil 3 tabs a day. Loose stools started following the right hemicolectomy . Still has gallbladder.    Takes percocet about 8-10 tabs a month for various chronic pains. [facility]ia)  Will be getting a cervical injection  Uses tylenol daily for lower back pain and neck pain    Diabetes diagnosed in .  Everything hurts when walks a mile. Most pain in left achilles since injury .  Weight is stable  Takes modafinil for sleep apnea and [facility] by [facility] when [facility]  Takes effexor  Prostate 8.5 in  with normal  biopsies  [place] jump with injury in neck and has had chronic issues and chronic pain         Review of Systems   Respiratory:  No shortness of breath.    Cardiovascular:  No chest pain.    Gastrointestinal:  No vomiting.    PHQ-9: 2pts (December 2019). 7pts (September 2017). 2pts (February 2016)  Eczema in the winter  CAGE: 0pts and minimal alcohol  No longer having black stools      Health Status   Allergies:    Allergic Reactions (Selected)  No known allergies   Medications:  (Selected)   Prescriptions  Prescribed  Atrovent 42 mcg/inh nasal spray: 2 spray(s), nasal, qid, PRN: for nasal congestion, # 1 EA, 2 Refill(s), Type: Maintenance, Pharmacy: [facility], 2 spray(s) nasal qid,PRN:for nasal congestion  Avodart 0.5 mg oral capsule: = 1 cap(s) ( 0.5 mg ), PO, Daily, # 95 cap(s), 3 Refill(s), Type: Maintenance, Pharmacy: [facility], 1 cap(s) Oral daily  Lomotil 2.5 mg-0.025 mg oral tablet: 1 tab(s), po, tid, # 90 tab(s), 5 Refill(s), Type: Maintenance, Pharmacy: [facility], [place], 1 tab(s) Oral tid, 67, in, 12/19/19 15:02:00 CST, Height Measured, 162, lb, 12/19/19 15:02:00 CST, Weight Measured  bromocriptine 0.8 mg oral tablet: = 2 tab(s) ( 1.6 mg ), po, qam, # 190 tab(s), 3 Refill(s), Type: Maintenance, Pharmacy: [facility], 2 tab(s) Oral qam  lidocaine 5% topical ointment: 1 nini, top, tid, # 35 gm, 3 Refill(s), Type: Maintenance, Pharmacy: [facility], 1 nini Topical tid  metFORMIN 500 mg oral tablet, extended release: = 4 tab(s), Oral, daily, # 380 tab(s), 3 Refill(s), SHADE, Type: Maintenance, Pharmacy: [facility], Pt has appt on 2/21 will give more refills at that time, 4 tab(s) Oral daily  sildenafil 20 mg oral tablet: = 2.5 tab(s), Oral, daily, Instructions: 30-60 MINUTES PRIOR TO SEXUAL ACTIVITY., PRN: AS NEEDED, # 10 tab(s), 10 Refill(s), SHADE, Type: Maintenance, Pharmacy: [facility], Has appt 2/21 will give more refills at that time, 2.5 tab(s) Oral daily,PRN:A...  triamcinolone 0.1%  topical cream: 1 nini, TOP, bid, Instructions: use for no more than 2wks at a time, # 60 gm, 1 Refill(s), Type: Maintenance, Pharmacy: [facility], 1 nini Topical bid,Instr:use for no more than 2wks at a time  Documented Medications  Documented  Vitamin C: Vitamin C, See Instructions, Instructions: 1000mg tablet po bid, Supply, 0 Refill(s), Type: Maintenance  acetaminophen-oxycodone 325 mg-5 mg oral tablet: 1 tab(s), po, daily, PRN: as needed for pain, 0 Refill(s), Type: Maintenance  acetaminophen: ( 325 mg ), po, q4 hrs, PRN: as needed for pain, 0 Refill(s), Type: Maintenance  biotin 300 mcg oral tablet: 1 tab(s) ( 300 mcg ), po, daily, 0 Refill(s), Type: Maintenance  ibuprofen 600 mg oral tablet: 1 tab(s) ( 600 mg ), po, daily, PRN: as needed for pain, 0 Refill(s), Type: Maintenance  modafinil 200 mg oral tablet: 1 tab(s) ( 200 mg ), po, qam, Instructions: PRN, 0 Refill(s), Type: Maintenance  venlafaxine 75 mg oral capsule, extended release: 1 cap(s) ( 75 mg ), PO, Daily, # 30 cap(s), 0 Refill(s), Type: Maintenance   Problem list:    All Problems  Colon Polyps / SNOMED CT 013880046 / Confirmed  BPH (benign prostatic hypertrophy) / SNOMED CT 459391653 / Confirmed  Diabetes mellitus / SNOMED CT 138451038 / Confirmed  Refusal of statin medication by patient / SNOMED CT 754361900 / Confirmed  Resolved: *[facility] - Sessile serrated adenomas  Resolved: Tobacco user / ICD-9-.1      Histories   Procedure history:    Cardiac computed tomography for calcium scoring (SNOMED CT 7947325336) on 7/2/2018 at 67 Years.  Comments:  7/5/2018 9:26 AM CDT - Kailee Woodward  Total Agatston calcium score is 393  Colonoscopy (SNOMED CT 886955419) performed by Kolton Majano MD on 11/21/2017 at 67 Years.  Comments:  11/29/2017 3:20 PM CST - Larissa Romano  Sedation:  Fentanyl and Versed  Negative for microscopic colitis  F/u in 5 years  Right hemicolectomy (SNOMED CT 910481659) performed by Nitesh Whitehead MD on  2014 at 63 Years.  Colonoscopy (SNOMED CT 645009540) performed by Shira Christian MD on 2009 at 58 Years.  Biopsy of prostate x 3 (SNOMED CT 634203695) in  at 58 Years.  Repair of tendon of upper limb (SNOMED CT 772220404) on 2002 at 51 Years.  Extraction of wisdom tooth (SNOMED CT 038427236) in  at 21 Years.     Family History: No colon cancer or polyps on Dad side but mom side unknown, Mom  age 93 from stroke/CHF. Dad  heart attack age 65 and had CABG in 50's. Brother is healthy  Social History: Retired [employer]  (33 years). Currently chewing 2-3 cans a week down from 4-5. Single (never ) and no children      Physical Examination   Vital Signs   2020 2:54 PM CDT Temperature Tympanic 97.1 DegF  LOW    Peripheral Pulse Rate 76 bpm    Pulse Site Radial artery    HR Method Manual    Systolic Blood Pressure 122 mmHg    Diastolic Blood Pressure 76 mmHg    Mean Arterial Pressure 91 mmHg    BP Site Right arm    BP Method Manual      Measurements from flowsheet : Measurements   2020 2:54 PM CDT Height Measured - Standard 67 in    Weight Measured - Standard 161 lb    BSA 1.86 m2    Body Mass Index 25.21 kg/m2  HI      General:  Alert and oriented, No acute distress.    Respiratory:  Lungs are clear to auscultation.    Cardiovascular:  Normal rate, Regular rhythm, No edema.    Musculoskeletal:  Normal gait.    Neurologic: normal monofilament testing (2019)  Psychiatric: word recall immediate 3/3 and delayed 3/3 (2019)  Extremities: No leg swelling         Review / Management   Cardiac Calcium score (2018): 393pts with 77th percentile      Results review:  Lab results   2020 3:35 PM CDT TSH 0.72 mIU/L    Lyme Ab IgG/IgM WB <0.90   2020 3:13 PM CDT Sodium Level 138 mmol/L    Potassium Level 4.9 mmol/L    Glucose Level 208 mg/dL  HI    Creatinine Level 0.93 mg/dL    Hgb A1c 8.4  HI    Cholesterol 135 mg/dL    HDL 52 mg/dL    LDL 62    Iron Saturation  43    PSA 0.7 ng/mL    Hgb 13.3 gm/dL    Platelet 261   .       Impression and Plan   Diagnosis     Chronic diarrhea (ACX31-VS K52.9).     History of anemia (MSN58-EC Z86.2).     Diabetes Mellitus (WMH23-RT E11.9).     Pruritus, Perianal (KJB69-PT L29.0).       Diabetes: increase bromocriptine to 2.4mg daily with HgbA1c 8.4% (June 2020). Continue metformin 2000mg daily. Discussed and declines statin given diabetes even with low LDL Taking aspirin.    Anal pruritis: lidocaine has worked well. Seldom needs anymore  Diarrhea: continue lomotil 2-3x daily (December 2019)  Anemia: has corrected with normal iron stores. Will continue to monitor CBC  Colon cancer screening: had right hemicolectomy for large polyps 2014. Normal colonoscopy 2009. Two colonoscopy prior to 2009. Normal colonoscopy November 2017 and repeat in 2022  Asbestosis exposure: [place] in the . CXR 2017 and 2020 negative and repeat 2025  Immunizations[facility]  Eczema in the winter and triamcinolone helps  PSA 0.70-1.42 since 2012  Refilled viagra (declines STD testing), lomotil, metformin, bromocriptine February 2019  Renewed medications June 2020    Reviewed [place] [facility] [place]x Monitoring Program June 2020

## 2022-02-16 NOTE — PROGRESS NOTES
Seen for COVID testing at [facility] per Dr. Radha Boateng    O2 Sat = 96%  (Children under 12 do not require O2 sat)    Specimen sent to:  [facility]    PUI form faxed to: [facility].

## 2022-02-16 NOTE — LETTER
(Inserted Image. Unable to display)         December 24, 2020        NOY CRUZ  [address] [place], [place] [id number]        Dear NOY,    Thank you for selecting [facility] for your healthcare needs.    Our records indicate you are due for the following services:     Non-Fasting Labs    If you had your labs done at another facility or with Direct Access Lab Testing at [facility], please bring in a copy of the results to your next visit, mail a copy, or drop off a copy of your results to your Healthcare Provider.     (FYI   Regarding office visits: In some instances, a video visit or telephone visit may be offered as an option.)    To schedule an appointment or if you have further questions, please contact your clinic at [phone].    Powered by [employer]    Sincerely,    Kolton Majano MD

## 2022-02-16 NOTE — TELEPHONE ENCOUNTER
---------------------  From: Sharmaine SOSAMargarette (eRx Pool ([id number]))   To: Misael Crow PA-C;     Sent: 1/8/2021 9:59:53 AM CST  Subject: FW: Medication Management   Due Date/Time: 1/8/2021 4:32:00 PM CST     [facility]  2nd request for med refill  contacted pt states he uses nightly  due to itching in ears    seen 9/3 and med filled at that time, please advise       ------------------------------------------  From: [employer]  To: Misael Crow PA-C  Sent: January 7, 2021 4:32:12 PM CST  Subject: Medication Management  Due: January 6, 2021 9:13:55 PM CST     ** On Hold Pending Signature **     Drug: hydrocortisone/neomycin/polymyxin B otic (hydrocortisone/neomycin/polymyxin B 1%-0.35%-10,000 units/mL otic suspension), INSTILL FOUR DROPS IN EACH EAR THREE TIMES A DAY FOR SEVEN DAY(S)  Quantity: 10 mL  Days Supply: 7  Refills: 0  Substitutions Allowed  Notes from Pharmacy:     Dispensed Drug: hydrocortisone/neomycin/polymyxin B otic (hydrocortisone/neomycin/polymyxin B 1%-0.35%-10,000 units/mL otic suspension), INSTILL FOUR DROPS IN EACH EAR THREE TIMES A DAY FOR SEVEN DAY(S)  Quantity: 10 mL  Days Supply: 7  Refills: 0  Substitutions Allowed  Notes from Pharmacy:  ------------------------------------------needs appointment---------------------  From: Misael Crow PA-C   To: [facility]    Sent: 1/8/2021 10:24:06 AM CST  Subject: FW: Medication Management     ** Not Approved: Patient needs appointment **  hydrocortisone/neomycin/polymyxin B otic (NEOMYCIN/POLYMYXIN/HYDROCORTISONE 1% OTIC SUSPENSION)  INSTILL FOUR DROPS IN EACH EAR THREE TIMES A DAY FOR SEVEN DAY(S)  Qty:  10 mL        Days Supply:  7        Refills:  0          Substitutions Allowed     Route To Pharmacy - [facility]

## 2022-02-16 NOTE — LETTER
(Inserted Image. Unable to display)   July 19, 2021      NOY CRUZ  [address] [place] [address]        Dear NOY,      Thank you for selecting [facility] for your healthcare needs.    Cyclocet (Bromocriptine) has been helping him control his diabetes and keep his HgbA1c under 8.0%          Please contact me or my assistant at [phone] if you have any questions or concerns.     Sincerely,

## 2022-02-16 NOTE — TELEPHONE ENCOUNTER
---------------------  From: Martina Lin CMA (eRx Pool ([id number]))   To: Kolton Majano MD;     Sent: 9/10/2020 1:32:36 PM CDT  Subject: FW: Medication Management   Due Date/Time: 9/10/2020 10:50:00 AM CDT       PCP:   [facility]    Medication:   Sildenafil  Last Filled:  6/24/20    Quantity:  10  Refills:  10  CSA on file?   N/A     Date of last office visit and reason:   9/3/20 Plugged ears w/ DWG  Date of last labs pertaining to condition:  _    Note:  Advise fill    Return to Clinic order placed?  Due for A1C/DM this month    Resource:   [facility]  Phone:   _        ** Patient matched by Martina Lin CMA on 9/10/2020 1:29:17 PM CDT **        ------------------------------------------  From: [employer]  To: Kolton Majano MD  Sent: September 9, 2020 10:50:28 AM CDT  Subject: Medication Management  Due: September 9, 2020 4:20:39 PM CDT     ** On Hold Pending Signature **     Dispensed Drug: sildenafil (sildenafil 100 mg oral tablet), TAKE ONE-HALF TABLET BY MOUTH AS NEEDED 30-60 MINUTES PRIOR TO SEXUAL ACTIVITY - DOSE CHANGE  Quantity: 5 unknown unit  Days Supply: 0  Refills: 0  Substitutions Allowed  Notes from Pharmacy:  ---------------------------------------------------------------  From: Kolton Majano MD   To: eRx Pool ([id number]); Blanca Chowdhury CMA;     Sent: 9/10/2020 6:46:16 PM CDT  Subject: RE: Medication Management     Please give a month and 3 refills. Call and find out if he wants the 100mg tab or 20mg tab (has been prescribed the 20mg tab recently)Tried calling pt, no answer and no VMTried calling pt and no answer and no VM---------------------  From: Blanca Chowdhury CMA   To: [facility]    Sent: 9/17/2020 1:09:33 PM CDT  Subject: RE: Medication Management     ** Not Approved: Patient should contact Prescriber first, multiple attempts to contact pt for clarification on dose **  sildenafil (SILDENAFIL   TAB 100MG TABLET)  TAKE ONE-HALF TABLET BY MOUTH AS NEEDED 30-60  MINUTES PRIOR TO SEXUAL ACTIVITY - DOSE CHANGE  Qty:  5 unknown unit        Days Supply:  0        Refills:  0          Substitutions Allowed     Route To Pharmacy - [facility]   Signed by Blanca Chowdhury CMA

## 2022-02-16 NOTE — NURSING NOTE
Comprehensive Intake Entered On:  7/12/2021 12:35 PM CDT    Performed On:  7/12/2021 12:29 PM CDT by Qian ESPINAL, Ian               Summary   Chief Complaint :   c/o back pain/inflammation at L4-L5.  had x-rays at [facility] last Friday, were to be sent here.   Weight Measured :   159.9 lb(Converted to: 159 lb 14 oz, 72.529 kg)    Height Measured :   67 in(Converted to: 5 ft 7 in, 170.18 cm)    Body Mass Index :   25.04 kg/m2 (HI)    Body Surface Area :   1.85 m2   Systolic Blood Pressure :   130 mmHg   Diastolic Blood Pressure :   75 mmHg   Mean Arterial Pressure :   93 mmHg   Peripheral Pulse Rate :   73 bpm   BP Site :   Right arm   Pulse Site :   Radial artery   BP Method :   Electronic   HR Method :   Electronic   Temperature Tympanic :   97.4 DegF(Converted to: 36.3 DegC)  (LOW)    Oxygen Saturation :   97 %   Ina Laughlin MA - 7/12/2021 12:29 PM CDT   Health Status   Allergies Verified? :   Yes   Medication History Verified? :   Yes   Immunizations Current :   No   Medical History Verified? :   Yes   Pre-Visit Planning Status :   Not completed   Tobacco Use? :   Unknown if ever smoked   Ina Laughlin MA - 7/12/2021 12:29 PM CDT   Consents   Consent for Immunization Exchange :   Consent Granted   Consent for Immunizations to Providers :   Consent Granted   Ina Laughlin MA - 7/12/2021 12:29 PM CDT   Meds / Allergies   (As Of: 7/12/2021 12:35:55 PM CDT)   Allergies (Active)   No known allergies  Estimated Onset Date:   Unspecified ; Created By:   Gena Adan LPN; Reaction Status:   Active ; Category:   Drug ; Substance:   No known allergies ; Type:   Allergy ; Updated By:   Gena Adan LPN; Reviewed Date:   6/9/2021 9:53 AM CDT        Medication List   (As Of: 7/12/2021 12:35:55 PM CDT)   Prescription/Discharge Order    acetaminophen-oxycodone  :   acetaminophen-oxycodone ; Status:   Prescribed ; Ordered As Mnemonic:   Percocet 5/325 oral tablet ; Simple Display Line:   1 tab(s), Oral, q4 hrs,  PRN: for pain, 15 tab(s), 0 Refill(s) ; Ordering Provider:   Misael Crow PA-C; Catalog Code:   acetaminophen-oxycodone ; Order Dt/Tm:   6/30/2021 12:56:17 PM CDT          atropine-diphenoxylate  :   atropine-diphenoxylate ; Status:   Prescribed ; Ordered As Mnemonic:   Lomotil 2.5 mg-0.025 mg oral tablet ; Simple Display Line:   1 tab(s), po, tid, 90 tab(s), 5 Refill(s) ; Ordering Provider:   Kolton Majano MD; Catalog Code:   atropine-diphenoxylate ; Order Dt/Tm:   4/12/2021 3:11:49 PM CDT          bromocriptine  :   bromocriptine ; Status:   Prescribed ; Ordered As Mnemonic:   Cycloset 0.8 mg oral tablet ; Simple Display Line:   3 tab(s), Oral, qam, DOSE INCREASE 6/24/2020., 280 EA, 0 Refill(s) ; Ordering Provider:   Misael Crow PA-C; Catalog Code:   bromocriptine ; Order Dt/Tm:   6/30/2021 12:55:36 PM CDT          dutasteride  :   dutasteride ; Status:   Prescribed ; Ordered As Mnemonic:   dutasteride 0.5 mg oral capsule ; Simple Display Line:   1 cap(s), Oral, daily, 95 EA, 0 Refill(s) ; Ordering Provider:   Misael Crow PA-C; Catalog Code:   dutasteride ; Order Dt/Tm:   6/30/2021 12:57:08 PM CDT          ipratropium nasal  :   ipratropium nasal ; Status:   Prescribed ; Ordered As Mnemonic:   Atrovent 42 mcg/inh nasal spray ; Simple Display Line:   2 spray(s), nasal, qid, PRN: for nasal congestion, 1 EA, 2 Refill(s) ; Ordering Provider:   Misael Crow PA-C; Catalog Code:   ipratropium nasal ; Order Dt/Tm:   5/1/2018 10:02:36 AM CDT          lidocaine topical  :   lidocaine topical ; Status:   Prescribed ; Ordered As Mnemonic:   lidocaine 5% topical ointment ; Simple Display Line:   1 nini, top, tid, 35 gm, 3 Refill(s) ; Ordering Provider:   Kolton Majano MD; Catalog Code:   lidocaine topical ; Order Dt/Tm:   12/19/2019 3:27:49 PM CST          metFORMIN  :   metFORMIN ; Status:   Prescribed ; Ordered As Mnemonic:   metFORMIN 500 mg oral tablet, extended release ; Simple Display Line:   4 tab(s),  Oral, daily, 380 tab(s), 3 Refill(s) ; Ordering Provider:   Kolton Majano MD; Catalog Code:   metFORMIN ; Order Dt/Tm:   4/12/2021 4:04:12 PM CDT          modafinil  :   modafinil ; Status:   Prescribed ; Ordered As Mnemonic:   modafinil 200 mg oral tablet ; Simple Display Line:   200 mg, 1 tab(s), po, qam, [facility], 30 tab(s), 0 Refill(s) ; Ordering Provider:   Kolton Majano MD; Catalog Code:   modafinil ; Order Dt/Tm:   4/12/2021 3:11:11 PM CDT          sildenafil  :   sildenafil ; Status:   Prescribed ; Ordered As Mnemonic:   sildenafil 100 mg oral tablet ; Simple Display Line:   100 mg, 1 tab(s), Oral, daily, 1 hour before sexual activity, 30 tab(s), 5 Refill(s) ; Ordering Provider:   Kolton Majano MD; Catalog Code:   sildenafil ; Order Dt/Tm:   9/23/2020 10:54:21 AM CDT          triamcinolone topical  :   triamcinolone topical ; Status:   Prescribed ; Ordered As Mnemonic:   triamcinolone 0.1% topical cream ; Simple Display Line:   1 nini, TOP, bid, use for no more than 2wks at a time, 60 gm, 1 Refill(s) ; Ordering Provider:   Kolton Majano MD; Catalog Code:   triamcinolone topical ; Order Dt/Tm:   12/19/2019 3:36:42 PM CST          venlafaxine  :   venlafaxine ; Status:   Prescribed ; Ordered As Mnemonic:   venlafaxine 75 mg oral capsule, extended release ; Simple Display Line:   75 mg, 1 cap(s), PO, Daily, 95 cap(s), 1 Refill(s) ; Ordering Provider:   Kolton Majano MD; Catalog Code:   venlafaxine ; Order Dt/Tm:   6/30/2020 6:20:30 AM CDT            Home Meds    acetaminophen  :   acetaminophen ; Status:   Documented ; Ordered As Mnemonic:   acetaminophen ; Simple Display Line:   325 mg, po, q4 hrs, PRN: as needed for pain ; Catalog Code:   acetaminophen ; Order Dt/Tm:   7/1/2014 1:21:25 PM CDT          acetaminophen-oxycodone  :   acetaminophen-oxycodone ; Status:   Documented ; Ordered As Mnemonic:   acetaminophen-oxycodone 325 mg-5 mg oral tablet ; Simple Display Line:   1  tab(s), po, daily, PRN: as needed for pain ; Catalog Code:   acetaminophen-oxycodone ; Order Dt/Tm:   9/5/2012 2:11:41 PM CDT          biotin  :   biotin ; Status:   Documented ; Ordered As Mnemonic:   biotin 300 mcg oral tablet ; Simple Display Line:   300 mcg, 1 tab(s), po, daily ; Catalog Code:   biotin ; Order Dt/Tm:   7/1/2014 1:23:25 PM CDT          ibuprofen  :   ibuprofen ; Status:   Documented ; Ordered As Mnemonic:   ibuprofen 600 mg oral tablet ; Simple Display Line:   600 mg, 1 tab(s), po, daily, PRN: as needed for pain ; Catalog Code:   ibuprofen ; Order Dt/Tm:   9/5/2012 2:09:45 PM CDT          Miscellaneous Prescription  :   Miscellaneous Prescription ; Status:   Documented ; Ordered As Mnemonic:   Vitamin C ; Simple Display Line:   See Instructions, 1000mg tablet po bid ; Catalog Code:   Miscellaneous Prescription ; Order Dt/Tm:   7/1/2014 1:22:31 PM CDT            Social History   Social History   (As Of: 7/12/2021 12:35:55 PM CDT)   Alcohol:        Current, Beer (12 oz), 1-2 times per month, 2 drinks/episode average.   (Last Updated: 6/5/2014 6:36:23 PM CDT by Elsie Sellers CMA)          Tobacco:  Past      some day chewing   (Last Updated: 4/12/2021 2:32:13 PM CDT by Blanca Chowdhury CMA)          Electronic Cigarette/Vaping:        Electronic Cigarette Use: Never.   (Last Updated: 2/10/2021 3:10:53 PM CST by Alberto Palumbo CMA)          Substance Abuse:        Never   (Last Updated: 10/9/2012 9:09:03 AM CDT by Yaa Tillman)          Employment/School:        Retired, Work/School description: Lt. Col., [employer].   (Last Updated: 10/9/2012 9:10:02 AM CDT by Yaa Tillman)          Exercise:        Exercise frequency: 1-2 times/week.  Exercise type: Walking, Weight lifting.   (Last Updated: 10/9/2012 9:08:01 AM CDT by Yaa Tillman)          Sexual:        Sexually active: No.  Sexual orientation: Heterosexual.   (Last Updated: 10/9/2012 9:06:24 AM CDT by Yaa Tillman)

## 2022-02-16 NOTE — TELEPHONE ENCOUNTER
---------------------  From: Bety Neri CMA (eRx Pool ([id number]_[facility]))   To: [facility] Message Pool ([id number] [facility] [place]);     Sent: 12/3/2019 2:48:00 PM CST  Subject: FW: Medication Management   Due Date/Time: 12/4/2019 1:30:00 PM CST           ------------------------------------------  From: [facility]  To: Kolton Majano MD  Sent: December 3, 2019 1:30:10 PM CST  Subject: Medication Management  Due: December 4, 2019 1:30:10 PM CST    ** On Hold Pending Signature **  Drug: atropine-diphenoxylate (Lomotil 2.5 mg-0.025 mg oral tablet)  TAKE 1 TABLET BY MOUTH 3 TIMES A DAY  Quantity: 90 unknown unit  Days Supply: 0  Refills: 0  Substitutions Allowed  Notes from Pharmacy:     Dispensed Drug: atropine-diphenoxylate (atropine-diphenoxylate 0.025 mg-2.5 mg oral tablet)  TAKE 1 TABLET BY MOUTH 3 TIMES A DAY  Quantity: 90 unknown unit  Days Supply: 0  Refills: 0  Substitutions Allowed  Notes from Pharmacy:   ---------------------------------------------------------------  From: Martina Lin CMA ([facility] Message Pool ([id number]_[facility]))   To: Kolton Majano MD;     Sent: 12/3/2019 3:04:36 PM CST  Subject: FW: Medication Management   Due Date/Time: 12/4/2019 1:30:00 PM CST---------------------  From: Kolton Majano MD   To: Martina Lin CMA;     Sent: 12/3/2019 3:56:17 PM CST  Subject: RE: Medication Management     Px sent to pharmacy

## 2022-02-16 NOTE — PROGRESS NOTES
Patient:   NOY CRUZ            MRN: [id number]            FIN: [id number]               Age:   68 years     Sex:  Male     :  1950   Associated Diagnoses:   Chronic diarrhea; History of anemia; Diabetes Mellitus; Pruritus, Perianal   Author:   Kolton Majano MD      Visit Information      Date of Service: 2019 02:21 pm  Performing Location: [facility]  Encounter#: [id number]      Primary Care Provider (PCP):  Kolton Majano MD    NPI# [id number]      Referring Provider:  Kolton Majano MD    NPI# [id number]      Chief Complaint   2019 2:27 PM CST    Medication management        History of Present Illness   Started Bromocriptine 2017 for diabetes. No significant side effects.    Chewing rarely. Using nicorrette. Wants to quit and concerned that chewing could have contributed to the colon polyps. Chews copehagen 2-3 cans a week. Chewing for 30 years. Used an herbal supplement and gum. Using nicorette lozenges.    Had hemorrhoids surgically fixed . Continues to have pain and itching intermittently but less frequently. When has rectal pain 1x a week triamcinolone 0.1% cream and lidocaine helps.    Had loose stools 3-4x a day until starting cholestyramine. Now has 1-2 stools usually formed. Still taking lomotil 3 tabs a day. Loose stools started following the right hemicolectomy . Still has gallbladder.    Takes percocet about 8-10 tabs a month for various chronic pains. [facility]jia[facility]  Uses tylenol daily for lower back pain and neck pain    Diabetes diagnosed in .  Everything hurts when walks a mile. Most pain in left achilles since injury .  Weight is stable  Takes modafinil for sleep apnea and [facility] by [facility] when [facility]  Takes effexor  Prostate 8.5 in  with normal biopsies  [place] jump with injury in neck and has had chronic issues         Review of Systems   Constitutional:  No fever.    Respiratory:  No  shortness of breath.    Cardiovascular:  No chest pain.    Gastrointestinal:  No vomiting.    PHQ-9: 7pts (September 2017). 2pts (February 2016)  Eczema in the winter  CAGE: 0pts and minimal alcohol  No longer having black stools      Health Status   Allergies:    Allergic Reactions (Selected)  No known allergies   Medications:  (Selected)   Prescriptions  Prescribed  Atrovent 42 mcg/inh nasal spray: 2 spray(s), nasal, qid, PRN: for nasal congestion, # 1 EA, 2 Refill(s), Type: Maintenance, Pharmacy: [facility], 2 spray(s) nasal qid,PRN:for nasal congestion  Avodart 0.5 mg oral capsule: 1 cap(s) ( 0.5 mg ), PO, Daily, # 30 cap(s), 1 Refill(s), Type: Maintenance, Pharmacy: [facility], 1 cap(s) po daily  Lomotil 2.5 mg-0.025 mg oral tablet: 1-2 tab(s), po, qid, # 180 tab(s), 5 Refill(s), Type: Maintenance, Pharmacy: [facility], [place], 1-2 tab(s) Oral qid  Prevalite 4 g/5.5 g oral powder for reconstitution: ( 4 gm ), po, daily, # 90 EA, 3 Refill(s), Type: Maintenance, Pharmacy: [facility], 4 gm po daily  bromocriptine 0.8 mg oral tablet: 1 tab(s) ( 0.8 mg ), po, qam, # 95 tab(s), 3 Refill(s), Type: Maintenance, Pharmacy: [facility], 1 tab(s) po qam  lidocaine 5% topical ointment: 1 nini, top, tid, # 35 gm, 3 Refill(s), Type: Maintenance, Pharmacy: [facility], 1 nini top tid  metFORMIN 500 mg oral tablet, extended release: = 4 tab(s), Oral, daily, # 160 tab(s), 0 Refill(s), SHADE, Type: Maintenance, Pharmacy: [facility], Pt has appt on 2/21 will give more refills at that time  sildenafil 20 mg oral tablet: = 2.5 tab(s), Oral, daily, Instructions: 30-60 MINUTES PRIOR TO SEXUAL ACTIVITY., PRN: AS NEEDED, # 10 tab(s), 0 Refill(s), SHADE, Type: Maintenance, Pharmacy: [facility], Has appt 2/21 will give more refills at that time  triamcinolone 0.1% topical cream: 1 nini, TOP, bid, Instructions: use for no more than 2wks at a time, # 60 gm, 1 Refill(s), Type: Maintenance, Pharmacy: [facility], 1 nini top  bid,Instr:use for no more than 2wks at a time  Documented Medications  Documented  Vitamin C: Vitamin C, See Instructions, Instructions: 1000mg tablet po bid, Supply, 0 Refill(s), Type: Maintenance  acetaminophen-oxycodone 325 mg-5 mg oral tablet: 1 tab(s), po, daily, PRN: as needed for pain, 0 Refill(s), Type: Maintenance  acetaminophen: ( 325 mg ), po, q4 hrs, PRN: as needed for pain, 0 Refill(s), Type: Maintenance  biotin 300 mcg oral tablet: 1 tab(s) ( 300 mcg ), po, daily, 0 Refill(s), Type: Maintenance  ibuprofen 600 mg oral tablet: 1 tab(s) ( 600 mg ), po, daily, PRN: as needed for pain, 0 Refill(s), Type: Maintenance  modafinil 200 mg oral tablet: 1 tab(s) ( 200 mg ), po, qam, Instructions: PRN, 0 Refill(s), Type: Maintenance  venlafaxine 75 mg oral capsule, extended release: 1 cap(s) ( 75 mg ), PO, Daily, # 30 cap(s), 0 Refill(s), Type: Maintenance   Problem list:    All Problems  Colon Polyps / SNOMED CT 778028350 / Confirmed  BPH (benign prostatic hypertrophy) / SNOMED CT 588303918 / Confirmed  Diabetes mellitus / SNOMED CT 223373593 / Confirmed  Refusal of statin medication by patient / SNOMED CT 034151904 / Confirmed  Resolved: *Hospitalized@[facility] - Sessile serrated adenomas  Resolved: Tobacco user / ICD-9-.1      Histories   Procedure history:    Cardiac computed tomography for calcium scoring (SNOMED CT 1511199650) on 7/2/2018 at 67 Years.  Comments:  7/5/2018 9:26 AM CDT - Kailee Woodward  Total Agatston calcium score is 393  Colonoscopy (SNOMED CT 240033200) performed by Kolton Majano MD on 11/21/2017 at 67 Years.  Comments:  11/29/2017 3:20 PM CST - Larissa Romano  Sedation:  Fentanyl and Versed  Negative for microscopic colitis  F/u in 5 years  Right hemicolectomy (SNOMED CT 650393228) performed by Nitesh Whitehead MD on 6/26/2014 at 63 Years.  Colonoscopy (SNOMED CT 167701277) performed by Shira Christian MD on 1/27/2009 at 58 Years.  Biopsy of prostate x 3 (SNOMED CT 193524489) in 2008  at 58 Years.  Repair of tendon of upper limb (SNOMED CT 937726787) on 2002 at 51 Years.  Extraction of wisdom tooth (SNOMED CT 932426511) in  at 21 Years.     Family History: No colon cancer or polyps on Dad side but mom side unknown, Mom  age 93 from stroke/CHF. Dad  heart attack age 65 and had CABG in 50's. Brother is healthy  Social History: Retired [employer]  (33 years). Currently chewing 2-3 cans a week down from 4-5. Single (never ) and no children      Physical Examination   Vital Signs   2019 2:27 PM CST Temperature Tympanic 97.6 DegF  LOW    Peripheral Pulse Rate 76 bpm    Pulse Site Radial artery    HR Method Manual    Systolic Blood Pressure 120 mmHg    Diastolic Blood Pressure 72 mmHg    Mean Arterial Pressure 88 mmHg    BP Site Right arm    BP Method Manual      Measurements from flowsheet : Measurements   2019 2:27 PM CST Height Measured - Standard 67 in    Weight Measured - Standard 157 lb    BSA 1.83 m2    Body Mass Index 24.59 kg/m2      General:  Alert and oriented, No acute distress.    Neck:  No lymphadenopathy.    Respiratory:  Lungs are clear to auscultation.    Cardiovascular:  Normal rate, Regular rhythm, No edema.    Gastrointestinal:  Soft, Non-tender, Non-distended.    Musculoskeletal:  Normal gait.    Neurologic: normal monofilament testing (2019)      Review / Management   Cardiac Calcium score (2018): 393pts with 77th percentile      Results review:  Lab results   2019 8:09 AM CST Hgb A1c 7.8  HI    Cholesterol 119 mg/dL    Iron Saturation 36    PSA 0.7 ng/mL    WBC 5.0    Hgb 13.8 gm/dL    Platelet 285   .       Impression and Plan   Diagnosis     Chronic diarrhea (LWP58-RI K52.9).     History of anemia (CAD44-EI Z86.2).     Diabetes Mellitus (MPX21-SY E11.9).     Pruritus, Perianal (IHV85-CJ L29.0).       Diabetes: improvement with addition of bromocriptine but now HgbA1c 7.8% (2019). Continue metformin 2000mg daily and  bromocriptine and recheck in May 2019. Discussed and declines statin given diabetes even with low LDL (46). Taking aspirin.    Anal pruritis: lidocaine has worked well. Uses 1-2x a week  Diarrhea: continue lomotil 3x daily (February 2019)  Anemia: has corrected with normal iron stores. Will continue to monitor CBC  Colon cancer screening: had right hemicolectomy for large [id number]. Normal colonoscopy 2009. Two colonoscopy prior to 2009. Normal colonoscopy November 2017 and repeat in 2022  Asbestosis exposure: [place] in the . CXR 2017 negative and repeat 2020  Eczema in the winter and triamcinolone helps  PSA 0.70-1.42 since 2012 and normal this month  Refilled viagra (declines STD testing), lomotil, metformin, bromocriptine    Reviewed [place] [facility] Monitoring Program (February 2019)

## 2022-02-16 NOTE — TELEPHONE ENCOUNTER
---------------------  From: Keisha Jolly   To: Radha Boateng MD;     Sent: 2/14/2021 7:50:14 AM CST  Subject: Result: COVID-19 (POSITIVE)     Spoke with patient at 7:48am regarding COVID-19 test.  Result is POSITIVE.  Patient is feeling a bit better, will continue to isolate until he hears from the department of health. He has no questions or concerns.noted.  thank you!

## 2022-02-16 NOTE — NURSING NOTE
Comprehensive Intake Entered On:  7/8/2019 5:04 PM CDT    Performed On:  7/8/2019 4:57 PM CDT by Qian ESPINAL, Ina               Summary   Chief Complaint :    wants referral to dermatologist for left great toenail.  was seen by Dr. Majano 6/6/19.   Weight Measured :   159.2 lb(Converted to: 159 lb 3 oz, 72.21 kg)    Height Measured :   67 in(Converted to: 5 ft 7 in, 170.18 cm)    Body Mass Index :   24.93 kg/m2   Body Surface Area :   1.85 m2   Systolic Blood Pressure :   106 mmHg   Diastolic Blood Pressure :   64 mmHg   Mean Arterial Pressure :   78 mmHg   Peripheral Pulse Rate :   76 bpm   BP Site :   Right arm   Pulse Site :   Radial artery   BP Method :   Manual   HR Method :   Manual   Temperature Tympanic :   97.6 DegF(Converted to: 36.4 DegC)  (LOW)    Ina Laughlin MA - 7/8/2019 4:57 PM CDT   Health Status   Allergies Verified? :   Yes   Medication History Verified? :   Yes   Immunizations Current :   No   Medical History Verified? :   Yes   Pre-Visit Planning Status :   Completed   Tobacco Use? :   Current every day smoker   Ina Laughlin MA - 7/8/2019 4:57 PM CDT   Consents   Consent for Immunization Exchange :   Consent Granted   Consent for Immunizations to Providers :   Consent Granted   Ina Laughlin MA - 7/8/2019 4:57 PM CDT   Meds / Allergies   (As Of: 7/8/2019 5:04:54 PM CDT)   Allergies (Active)   No known allergies  Estimated Onset Date:   Unspecified ; Created By:   Gena Adan LPN; Reaction Status:   Active ; Category:   Drug ; Substance:   No known allergies ; Type:   Allergy ; Updated By:   Gena Adan LPN; Reviewed Date:   6/6/2019 2:58 PM CDT        Medication List   (As Of: 7/8/2019 5:04:54 PM CDT)   Prescription/Discharge Order    atropine-diphenoxylate  :   atropine-diphenoxylate ; Status:   Prescribed ; Ordered As Mnemonic:   Lomotil 2.5 mg-0.025 mg oral tablet ; Simple Display Line:   1 tab(s), po, tid, 90 tab(s), 5 Refill(s) ; Ordering Provider:   Kolton Majano MD;  Catalog Code:   atropine-diphenoxylate ; Order Dt/Tm:   6/6/2019 3:13:02 PM          bromocriptine  :   bromocriptine ; Status:   Prescribed ; Ordered As Mnemonic:   bromocriptine 0.8 mg oral tablet ; Simple Display Line:   0.8 mg, 1 tab(s), po, qam, 95 tab(s), 3 Refill(s) ; Ordering Provider:   Kolton Majano MD; Catalog Code:   bromocriptine ; Order Dt/Tm:   2/21/2019 3:08:16 PM          cholestyramine  :   cholestyramine ; Status:   Prescribed ; Ordered As Mnemonic:   Prevalite 4 g/5.5 g oral powder for reconstitution ; Simple Display Line:   4 gm, po, daily, 90 EA, 3 Refill(s) ; Ordering Provider:   Kolton Majano MD; Catalog Code:   cholestyramine ; Order Dt/Tm:   9/18/2017 4:44:38 PM          dutasteride  :   dutasteride ; Status:   Prescribed ; Ordered As Mnemonic:   Avodart 0.5 mg oral capsule ; Simple Display Line:   0.5 mg, 1 cap(s), PO, Daily, 30 cap(s), 1 Refill(s) ; Ordering Provider:   Kolton Majano MD; Catalog Code:   dutasteride ; Order Dt/Tm:   8/30/2017 7:29:29 PM          ipratropium nasal  :   ipratropium nasal ; Status:   Prescribed ; Ordered As Mnemonic:   Atrovent 42 mcg/inh nasal spray ; Simple Display Line:   2 spray(s), nasal, qid, PRN: for nasal congestion, 1 EA, 2 Refill(s) ; Ordering Provider:   Misael Crow PA-C; Catalog Code:   ipratropium nasal ; Order Dt/Tm:   5/1/2018 10:02:36 AM          lidocaine topical  :   lidocaine topical ; Status:   Prescribed ; Ordered As Mnemonic:   lidocaine 5% topical ointment ; Simple Display Line:   1 nini, top, tid, 35 gm, 3 Refill(s) ; Ordering Provider:   Kolton Majano MD; Catalog Code:   lidocaine topical ; Order Dt/Tm:   2/21/2019 3:12:20 PM          metFORMIN  :   metFORMIN ; Status:   Prescribed ; Ordered As Mnemonic:   metFORMIN 500 mg oral tablet, extended release ; Simple Display Line:   4 tab(s), Oral, daily, 380 tab(s), 3 Refill(s) ; Ordering Provider:   Kolton Majano MD; Catalog Code:   metFORMIN ; Order Dt/Tm:   2/21/2019  3:07:51 PM          sildenafil  :   sildenafil ; Status:   Prescribed ; Ordered As Mnemonic:   sildenafil 20 mg oral tablet ; Simple Display Line:   2.5 tab(s), Oral, daily, 30-60 MINUTES PRIOR TO SEXUAL ACTIVITY., PRN: AS NEEDED, 10 tab(s), 10 Refill(s) ; Ordering Provider:   Kolton Majano MD; Catalog Code:   sildenafil ; Order Dt/Tm:   2/21/2019 3:07:32 PM          triamcinolone topical  :   triamcinolone topical ; Status:   Prescribed ; Ordered As Mnemonic:   triamcinolone 0.1% topical cream ; Simple Display Line:   1 nini, TOP, bid, use for no more than 2wks at a time, 60 gm, 1 Refill(s) ; Ordering Provider:   Kolton Majano MD; Catalog Code:   triamcinolone topical ; Order Dt/Tm:   9/18/2017 4:47:11 PM            Home Meds    acetaminophen  :   acetaminophen ; Status:   Documented ; Ordered As Mnemonic:   acetaminophen ; Simple Display Line:   325 mg, po, q4 hrs, PRN: as needed for pain ; Catalog Code:   acetaminophen ; Order Dt/Tm:   7/1/2014 1:21:25 PM          acetaminophen-oxycodone  :   acetaminophen-oxycodone ; Status:   Documented ; Ordered As Mnemonic:   acetaminophen-oxycodone 325 mg-5 mg oral tablet ; Simple Display Line:   1 tab(s), po, daily, PRN: as needed for pain ; Catalog Code:   acetaminophen-oxycodone ; Order Dt/Tm:   9/5/2012 2:11:41 PM          biotin  :   biotin ; Status:   Documented ; Ordered As Mnemonic:   biotin 300 mcg oral tablet ; Simple Display Line:   300 mcg, 1 tab(s), po, daily ; Catalog Code:   biotin ; Order Dt/Tm:   7/1/2014 1:23:25 PM          ibuprofen  :   ibuprofen ; Status:   Documented ; Ordered As Mnemonic:   ibuprofen 600 mg oral tablet ; Simple Display Line:   600 mg, 1 tab(s), po, daily, PRN: as needed for pain ; Catalog Code:   ibuprofen ; Order Dt/Tm:   9/5/2012 2:09:45 PM          Miscellaneous Prescription  :   Miscellaneous Prescription ; Status:   Documented ; Ordered As Mnemonic:   Vitamin C ; Simple Display Line:   See Instructions, 1000mg tablet po bid ;  Catalog Code:   Miscellaneous Prescription ; Order Dt/Tm:   7/1/2014 1:22:31 PM          modafinil  :   modafinil ; Status:   Documented ; Ordered As Mnemonic:   modafinil 200 mg oral tablet ; Simple Display Line:   200 mg, 1 tab(s), po, qam, PRN ; Catalog Code:   modafinil ; Order Dt/Tm:   2/6/2014 5:07:37 PM          venlafaxine  :   venlafaxine ; Status:   Documented ; Ordered As Mnemonic:   venlafaxine 75 mg oral capsule, extended release ; Simple Display Line:   75 mg, 1 cap(s), PO, Daily, 30 cap(s) ; Catalog Code:   venlafaxine ; Order Dt/Tm:   9/5/2012 2:09:18 PM            Social History   Social History   (As Of: 7/8/2019 5:04:54 PM CDT)   Alcohol:        Current, Beer (12 oz), 1-2 times per month, 2 drinks/episode average.   (Last Updated: 6/5/2014 6:36:23 PM CDT by Elsie Sellers CMA)          Tobacco:        Current, Snuff - 1/2 can per day, 26 year(s).   (Last Updated: 7/1/2014 1:00:11 PM CDT by Elsie Sellers CMA)          Substance Abuse:        Never   (Last Updated: 10/9/2012 9:09:03 AM CDT by Yaa Tillman)          Employment/School:        Retired, Work/School description: Lt. Col., [employer].   (Last Updated: 10/9/2012 9:10:02 AM CDT by Yaa Tillman)          Exercise:        Exercise frequency: 1-2 times/week.  Exercise type: Walking, Weight lifting.   (Last Updated: 10/9/2012 9:08:01 AM CDT by Yaa Tillman)          Sexual:        Sexually active: No.  Sexual orientation: Heterosexual.   (Last Updated: 10/9/2012 9:06:24 AM CDT by Yaa Tillman)

## 2022-02-16 NOTE — PROGRESS NOTES
Patient:   NOY CRUZ            MRN: [id number]            FIN: [id number]               Age:   67 years     Sex:  Male     :  1950   Associated Diagnoses:   Acute maxillary sinusitis; Bronchitis   Author:   Misael Crow PA-C      Chief Complaint   2018 9:31 AM CDT     Pt here for productive cough and sinus pressure x 2 weeks.      History of Present Illness   Chief complaint and symptoms noted above and confirmed with patient   as above, cough, sinus pressure, fatigue  using sudafed      Review of Systems   Constitutional:  No fever.    Ear/Nose/Mouth/Throat:  Nasal congestion, sinus pressure.    Respiratory:  Cough, Sputum production.       Health Status   Allergies:    Allergic Reactions (Selected)  No known allergies   Medications:  (Selected)   Prescriptions  Prescribed  Avodart 0.5 mg oral capsule: 1 cap(s) ( 0.5 mg ), PO, Daily, # 30 cap(s), 1 Refill(s), Type: Maintenance, Pharmacy: [facility], 1 cap(s) po daily  Lomotil 2.5 mg-0.025 mg oral tablet: 1-2 tab(s), po, qid, # 180 tab(s), 5 Refill(s), Type: Maintenance, Pharmacy: [facility], [place], 1-2 tab(s) po qid  Prevalite 4 g/5.5 g oral powder for reconstitution: ( 4 gm ), po, daily, # 90 EA, 3 Refill(s), Type: Maintenance, Pharmacy: [facility], 4 gm po daily  Viagra 100 mg oral tablet: 0.5 tab(s) ( 50 mg ), po, daily, Instructions: take when needed., # 10 tab(s), 3 Refill(s), Type: Maintenance, Pharmacy: [facility], 0.5 tab(s) po daily,Instr:take when needed.  bromocriptine 0.8 mg oral tablet: 1 tab(s) ( 0.8 mg ), po, qam, # 95 tab(s), 3 Refill(s), Type: Maintenance, Pharmacy: [facility], 1 tab(s) po qam  lidocaine 5% topical ointment: 1 nini, top, tid, # 35 gm, 3 Refill(s), Type: Maintenance, Pharmacy: [facility], 1 nini top tid  metFORMIN 500 mg oral tablet, extended release: 4 tab(s) ( 2,000 mg ), po, daily, # 370 tab(s), 3 Refill(s), Type: Maintenance, Pharmacy: [facility], Pt is overdue for DM check and  labs--must be seen for further refills., 4 tab(s) po daily  triamcinolone 0.1% topical cream: 1 nini, TOP, bid, Instructions: use for no more than 2wks at a time, # 60 gm, 1 Refill(s), Type: Maintenance, Pharmacy: [facility], 1 nini top bid,Instr:use for no more than 2wks at a time  Documented Medications  Documented  Vitamin C: Vitamin C, See Instructions, Instructions: 1000mg tablet po bid, Supply, 0 Refill(s), Type: Maintenance  acetaminophen-oxycodone 325 mg-5 mg oral tablet: 1 tab(s), po, daily, PRN: as needed for pain, 0 Refill(s), Type: Maintenance  acetaminophen: ( 325 mg ), po, q4 hrs, PRN: as needed for pain, 0 Refill(s), Type: Maintenance  biotin 300 mcg oral tablet: 1 tab(s) ( 300 mcg ), po, daily, 0 Refill(s), Type: Maintenance  ibuprofen 600 mg oral tablet: 1 tab(s) ( 600 mg ), po, daily, PRN: as needed for pain, 0 Refill(s), Type: Maintenance  modafinil 200 mg oral tablet: 1 tab(s) ( 200 mg ), po, qam, Instructions: PRN, 0 Refill(s), Type: Maintenance  venlafaxine 75 mg oral capsule, extended release: 1 cap(s) ( 75 mg ), PO, Daily, # 30 cap(s), 0 Refill(s), Type: Maintenance   Problem list:    All Problems  Diabetes mellitus / SNOMED CT 129369724 / Confirmed  Colon Polyps / SNOMED CT 301160538 / Confirmed  Refusal of statin medication by patient / SNOMED CT 270977519 / Confirmed  BPH (benign prostatic hypertrophy) / SNOMED CT 827705517 / Confirmed  Resolved: *Hospitalized@[facility] - Sessile serrated adenomas  Resolved: Tobacco user / ICD-9-.1      Histories   Past Medical History:    Active  Diabetes mellitus (092005840)  BPH (benign prostatic hypertrophy) (742124160)  Resolved  *Hospitalized@[facility] - Sessile serrated adenomas: Onset on 2014 at 63 years.  Resolved on 2014 at 63 years.  Tobacco user (305.1):  Resolved.   Family History:    Heart failure  Mother (Aretha, )  Stroke  Mother (Aretha, )  CABG - Coronary artery bypass graft  Father (Justin, )     Procedure history:     Colonoscopy ([id number]) on 11/21/2017 at 67 Years.  Comments:  11/29/2017 3:20 PM - Larissa Romano  Sedation:  Fentanyl and Versed  Negative for microscopic colitis  F/u in 5 years  Right hemicolectomy (826268029) on 6/26/2014 at 63 Years.  Colonoscopy ([id number]) on 1/27/2009 at 58 Years.  Biopsy of prostate x 3 ([id number]) in 2008 at 58 Years.  Repair of tendon of upper limb ([id number]) on 2/13/2002 at 51 Years.  Extraction of wisdom tooth ([id number]) in 1971 at 21 Years.   Social History:        Alcohol Assessment            Current, Beer (12 oz), 1-2 times per month, 2 drinks/episode average.      Tobacco Assessment            Current, Snuff - 1/2 can per day, 25 year(s).      Substance Abuse Assessment            Never      Employment and Education Assessment            Retired, Work/School description: Lt. Col., [employer].      Exercise and Physical Activity Assessment            Exercise frequency: 1-2 times/week.  Exercise type: Walking, Weight lifting.      Sexual Assessment            Sexually active: No.  Sexual orientation: Heterosexual.        Physical Examination   Vital Signs   5/1/2018 9:31 AM CDT Temperature Tympanic 97.2 DegF  LOW    Peripheral Pulse Rate 100 bpm    Pulse Site Radial artery    Respiratory Rate 16 br/min    Systolic Blood Pressure 126 mmHg    Diastolic Blood Pressure 68 mmHg    Mean Arterial Pressure 87 mmHg    BP Site Right arm    Oxygen Saturation 97 %      Measurements from flowsheet : Measurements   5/1/2018 9:31 AM CDT Height Measured - Standard 67 in    Weight Measured - Standard 154 lb    BSA 1.82 m2    Body Mass Index 24.12 kg/m2      General:  No acute distress.    HENT:  Tympanic membranes are clear, No pharyngeal erythema, No sinus tenderness, nares are patent.    Neck:  Supple, Non-tender, No lymphadenopathy.    Respiratory:  lungs have coarse ronchi bilaterally, no wheezes, no rales.    Cardiovascular:  Normal rate, Regular rhythm, No murmur.       Impression and  Plan   Diagnosis     Acute maxillary sinusitis (WQX80-GB J01.00).     Bronchitis (SZZ99-ZD J40).     Patient Instructions:   Encouraged to use heat over the sinuses, salt water nasal spray, decongestants and expectorants, NSAIDs.  Follow up if not improving.    Summary:  will treat with zpak and atrovent, continue with sudafed.    Orders     Orders   Pharmacy:  Atrovent 42 mcg/inh nasal spray (Prescribe): 2 spray(s), nasal, qid, PRN: for nasal congestion, # 1 EA, 2 Refill(s), Type: Maintenance, Pharmacy: [facility], 2 spray(s) nasal qid,PRN:for nasal congestion  Zithromax Z-Reggie 250 mg oral tablet (Prescribe): Take 2 tablets on Day 1 and then 1 tablet, PO, Daily, Instructions: until finished, # 6 tab(s), 0 Refill(s), Type: Maintenance, Pharmacy: [facility], Take 2 tablets on Day 1 and then 1 tablet po daily,Instr:until finished.     Orders   Charges (Evaluation and Management):  [id number] office outpatient visit 15 minutes (Charge) (Order): Quantity: 1, Acute maxillary sinusitis  Bronchitis.

## 2022-02-16 NOTE — PROGRESS NOTES
Patient:   NOY CRUZ            MRN: [id number]            FIN: [id number]               Age:   69 years     Sex:  Male     :  1950   Associated Diagnoses:   Impacted cerumen, left ear; Otitis externa   Author:   Misael Crow PA-C      Chief Complaint   9/3/2020 10:23 AM CDT    Pt here for itchy and plugged ears x 4 days      History of Present Illness   4 day hx of itchy and plugged ears, no pain      Review of Systems   Constitutional:  Negative.    Ear/Nose/Mouth/Throat:  No ear pain     Decreased hearing: Bilaterally.    Respiratory:  Negative.       Health Status   Allergies:    Allergic Reactions (Selected)  No known allergies   Medications:  (Selected)   Prescriptions  Prescribed  Atrovent 42 mcg/inh nasal spray: 2 spray(s), nasal, qid, PRN: for nasal congestion, # 1 EA, 2 Refill(s), Type: Maintenance, Pharmacy: [facility], 2 spray(s) nasal qid,PRN:for nasal congestion  Avodart 0.5 mg oral capsule: = 1 cap(s) ( 0.5 mg ), PO, Daily, # 95 cap(s), 3 Refill(s), Type: Maintenance, Pharmacy: [facility], 1 cap(s) Oral daily, 67, in, 20 14:54:00 CDT, Height Measured, 161, lb, 20 14:54:00 CDT, Weight Measured  Lomotil 2.5 mg-0.025 mg oral tablet: 1 tab(s), po, tid, # 90 tab(s), 5 Refill(s), Type: Maintenance, Pharmacy: [facility], [place], 1 tab(s) Oral tid, 67, in, 19 15:02:00 CST, Height Measured, 162, lb, 19 15:02:00 CST, Weight Measured  bromocriptine 0.8 mg oral tablet: = 3 tab(s) ( 2.4 mg ), po, qam, # 280 tab(s), 3 Refill(s), Type: Maintenance, Pharmacy: [facility], 3 tab(s) Oral qam, 67, in, 20 14:54:00 CDT, Height Measured, 161, lb, 20 14:54:00 CDT, Weight Measured  lidocaine 5% topical ointment: 1 nini, top, tid, # 35 gm, 3 Refill(s), Type: Maintenance, Pharmacy: [facility], 1 nini Topical tid  metFORMIN 500 mg oral tablet, extended release: = 4 tab(s), Oral, daily, # 380 tab(s), 3 Refill(s), SHADE, Type: Maintenance, Pharmacy: [facility], Pt has  appt on 2/21 will give more refills at that time, 4 tab(s) Oral daily, 67, in, 06/24/20 14:54:00 CDT, Height Measured, 161, lb, 06/24/20 14:54:0...  sildenafil 20 mg oral tablet: = 2.5 tab(s), Oral, daily, Instructions: 30-60 MINUTES PRIOR TO SEXUAL ACTIVITY., PRN: AS NEEDED, # 10 tab(s), 10 Refill(s), SHADE, Type: Maintenance, Pharmacy: [facility], Has appt 2/21 will give more refills at that time, 2.5 tab(s) Oral daily,PRN:A...  triamcinolone 0.1% topical cream: 1 nini, TOP, bid, Instructions: use for no more than 2wks at a time, # 60 gm, 1 Refill(s), Type: Maintenance, Pharmacy: [facility], 1 nini Topical bid,Instr:use for no more than 2wks at a time  venlafaxine 75 mg oral capsule, extended release: = 1 cap(s) ( 75 mg ), PO, Daily, # 95 cap(s), 1 Refill(s), Type: Maintenance, Pharmacy: [facility], 1 cap(s) Oral daily, 67, in, 06/24/20 14:54:00 CDT, Height Measured, 161, lb, 06/24/20 14:54:00 CDT, Weight Measured  Documented Medications  Documented  Vitamin C: Vitamin C, See Instructions, Instructions: 1000mg tablet po bid, Supply, 0 Refill(s), Type: Maintenance  acetaminophen-oxycodone 325 mg-5 mg oral tablet: 1 tab(s), po, daily, PRN: as needed for pain, 0 Refill(s), Type: Maintenance  acetaminophen: ( 325 mg ), po, q4 hrs, PRN: as needed for pain, 0 Refill(s), Type: Maintenance  biotin 300 mcg oral tablet: 1 tab(s) ( 300 mcg ), po, daily, 0 Refill(s), Type: Maintenance  ibuprofen 600 mg oral tablet: 1 tab(s) ( 600 mg ), po, daily, PRN: as needed for pain, 0 Refill(s), Type: Maintenance  modafinil 200 mg oral tablet: 1 tab(s) ( 200 mg ), po, qam, Instructions: PRN, 0 Refill(s), Type: Maintenance   Problem list:    All Problems (Selected)  Diabetes mellitus / SNOMED CT 141858457 / Confirmed  Colon Polyps / SNOMED CT 410061447 / Confirmed  Refusal of statin medication by patient / SNOMED CT 434712554 / Confirmed  BPH (benign prostatic hypertrophy) / SNOMED CT 150864949 / Confirmed      Histories   Past Medical  History:    Active  Diabetes mellitus (933574818)  BPH (benign prostatic hypertrophy) (208760632)  Resolved  *Hospitalized@[facility] - Sessile serrated adenomas: Onset on 2014 at 63 years.  Resolved on 2014 at 63 years.  Tobacco user (305.1):  Resolved.   Family History:    Heart failure  Mother (Aretha, )  Stroke  Mother (Aretha, )  CABG - Coronary artery bypass graft  Father (Justin, )     Procedure history:    Cardiac computed tomography for calcium scoring (1841551344) on 2018 at 67 Years.  Comments:  2018 9:26 AM CDT - Kailee Woodward  Total Agatston calcium score is 393  Colonoscopy (042325006) on 2017 at 67 Years.  Comments:  2017 3:20 PM CST - Larissa Romano  Sedation:  Fentanyl and Versed  Negative for microscopic colitis  F/u in 5 years  Right hemicolectomy (675158232) on 2014 at 63 Years.  Colonoscopy (560879941) on 2009 at 58 Years.  Biopsy of prostate x 3 (432773695) in  at 58 Years.  Repair of tendon of upper limb (445685173) on 2002 at 51 Years.  Extraction of wisdom tooth (356783296) in  at 21 Years.      Physical Examination   Vital Signs   9/3/2020 10:23 AM CDT Temperature Tympanic 97.4 DegF  LOW    Peripheral Pulse Rate 72 bpm    Pulse Site Radial artery    Respiratory Rate 16 br/min    Systolic Blood Pressure 122 mmHg    Diastolic Blood Pressure 76 mmHg    Mean Arterial Pressure 91 mmHg    BP Site Right arm      General:  No acute distress.    HENT:  Right TM and canal are normal,  left TM could not be visualized due to blockage of canal,  canal is flushed with water and alcohol, a retained piece of a Q-tip is removed,  afterwards TM is normal, there is slight inflammation of the canal.       Impression and Plan   Diagnosis     Impacted cerumen, left ear (DSQ39-SC H61.22).     Otitis externa (XNN39-ZU H60.90).     Summary:  will treat with cortisporin suspension to help relieve any inflammation of his ear canals.    Orders      Orders   Pharmacy:  hydrocortisone/neomycin/polymyxin B 1%-0.35%-10,000 units/mL otic suspension (Prescribe): 4 drop(s), Ear-Both, tid, x 7 day(s), # 10 mL, 0 Refill(s), Type: Acute, Pharmacy: [facility], 4 drop(s) Ear-Both tid,x7 day(s), 67, in, 9/3/2020 10:23 AM CDT, Height Measured, 160, lb, 9/3/2020 10:23 AM CDT, Weight Measured.     Orders   Charges (Evaluation and Management):  [id number] office outpatient visit 15 minutes (Charge) (Order): Quantity: 1, Impacted cerumen, left ear  Otitis externa.

## 2022-02-16 NOTE — TELEPHONE ENCOUNTER
---------------------  From: Bety Neri CMA (eRx Pool ([id number]_[facility]))   To: [facility] Message Pool ([id number]_[place] [facility] [place]);     Sent: 9/7/2021 1:22:42 PM CDT  Subject: FW: Medication Management   Due Date/Time: 9/8/2021 9:49:00 AM CDT     Last visit: back pain 7/19/21  Last filled: 6/30/21 #95, 0       ------------------------------------------  From: [employer]  To: Kolton Majano MD  Sent: September 7, 2021 9:49:08 AM CDT  Subject: Medication Management  Due: August 20, 2021 11:17:32 AM CDT     ** On Hold Pending Signature **     Drug: dutasteride (dutasteride 0.5 mg oral capsule), TAKE ONE CAPSULE BY MOUTH ONCE DAILY  Quantity: 95 cap(s)  Days Supply: 95  Refills: 0  Substitutions Allowed  Notes from Pharmacy:     Dispensed Drug: dutasteride (dutasteride 0.5 mg oral capsule), TAKE ONE CAPSULE BY MOUTH ONCE DAILY  Quantity: 95 cap(s)  Days Supply: 95  Refills: 0  Substitutions Allowed  Notes from Pharmacy:  ---------------------------------------------------------------  From: Elsie Sellers CMA ([facility] Message Pool ([id number]_[facility]))   To: Kolton Majano MD;     Sent: 9/7/2021 1:44:38 PM CDT  Subject: FW: Medication Management   Due Date/Time: 9/8/2021 9:49:00 AM CDT     Lab appt sched for 9/23.---------------------  From: Kolton Majano MD   To: Elsie Sellers CMA;     Sent: 9/7/2021 2:04:24 PM CDT  Subject: RE: Medication Management     Sent in a 3 month supply.   Have him follow up with me after the lab test

## 2022-02-16 NOTE — TELEPHONE ENCOUNTER
---------------------  From: Marilynn Parekh LPN (Phone Messages Pool (32224_[facility]))   To: [facility] Message Pool ([address]224_[place] [facility] [place]);     Sent: 11/5/2021 3:21:15 PM CDT  Subject: modafinil     Phone Message    PCP:   ASHVIN      Time of Call:  3:09       Person Calling:  pt  Phone number:  [phone]    Note:   Pt calling stating he forgot to mention to [facility] that modafinil is no longer working for him. Pt says it has been putting him to sleep.    Pt asking if there is something else that he can try and Rx can be sent to [facility].    Last office visit and reason:  11/2/21 Medication Management---------------------  From: Blanca Chowdhury CMA ([facility] Message Pool ([id number]_[facility]))   To: Kolton Majano MD;     Sent: 11/5/2021 3:34:06 PM CDT  Subject: FW: modafinil---------------------  From: Kolton Majano MD   To: Blanca Chowdhury CMA;     Sent: 11/8/2021 7:05:30 AM CST  Subject: RE: modafinil     Left him a message recommending he make a consult with Dr Moore or Dr Ivory

## 2022-02-16 NOTE — LETTER
(Inserted Image. Unable to display)   December 17, 2019      NOY CRUZ      [address] [place], [place] [id number]        Dear NOY,    Thank you for selecting [facility] for your healthcare needs.  Below you will find the results of your recent tests done at our clinic.     Your diabetes test (HgbA1c) is 7.4% with goal of < 8.0%      Result Name Current Result Previous Result Reference Range   Hgb A1c ((H)) 7.4 12/16/2019 ((H)) 6.9 6/3/2019  ((H)) 7.8 2/18/2019  - <5.7       Please contact me or my assistant at [phone] if you have any questions about your results.     Sincerely,        Kolton Majano MD        What do your labs mean?  Below is a glossary of commonly ordered labs:  LDL   Bad Cholesterol   HDL   Good Cholesterol  AST/ALT   Liver Function   Cr/Creatinine   Kidney Function  Microalbumin   Kidney Function  BUN   Kidney Function  PSA   Prostate    TSH   Thyroid Hormone  HgbA1c   Diabetes Test   Hgb (Hemoglobin)   Red Blood Cells

## 2022-02-16 NOTE — LETTER
(Inserted Image. Unable to display)   [address] [place] [facility] [place] [address]  August 14, 2021      NOY CRUZ  [address] [place] [address]        Dear NOY,      Thank you for selecting [facility] for your healthcare needs.     DEXA scan:    FINDINGS:     Lumbar Spine: L1-L4: BMD: 1.537 g/cm2. T-score: 2.6. Z-score: 8.2  RIGHT Hip Total: BMD: 1.083 g/cm2. T-score: 0.1. Z-score: 0.6  RIGHT Hip Femoral neck: BMD: 0.943 g/cm2. T-score: -1.0. Z-score: 0.2  LEFT Hip Total: 1.050 g/cm2. T-score: -0.4. Z-score: 0.3  LEFT Hip Femoral neck: 0.920 g/cm2. T-score: -1.2. Z-score: 0.1     WHO Criteria:  Normal: T score at or above -1 SD  Osteopenia: T score between -1 and -2.5 SD  Osteoporosis: T score at or below -2.5 SD     COMPARISON: None available.      FRAX Results: 10 year probability of major osteoporotic fracture is 8.2%, and of hip fracture is 1.7%, based on left femoral neck BMD.     RECOMMENDATIONS:   Consider treatment if major osteoporotic fracture score is greater than or equal to 20%. Consider treatment if hip fracture score is greater than or equal to 3%.     IMPRESSION: Mild osteopenia or mildly low bone density of the left femoral neck with T score of -1.2.    Please make an appointment to discuss          Please contact me or my assistant at [phone] if you have any questions or concerns.     Sincerely,        Bay Majano MD

## 2022-02-16 NOTE — LETTER
(Inserted Image. Unable to display)   August 07, 2019        NOY CRUZ  [address]  [place], [place] [id number]        Dear NOY,      Thank you for selecting [facility] ([facility] for your healthcare needs.    Our records indicate you are due for the following services:    Non-Fasting Labs    If you had your labs done at another facility or with Direct Access Lab Testing at [facility], please bring in a copy of the results to your next visit, mail a copy, or drop off a copy of your results to your Healthcare Provider.  Diabetic Exam ~ Please bring your glucose meter and/or your blood glucose diary to your appointment.    You are due for lab work and an office visit, please schedule the lab appointment 1 week before the office visit.  This will assure all results are available to discuss with your provider during your visit.    **It is very helpful if you bring your medication bottles to your appointment.  This assures we have all of your current medications, including strength and dosing information, documented accurately in your medical record.    To schedule an appointment or if you have further questions, please contact your primary clinic:   [facility]       [phone]   [facility]       [phone]              [facility]     [phone]      Powered by [employer]    Sincerely,    Kolton Majano M.D.

## 2022-02-16 NOTE — TELEPHONE ENCOUNTER
Entered by Bety Neri CMA on January 07, 2021 2:37:41 PM CST  ---------------------  From: Bety Neri CMA   To: [employer]    Sent: 1/7/2021 2:37:41 PM CST  Subject: Medication Management     ** Not Approved: Patient needs appointment **  hydrocortisone/neomycin/polymyxin B otic (NEOMYCIN/POLYMYXIN/HYDROCORTISONE 1% OTIC SUSPENSION)  INSTILL FOUR DROPS IN EACH EAR THREE TIMES A DAY FOR SEVEN DAY(S)  Qty:  10 mL        Days Supply:  7        Refills:  0          Substitutions Allowed     Route To Pharmacy - [facility]   Signed by Bety Neri CMA            ------------------------------------------  From: "[employer]  To: Misael Crow PA-C  Sent: January 7, 2021 2:32:19 PM CST  Subject: Medication Management  Due: January 6, 2021 9:13:59 PM CST     ** On Hold Pending Signature **     Drug: hydrocortisone/neomycin/polymyxin B otic (hydrocortisone/neomycin/polymyxin B 1%-0.35%-10,000 units/mL otic suspension), INSTILL FOUR DROPS IN EACH EAR THREE TIMES A DAY FOR SEVEN DAY(S)  Quantity: 10 mL  Days Supply: 7  Refills: 0  Substitutions Allowed  Notes from Pharmacy:     Dispensed Drug: hydrocortisone/neomycin/polymyxin B otic (hydrocortisone/neomycin/polymyxin B 1%-0.35%-10,000 units/mL otic suspension), INSTILL FOUR DROPS IN EACH EAR THREE TIMES A DAY FOR SEVEN DAY(S)  Quantity: 10 mL  Days Supply: 7  Refills: 0  Substitutions Allowed  Notes from Pharmacy:  ------------------------------------------

## 2022-02-16 NOTE — LETTER
(Inserted Image. Unable to display)   March 05, 2020      NOY CRUZ  [address] [place], [place] [id number]        Dear NOY,      Thank you for selecting [facility] for your healthcare needs.     Chest x-ray:    IMPRESSION: Negative chest. No interstitial lung disease. Nothing for calcified  pleural plaques.          Please contact me or my assistant at [phone] if you have any questions or concerns.     Sincerely,        Bay Majano MD

## 2022-02-16 NOTE — NURSING NOTE
Comprehensive Intake Entered On:  11/2/2021 3:18 PM CDT    Performed On:  11/2/2021 3:12 PM CDT by Martina Lin CMA               Summary   Chief Complaint :   DM. Concerned about hearing loss is the left ear following increased [place] shooting [place] 6 weeks ago. No pain associated.   Martina Lin CMA - 11/2/2021 3:20 PM CDT     Menstrual Status :   N/A   Weight Measured :   164.4 lb(Converted to: 164 lb 6 oz, 74.571 kg)    Height/Length Estimated :   67 in(Converted to: 5 ft 7 in, 170.18 cm)    Systolic Blood Pressure :   142 mmHg (HI)    Diastolic Blood Pressure :   77 mmHg   Mean Arterial Pressure :   99 mmHg   Peripheral Pulse Rate :   84 bpm   BP Site :   Right arm   BP Method :   Electronic   Martina Lin CMA - 11/2/2021 3:12 PM CDT   Health Status   Allergies Verified? :   Yes   Medication History Verified? :   Yes   Immunizations Current :   No   Medical History Verified? :   Yes   Pre-Visit Planning Status :   Completed   Tobacco Use? :   Unknown if ever smoked   Martina Lin CMA - 11/2/2021 3:12 PM CDT   Consents   Consent for Immunization Exchange :   Consent Granted   Consent for Immunizations to Providers :   Consent Granted   Martina Lin CMA - 11/2/2021 3:12 PM CDT   Meds / Allergies   (As Of: 11/2/2021 3:18:40 PM CDT)   Allergies (Active)   No Known Medication Allergies  Estimated Onset Date:   Unspecified ; Created By:   Martina Lin CMA; Reaction Status:   Active ; Category:   Drug ; Substance:   No Known Medication Allergies ; Type:   Allergy ; Updated By:   Martina Lin CMA; Reviewed Date:   11/2/2021 3:15 PM CDT        Medication List   (As Of: 11/2/2021 3:18:40 PM CDT)   Prescription/Discharge Order    acetaminophen-oxycodone  :   acetaminophen-oxycodone ; Status:   Prescribed ; Ordered As Mnemonic:   Percocet 5/325 oral tablet ; Simple Display Line:   1 tab(s), Oral, q4 hrs, Needs to be seen for further refills, PRN: for pain, 15 tab(s), 0 Refill(s) ; Ordering Provider:    Kolton Majano MD; Catalog Code:   acetaminophen-oxycodone ; Order Dt/Tm:   9/20/2021 2:24:55 PM CDT          atropine-diphenoxylate  :   atropine-diphenoxylate ; Status:   Prescribed ; Ordered As Mnemonic:   Lomotil 2.5 mg-0.025 mg oral tablet ; Simple Display Line:   1 tab(s), po, tid, 90 tab(s), 5 Refill(s) ; Ordering Provider:   Kolton Majano MD; Catalog Code:   atropine-diphenoxylate ; Order Dt/Tm:   4/12/2021 3:11:49 PM CDT          bromocriptine  :   bromocriptine ; Status:   Prescribed ; Ordered As Mnemonic:   Cycloset 0.8 mg oral tablet ; Simple Display Line:   3 tab(s), Oral, qam, DOSE INCREASE 6/24/2020., 280 EA, 0 Refill(s) ; Ordering Provider:   Kolton Majano MD; Catalog Code:   bromocriptine ; Order Dt/Tm:   8/31/2021 2:23:17 PM CDT          dutasteride  :   dutasteride ; Status:   Prescribed ; Ordered As Mnemonic:   dutasteride 0.5 mg oral capsule ; Simple Display Line:   1 cap(s), Oral, daily, 95 EA, 0 Refill(s) ; Ordering Provider:   Kolton Majano MD; Catalog Code:   dutasteride ; Order Dt/Tm:   9/7/2021 2:01:02 PM CDT          ipratropium nasal  :   ipratropium nasal ; Status:   Prescribed ; Ordered As Mnemonic:   Atrovent 42 mcg/inh nasal spray ; Simple Display Line:   2 spray(s), nasal, qid, PRN: for nasal congestion, 1 EA, 2 Refill(s) ; Ordering Provider:   Misael Crow PA-C; Catalog Code:   ipratropium nasal ; Order Dt/Tm:   5/1/2018 10:02:36 AM CDT          lidocaine topical  :   lidocaine topical ; Status:   Prescribed ; Ordered As Mnemonic:   lidocaine 5% topical ointment ; Simple Display Line:   1 nini, top, tid, 35 gm, 3 Refill(s) ; Ordering Provider:   Kolton Majano MD; Catalog Code:   lidocaine topical ; Order Dt/Tm:   12/19/2019 3:27:49 PM CST          metFORMIN  :   metFORMIN ; Status:   Prescribed ; Ordered As Mnemonic:   metFORMIN 500 mg oral tablet, extended release ; Simple Display Line:   4 tab(s), Oral, daily, 380 tab(s), 3 Refill(s) ; Ordering Provider:   Melecio JOHNSON,  Kolton; Catalog Code:   metFORMIN ; Order Dt/Tm:   4/12/2021 4:04:12 PM CDT          modafinil  :   modafinil ; Status:   Prescribed ; Ordered As Mnemonic:   modafinil 200 mg oral tablet ; Simple Display Line:   200 mg, 1 tab(s), po, qam, [facility], 30 tab(s), 0 Refill(s) ; Ordering Provider:   Kolton Majano MD; Catalog Code:   modafinil ; Order Dt/Tm:   4/12/2021 3:11:11 PM CDT          sildenafil  :   sildenafil ; Status:   Prescribed ; Ordered As Mnemonic:   sildenafil 100 mg oral tablet ; Simple Display Line:   100 mg, 1 tab(s), Oral, daily, 1 hour before sexual activity, 30 tab(s), 5 Refill(s) ; Ordering Provider:   Kolton Majano MD; Catalog Code:   sildenafil ; Order Dt/Tm:   10/8/2021 12:02:01 PM CDT          triamcinolone topical  :   triamcinolone topical ; Status:   Prescribed ; Ordered As Mnemonic:   triamcinolone 0.1% topical cream ; Simple Display Line:   1 nini, TOP, bid, use for no more than 2wks at a time, 60 gm, 1 Refill(s) ; Ordering Provider:   Kolton Majano MD; Catalog Code:   triamcinolone topical ; Order Dt/Tm:   12/19/2019 3:36:42 PM CST          venlafaxine  :   venlafaxine ; Status:   Prescribed ; Ordered As Mnemonic:   venlafaxine 75 mg oral capsule, extended release ; Simple Display Line:   75 mg, 1 cap(s), PO, Daily, 95 cap(s), 1 Refill(s) ; Ordering Provider:   Kolton Majano MD; Catalog Code:   venlafaxine ; Order Dt/Tm:   6/30/2020 6:20:30 AM CDT            Home Meds    acetaminophen  :   acetaminophen ; Status:   Documented ; Ordered As Mnemonic:   acetaminophen ; Simple Display Line:   325 mg, po, q4 hrs, PRN: as needed for pain ; Catalog Code:   acetaminophen ; Order Dt/Tm:   7/1/2014 1:21:25 PM CDT          acetaminophen-oxycodone  :   acetaminophen-oxycodone ; Status:   Documented ; Ordered As Mnemonic:   acetaminophen-oxycodone 325 mg-5 mg oral tablet ; Simple Display Line:   1 tab(s), po, daily, PRN: as needed for pain ; Catalog Code:    acetaminophen-oxycodone ; Order Dt/Tm:   9/5/2012 2:11:41 PM CDT          biotin  :   biotin ; Status:   Documented ; Ordered As Mnemonic:   biotin 300 mcg oral tablet ; Simple Display Line:   300 mcg, 1 tab(s), po, daily ; Catalog Code:   biotin ; Order Dt/Tm:   7/1/2014 1:23:25 PM CDT          ibuprofen  :   ibuprofen ; Status:   Documented ; Ordered As Mnemonic:   ibuprofen 600 mg oral tablet ; Simple Display Line:   600 mg, 1 tab(s), po, daily, PRN: as needed for pain ; Catalog Code:   ibuprofen ; Order Dt/Tm:   9/5/2012 2:09:45 PM CDT          Miscellaneous Prescription  :   Miscellaneous Prescription ; Status:   Documented ; Ordered As Mnemonic:   Vitamin C ; Simple Display Line:   See Instructions, 1000mg tablet po bid ; Catalog Code:   Miscellaneous Prescription ; Order Dt/Tm:   7/1/2014 1:22:31 PM CDT

## 2022-02-16 NOTE — TELEPHONE ENCOUNTER
---------------------  From: Radha Boateng MD   To: Appointment Pool ([id number]);     Sent: 2/12/2021 12:31:10 PM CST  Subject: General Message     Please schedule patient for curbside testing today.  Thank you!  Radha Boateng, MDscheduled for 3:00

## 2022-02-16 NOTE — PROGRESS NOTES
Chief Complaint    Patient is here for possible strep throat. c/o nausea. Has been taking cough medicine.  History of Present Illness      Became sick 4 days ago. Began with sore throat and nausea. Did not vomit. Having body aches and fevers.  Review of Systems      No rashes. No diarrhea. No change in urination.  Physical Exam   Vitals & Measurements    T: 102.6(Tympanic)  HR: 106(Peripheral)  BP: 140/78  SpO2: 97%     HT: 67 in  WT: 156.8 lb  BMI: 24.56       General: No acute distress but does appear tired.         Musculoskeletal: Normal gait but slow       HEENT: Normal tympanic membranes and no pharyngeal erythema       Neck: Without lymphadenopathy       Lungs: Clear       Heart: Regular rate and rhythm       Abdomen: Soft, nontender nondistended       Positive for influenza       Chest x-ray: no infiltrates and reviewed images with the patient       WBC: 6.6  Assessment/Plan   Influenza A: We will start Tamiflu.  Patient is a little unsteady on his feet but declines hospitalization.  I did talk to a friend of his and will pick him up and take him home.  He will follow-up if things get worse or go to the ER.  Patient Information     Name:NOY CRUZ      Address:      [address] [place] [address]     Sex:Male     YOB: 1950     [phone]     Emergency Contact:[facility] EMERGENCY, CONTACT     MRN:[id number]     FIN:[id number]     Location:[facility]     Date of Service:01/02/2018      Primary Care Physician:       Kolton Majano MD, [phone]  Procedure/Surgical History     Colonoscopy (11/21/2017)     Right hemicolectomy (06/26/2014)     Colonoscopy (01/27/2009)     Biopsy of prostate x 3 (2008)     Repair of tendon of upper limb (02/13/2002)     Extraction of wisdom tooth (1971)  Medications        Vitamin C: See Instructions, 1000mg tablet po bid.        acetaminophen: 325 mg, po, q4 hrs, PRN: as needed for pain.         acetaminophen-oxycodone 325 mg-5 mg oral tablet: 1 tab(s), po, daily, PRN: as needed for pain.        Lomotil 2.5 mg-0.025 mg oral tablet: 1-2 tab(s), po, qid, 180 tab(s), 5 Refill(s).        biotin 300 mcg oral tablet: 300 mcg, 1 tab(s), po, daily.        bromocriptine 0.8 mg oral tablet: 0.8 mg, 1 tab(s), po, qam, 95 tab(s), 3 Refill(s).        Prevalite 4 g/5.5 g oral powder for reconstitution: 4 gm, po, daily, 90 EA, 3 Refill(s).        Avodart 0.5 mg oral capsule: 0.5 mg, 1 cap(s), PO, Daily, 30 cap(s), 1 Refill(s).        ibuprofen 600 mg oral tablet: 600 mg, 1 tab(s), po, daily, PRN: as needed for pain.        lidocaine 5% topical ointment: 1 nini, top, tid, 35 gm, 3 Refill(s).        metFORMIN 500 mg oral tablet, extended release: 2,000 mg, 4 tab(s), po, daily, 370 tab(s), 3 Refill(s).        modafinil 200 mg oral tablet: 200 mg, 1 tab(s), po, qam, PRN.        Viagra 100 mg oral tablet: 50 mg, 0.5 tab(s), po, daily, take when needed., 10 tab(s), 3 Refill(s).        triamcinolone 0.1% topical cream: 1 nini, TOP, bid, use for no more than 2wks at a time, 60 gm, 1 Refill(s).        venlafaxine 75 mg oral capsule, extended release: 75 mg, 1 cap(s), PO, Daily, 30 cap(s).                Allergies    No known allergies

## 2022-02-16 NOTE — TELEPHONE ENCOUNTER
---------------------  From: Kamryn Marquez MA ([facility]Rx Pool ([id number]_[facility]))   To: [facility] Message Pool ([id number]);     Sent: 2/21/2019 9:29:57 AM CST  Subject: FW: Medication Management   Due Date/Time: 2/22/2019 9:19:00 AM CST       has appt today      ** Patient matched by Kamryn Marquez MA on 2/21/2019 9:26:59 AM CST **      ------------------------------------------  From: [facility]  To: Kolton Majano MD  Sent: February 21, 2019 9:19:44 AM CST  Subject: Medication Management  Due: February 22, 2019 9:19:44 AM CST    ** On Hold Pending Signature **  Drug: sildenafil (sildenafil 20 mg oral tablet)  TAKE 2.5 TABLETS BY MOUTH ONCE DAILY AS NEEDED 30-60 MINUTES PRIOR TO SEXUAL ACTIVITY  Quantity: 10 unknown unit  Days Supply: 0         Refills: 0  Substitutions Allowed  Notes from Pharmacy:     Dispensed Drug: sildenafil (sildenafil 20 mg oral tablet)  TAKE 2.5 TABLETS BY MOUTH ONCE DAILY AS NEEDED 30-60 MINUTES PRIOR TO SEXUAL ACTIVITY  Quantity: 10 unknown unit  Days Supply: 0         Refills: 0  Substitutions Allowed  Notes from Pharmacy:     ** On Hold Pending Signature **  Drug: bromocriptine (Cycloset 0.8 mg oral tablet)  TAKE ONE TABLET BY MOUTH EVERY MORNING  Quantity: 95 unknown unit  Days Supply: 0         Refills: 0  Substitutions Allowed  Notes from Pharmacy:     Dispensed Drug: bromocriptine (Cycloset 0.8 mg oral tablet)  TAKE ONE TABLET BY MOUTH EVERY MORNING  Quantity: 95 unknown unit  Days Supply: 0         Refills: 0  Substitutions Allowed  Notes from Pharmacy:   ---------------------------------------------------------------  From: Blanca Chowdhury CMA ([facility] Message Pool ([id number]_[facility]))   To: Kolton Majano MD;     Sent: 2/21/2019 9:41:29 AM CST  Subject: FW: Medication Management   Due Date/Time: 2/22/2019 9:19:00 AM CST---------------------  From: Kolton Majano MD   To: [facility] [place] Pool ([id number]);     Sent: 2/21/2019 9:57:11 AM CST  Subject: RE:  Medication Management     Will address at visit today

## 2022-02-16 NOTE — TELEPHONE ENCOUNTER
---------------------  From: Blanca Chowdhury CMA ([employer] Message Pool ([id number]_[facility]))   To: Kolton Majano MD;     Sent: 9/3/2020 12:47:38 PM CDT  Subject: FW: Medication Management   Due Date/Time: 9/4/2020 11:24:00 AM CDT     Advise on refill as pt should have enough medication as it was filled in June for #10 and refills 10        ---------------------  From: Teresa Salas LPN (eRx Pool ([id number]_[facility]))   To: [employer] Pool ([id number]);     Sent: 9/3/2020 11:55:57 AM CDT  Subject: FW: Medication Management   Due Date/Time: 9/4/2020 11:24:00 AM CDT     Last fill was 06/24/20 #10 with 10 refills.   Last appt was 09/03/20 impacted cerumen      ------------------------------------------  From: [employer]  To: Kolton Majano MD  Sent: September 3, 2020 11:24:02 AM CDT  Subject: Medication Management  Due: September 1, 2020 8:09:49 PM CDT     ** On Hold Pending Signature **     Dispensed Drug: sildenafil (sildenafil 100 mg oral tablet), TAKE ONE-HALF TABLET BY MOUTH AS NEEDED 30-60 MINUTES PRIOR TO SEXUAL ACTIVITY - DOSE CHANGE  Quantity: 5 unknown unit  Days Supply: 0  Refills: 0  Substitutions Allowed  Notes from Pharmacy:  ---------------------------------------------------------------  From: Kolton Majano MD   To: Blanca Chowdhury CMA;     Sent: 9/3/2020 1:07:09 PM CDT  Subject: RE: Medication Management     Yes, unsure reason for being requested as refills from June---------------------  From: Blanca Chowdhury CMA   To: [facility]    Sent: 9/3/2020 2:09:30 PM CDT  Subject: RE: Medication Management     ** Not Approved: Refill not appropriate, refilled 6/2020 for #10 refills 10, pt should have enough refills **  sildenafil (SILDENAFIL   TAB 100MG TABLET)  TAKE ONE-HALF TABLET BY MOUTH AS NEEDED 30-60 MINUTES PRIOR TO SEXUAL ACTIVITY - DOSE CHANGE  Qty:  5 unknown unit        Days Supply:  0        Refills:  0          Substitutions Allowed     Route To [facility]acy - [facility]   Signed by Blanca Chowdhury CMA

## 2022-02-16 NOTE — LETTER
(Inserted Image. Unable to display)               [address] [place] [address]  [phone]      June 09, 2021      NOY CRUZ  [address] [place] [address]      Dear NOY,       Thank you for selecting [facility] for your healthcare needs. Below you will find the result of your recent test(s) done at our clinic.     Your urinalysis was unremarkable except for some glucose.          Please contact my practice at [phone] if you have any questions or concerns.     Sincerely,        Meek Serna PA-C

## 2022-02-16 NOTE — TELEPHONE ENCOUNTER
Entered by Radhika Taylor CMA on January 17, 2019 1:47:10 PM CST  ---------------------  From: Radhika Taylor CMA   To: [facility]    Sent: 1/17/2019 1:47:10 PM CST  Subject: Medication Management     ** Submitted: **  Order:sildenafil (sildenafil 20 mg oral tablet)  2.5 tab(s)  Oral  daily  30-60 MINUTES PRIOR TO SEXUAL ACTIVITY.  Qty:  10 tab(s)        Refills:  0          SHADE     PRN  AS NEEDED      Route To Pharmacy - [facility]    Signed by Radhika Taylor CMA  1/17/2019 1:46:00 PM    ** Submitted: **  Complete:sildenafil (Viagra 100 mg oral tablet)   Signed by Radhika Taylor CMA  1/17/2019 1:47:00 PM    ** Not Approved:  **  sildenafil (SILDENAFIL 20MG TAB 20 MG TABLET)  TAKE 2.5 TABLETS BY MOUTH ONCE DAILY AS NEEDED 30-60 MINUTES PRIOR TO SEXUAL ACTIVITY  Qty:  50 unknown unit        Days Supply:  0        Refills:  0          SHADE     Route To Pharmacy - [facility]   Signed by Radhika Taylor CMA            ** Submitted: **  Order:metFORMIN (metFORMIN 500 mg oral tablet, extended release)  4 tab(s)  Oral  daily  Qty:  160 tab(s)        Duration:  40 day(s)        Refills:  0          SHADE     Route To Pharmacy - [facility]    Signed by Radhika Taylor CMA  1/17/2019 1:44:00 PM    ** Submitted: **  Complete:metFORMIN (metFORMIN 500 mg oral tablet, extended release)   Signed by Radhika Taylor CMA  1/17/2019 1:46:00 PM    ** Not Approved:  **  metFORMIN (METFORMIN-ER  500 MG  TAB TABLET)  TAKE FOUR TABLETS BY MOUTH ONCE DAILY  Qty:  360 unknown unit        Days Supply:  0        Refills:  0          SHADE     Route To Pharmacy - [facility]   Signed by Radhika Taylor CMA            ** Patient matched by Radhika Taylor CMA on 1/17/2019 1:35:01 PM CST **      ------------------------------------------  From: [facility]  To: Kolton Majano MD  Sent: January 17, 2019 1:24:31 PM CST  Subject: Medication Management  Due: January 18, 2019 1:24:31 PM CST    **  On Hold Pending Signature **  Drug: sildenafil (sildenafil 20 mg oral tablet)  TAKE 2.5 TABLETS BY MOUTH ONCE DAILY AS NEEDED 30-60 MINUTES PRIOR TO SEXUAL ACTIVITY  Quantity: 50 unknown unit  Days Supply: 0         Refills: 0  Substitutions Allowed  Notes from Pharmacy:     Dispensed Drug: sildenafil (sildenafil 20 mg oral tablet)  TAKE 2.5 TABLETS BY MOUTH ONCE DAILY AS NEEDED 30-60 MINUTES PRIOR TO SEXUAL ACTIVITY  Quantity: 50 unknown unit  Days Supply: 0         Refills: 0  Substitutions Allowed  Notes from Pharmacy:     ** On Hold Pending Signature **  Drug: metFORMIN (metFORMIN 500 mg oral tablet, extended release)  TAKE FOUR TABLETS BY MOUTH ONCE DAILY  Quantity: 360 unknown unit Days Supply: 0         Refills: 0  Substitutions Allowed  Notes from Pharmacy:     Dispensed Drug: metFORMIN (metFORMIN 500 mg oral tablet, extended release)  TAKE FOUR TABLETS BY MOUTH ONCE DAILY  Quantity: 360 unknown unit Days Supply: 0         Refills: 0  Substitutions Allowed  Notes from Pharmacy:   ------------------------------------------Med Refill      Date of last office visit and reason:  10/3/18 Rash      Date of last Med Check / Px:   12/26/17   Date of last labs pertaining to med:  12/21/17    RTC order in chart:  Yes pt is overdue appt. I sent in enough to make it to his scheduled upcoming appt. He is scheduled lab work on 2/18/19 and follow up appt on 2/21/19

## 2022-02-16 NOTE — TELEPHONE ENCOUNTER
---------------------  From: Jennifer Bryant (Phone Messages Pool ([id number]_[facility]))   To: [facility] Message Pool ([id number]_[place] [facility] [place]);     Sent: 6/29/2020 3:17:49 PM CDT  Subject: General Message     Phone Message    PCP: ASHVIN    Time of Call: 1442    Phone Number: [phone]    Note: [facility]. [facility] stated that [facility] is saying that [facility] is not [facility] for Venlafaxine anymore. Patient states he only has 6 pills left and he is going to need a refill soon. Please advise.     [facility]acy[facility]     Last office visit and reason: 6/24/20 Medication Management    Transferred to: Consulted [facility] [place]---------------------  From: Blanca Chowdhury CMA ([facility] Message Pool ([id number]_[facility]))   To: Kolton Majano MD;     Sent: 6/29/2020 4:11:14 PM CDT  Subject: FW: General Message---------------------  From: Kolton Majano MD   To: Blanca Chowdhury CMA; Qian ESPINAL, Ina;     Sent: 6/30/2020 6:22:56 AM CDT  Subject: RE: General Message     Check with him to make sure I sent the correct dose as we have that he is on Venlaxafine 75mg dailyTried to reach pt, unable to LM.Called and spoke with Nadeem, informed him that medication was sent in and he confirmed that dose was correct  any problems he will call clinic back

## 2022-02-16 NOTE — NURSING NOTE
Comprehensive Intake Entered On:  6/24/2020 2:58 PM CDT    Performed On:  6/24/2020 2:54 PM CDT by Blanca Chowdhury CMA               Summary   Chief Complaint :   Medication management and follow up labs   Weight Measured :   161 lb(Converted to: 161 lb 0 oz, 73.03 kg)    Height Measured :   67 in(Converted to: 5 ft 7 in, 170.18 cm)    Body Mass Index :   25.21 kg/m2 (HI)    Body Surface Area :   1.86 m2   Systolic Blood Pressure :   122 mmHg   Diastolic Blood Pressure :   76 mmHg   Mean Arterial Pressure :   91 mmHg   Peripheral Pulse Rate :   76 bpm   BP Site :   Right arm   Pulse Site :   Radial artery   BP Method :   Manual   HR Method :   Manual   Temperature Tympanic :   97.1 DegF(Converted to: 36.2 DegC)  (LOW)    Blanca Chowdhury CMA - 6/24/2020 2:54 PM CDT   Health Status   Allergies Verified? :   Yes   Medication History Verified? :   Yes   Immunizations Current :   No   Medical History Verified? :   No   Pre-Visit Planning Status :   Not completed   Tobacco Use? :   Current every day smoker   Blanca Chowdhury CMA - 6/24/2020 2:54 PM CDT   Consents   Consent for Immunization Exchange :   Consent Granted   Consent for Immunizations to Providers :   Consent Granted   Blanca Chowdhury CMA - 6/24/2020 2:54 PM CDT   Meds / Allergies   (As Of: 6/24/2020 2:58:50 PM CDT)   Allergies (Active)   No known allergies  Estimated Onset Date:   Unspecified ; Created By:   Gena Adan LPN; Reaction Status:   Active ; Category:   Drug ; Substance:   No known allergies ; Type:   Allergy ; Updated By:   Gena Adan LPN; Reviewed Date:   6/24/2020 2:56 PM CDT        Medication List   (As Of: 6/24/2020 2:58:50 PM CDT)   Prescription/Discharge Order    atropine-diphenoxylate  :   atropine-diphenoxylate ; Status:   Prescribed ; Ordered As Mnemonic:   Lomotil 2.5 mg-0.025 mg oral tablet ; Simple Display Line:   1 tab(s), po, tid, 90 tab(s), 5 Refill(s) ; Ordering Provider:   Kolton Majano MD; Catalog Code:    atropine-diphenoxylate ; Order Dt/Tm:   6/4/2020 11:42:36 AM CDT          bromocriptine  :   bromocriptine ; Status:   Prescribed ; Ordered As Mnemonic:   bromocriptine 0.8 mg oral tablet ; Simple Display Line:   1.6 mg, 2 tab(s), po, qam, 190 tab(s), 3 Refill(s) ; Ordering Provider:   Kolton Majano MD; Catalog Code:   bromocriptine ; Order Dt/Tm:   12/19/2019 3:27:14 PM CST          dutasteride  :   dutasteride ; Status:   Prescribed ; Ordered As Mnemonic:   Avodart 0.5 mg oral capsule ; Simple Display Line:   0.5 mg, 1 cap(s), PO, Daily, 95 cap(s), 3 Refill(s) ; Ordering Provider:   Kolton Majano MD; Catalog Code:   dutasteride ; Order Dt/Tm:   12/19/2019 3:28:29 PM CST          ipratropium nasal  :   ipratropium nasal ; Status:   Prescribed ; Ordered As Mnemonic:   Atrovent 42 mcg/inh nasal spray ; Simple Display Line:   2 spray(s), nasal, qid, PRN: for nasal congestion, 1 EA, 2 Refill(s) ; Ordering Provider:   Misael Crow PA-C; Catalog Code:   ipratropium nasal ; Order Dt/Tm:   5/1/2018 10:02:36 AM CDT          lidocaine topical  :   lidocaine topical ; Status:   Prescribed ; Ordered As Mnemonic:   lidocaine 5% topical ointment ; Simple Display Line:   1 nini, top, tid, 35 gm, 3 Refill(s) ; Ordering Provider:   Kolton Majano MD; Catalog Code:   lidocaine topical ; Order Dt/Tm:   12/19/2019 3:27:49 PM CST          metFORMIN  :   metFORMIN ; Status:   Prescribed ; Ordered As Mnemonic:   metFORMIN 500 mg oral tablet, extended release ; Simple Display Line:   4 tab(s), Oral, daily, 380 tab(s), 3 Refill(s) ; Ordering Provider:   Kolton Majano MD; Catalog Code:   metFORMIN ; Order Dt/Tm:   12/19/2019 3:27:43 PM CST          sildenafil  :   sildenafil ; Status:   Prescribed ; Ordered As Mnemonic:   sildenafil 20 mg oral tablet ; Simple Display Line:   2.5 tab(s), Oral, daily, 30-60 MINUTES PRIOR TO SEXUAL ACTIVITY., PRN: AS NEEDED, 10 tab(s), 10 Refill(s) ; Ordering Provider:   Kolton Majano MD; Catalog  Code:   sildenafil ; Order Dt/Tm:   12/19/2019 3:28:02 PM CST          triamcinolone topical  :   triamcinolone topical ; Status:   Prescribed ; Ordered As Mnemonic:   triamcinolone 0.1% topical cream ; Simple Display Line:   1 nini, TOP, bid, use for no more than 2wks at a time, 60 gm, 1 Refill(s) ; Ordering Provider:   Kolton Majano MD; Catalog Code:   triamcinolone topical ; Order Dt/Tm:   12/19/2019 3:36:42 PM CST            Home Meds    acetaminophen  :   acetaminophen ; Status:   Documented ; Ordered As Mnemonic:   acetaminophen ; Simple Display Line:   325 mg, po, q4 hrs, PRN: as needed for pain ; Catalog Code:   acetaminophen ; Order Dt/Tm:   7/1/2014 1:21:25 PM CDT          acetaminophen-oxycodone  :   acetaminophen-oxycodone ; Status:   Documented ; Ordered As Mnemonic:   acetaminophen-oxycodone 325 mg-5 mg oral tablet ; Simple Display Line:   1 tab(s), po, daily, PRN: as needed for pain ; Catalog Code:   acetaminophen-oxycodone ; Order Dt/Tm:   9/5/2012 2:11:41 PM CDT          biotin  :   biotin ; Status:   Documented ; Ordered As Mnemonic:   biotin 300 mcg oral tablet ; Simple Display Line:   300 mcg, 1 tab(s), po, daily ; Catalog Code:   biotin ; Order Dt/Tm:   7/1/2014 1:23:25 PM CDT          ibuprofen  :   ibuprofen ; Status:   Documented ; Ordered As Mnemonic:   ibuprofen 600 mg oral tablet ; Simple Display Line:   600 mg, 1 tab(s), po, daily, PRN: as needed for pain ; Catalog Code:   ibuprofen ; Order Dt/Tm:   9/5/2012 2:09:45 PM CDT          Miscellaneous Prescription  :   Miscellaneous Prescription ; Status:   Documented ; Ordered As Mnemonic:   Vitamin C ; Simple Display Line:   See Instructions, 1000mg tablet po bid ; Catalog Code:   Miscellaneous Prescription ; Order Dt/Tm:   7/1/2014 1:22:31 PM CDT          modafinil  :   modafinil ; Status:   Documented ; Ordered As Mnemonic:   modafinil 200 mg oral tablet ; Simple Display Line:   200 mg, 1 tab(s), po, qam, PRN ; Catalog Code:   modafinil ;  Order Dt/Tm:   2/6/2014 5:07:37 PM CST          venlafaxine  :   venlafaxine ; Status:   Documented ; Ordered As Mnemonic:   venlafaxine 75 mg oral capsule, extended release ; Simple Display Line:   75 mg, 1 cap(s), PO, Daily, 30 cap(s) ; Catalog Code:   venlafaxine ; Order Dt/Tm:   9/5/2012 2:09:18 PM CDT            ID Risk Screen   Recent Travel History :   No recent travel   Family Member Travel History :   No recent travel   Other Exposure to Infectious Disease :   Unknown   Blanca Chowdhury CMA - 6/24/2020 2:54 PM CDT

## 2022-02-16 NOTE — LETTER
(Inserted Image. Unable to display)   June 12, 2020      NOY CRUZ      [address] [place], [place] [id number]        Dear NOY,    Thank you for selecting [facility] for your healthcare needs.  Below you will find the results of your recent tests done at our clinic.     Your diabetes test (HgbA1c) is 8.4% with goal of < 8.0% which is increased from 7.4%. You may increase Bromocriptine to 3 tabs (2.4mg) daily  Rest of labs are good (Lyme test negative)      Result Name Current Result Previous Result Reference Range   Sodium Level (mmol/L)  138 6/11/2020  135 - 146   Potassium Level (mmol/L)  4.9 6/11/2020  3.5 - 5.3   Glucose Level (mg/dL) ((H)) 208 6/11/2020 ((H)) 178 2/18/2019 65 - 99   Creatinine Level (mg/dL)  0.93 6/11/2020  0.82 2/18/2019 0.70 - 1.25   Hgb A1c ((H)) 8.4 6/11/2020 ((H)) 7.4 12/16/2019  ((H)) 6.9 6/3/2019  - <5.7   Cholesterol (mg/dL)  135 6/11/2020  119 2/18/2019  - <200   HDL (mg/dL)  52 6/11/2020  55 2/18/2019 > OR = 40 -    LDL  62 6/11/2020  46 2/18/2019    Triglyceride (mg/dL)  130 6/11/2020  101 2/18/2019  - <150   Iron Level (mcg/dL)  145 6/11/2020  50 - 180   Iron Saturation  43 6/11/2020  20 - 48   PSA (ng/mL)  0.7 6/11/2020  0.7 2/18/2019  - < OR = 4.0   WBC  5.2 6/11/2020  3.8 - 10.8   Hgb (gm/dL)  13.3 6/11/2020  13.8 2/18/2019 13.2 - 17.1   Platelet  261 6/11/2020  140 - 400   TSH (mIU/L)  0.72 6/11/2020  0.40 - 4.50   Lyme Ab IgG/IgM WB  <0.90 6/11/2020         Please contact me or my assistant at [phone] if you have any questions about your results.     Sincerely,        Kolton Majano MD        What do your labs mean?  Below is a glossary of commonly ordered labs:  LDL   Bad Cholesterol   HDL   Good Cholesterol  AST/ALT   Liver Function   Cr/Creatinine   Kidney Function  Microalbumin   Kidney Function  BUN   Kidney Function  PSA   Prostate    TSH   Thyroid Hormone  HgbA1c   Diabetes Test   Hgb (Hemoglobin)   Red Blood Cells

## 2022-02-16 NOTE — PROGRESS NOTES
Chief Complaint    Verbal consent given for telephone visit. Sx worsening since last visit on 2/10. Now SOB and fever. Laryngitis. No known COVID exposure. Never tested in past.  History of Present Illness       Today's visit was conducted via telephone due to the COVID-19 pandemic. Patient's consent to telephone visit was obtained and documented.       Call Start Time:   12:24pm       Call End Time:    12:31pm       patient at home [place], [place]       physician in clinic, [place], [place]       Patient is a 70-year-old male on the phone today for telemedicine visit due to the coronavirus pandemic.       Patient reports a dry cough for 3 days.  He was feeling okay with it until yesterday afternoon.  He threw up yesterday afternoon and since then has been feeling worse.  He thinks he might have a fever.  No chills.       His cough continues and he is feeling short of breath.       He has not thrown up at all today and is getting fluids and food in today.       He had diarrhea earlier in the week but had a normal bowel movement yesterday.       His ability to taste and smell is worse.       No new myalgias.  Positive fatigue.  No sore throat or headache.       He feels a little woozy today, states that he feels like he is drunk.       He reports a history of pneumonia when he was in the .       He does not check his blood sugars regularly.  His last hemoglobin A1c five months ago was 6.6  Review of Systems       Negative except as listed in HPI          Physical Exam       Telemedicine visit          Assessment/Plan       Cough (R05)          He has many symptoms consistent with coronavirus.  We will set him up for curbside testing at the clinic today.         I am concerned about his shortness of breath and we will check his oxygen saturations when he comes for curbside testing.         I would like him to check his oxygen levels at home.         I discussed with him that I am concerned about his symptoms  and that if he is feeling any worse I would like him to come back to the clinic or go to the emergency room over the weekend.         Patient verbalized understanding.         Ordered:          SARS-CoV-2 RNA (COVID-19), Qualitative NAAT (Request), Cough  Dyspnea  Encounter for screening for COVID-19                Dyspnea (R06.00)         Ordered:          SARS-CoV-2 RNA (COVID-19), Qualitative NAAT (Request), Cough  Dyspnea  Encounter for screening for COVID-19                Encounter for screening for COVID-19 (Z11.52)         Ordered:          SARS-CoV-2 RNA (COVID-19), Qualitative NAAT (Request), Cough  Dyspnea  Encounter for screening for COVID-19           Patient Information     Name:NOY CRUZ      Address:      [address] [place] [id number]     Sex:Male     YOB: 1950     [phone]     Emergency Contact:[facility] EMERGENCY, CONTACT     MRN:[id number]     FIN:[id number]     Location:[facility]     Date of Service:02/12/2021      Primary Care Physician:       Kolton Majano MD, [phone]      Attending Physician:       Radha Boateng MD, [phone]  Problem List/Past Medical History    Ongoing     BPH (benign prostatic hypertrophy)     Colon Polyps     Diabetes mellitus     Refusal of statin medication by patient       Comments: Per 9/21/2017 provider note    Historical     *Hospitalized@[facility] - Sessile serrated adenomas     Tobacco user  Procedure/Surgical History     Cardiac computed tomography for calcium scoring (07/02/2018)            Comments: Total Agatston calcium score is 393.     Colonoscopy (11/21/2017)            Comments: Sedation:  Fentanyl and Versed      Negative for microscopic colitis      F/u in 5 years.     Right hemicolectomy (06/26/2014)           Colonoscopy (01/27/2009)           Biopsy of prostate x 3 (2008)           Repair of tendon of upper limb (02/13/2002)           Extraction of wisdom tooth (1971)         Medications    acetaminophen, 325 mg, Oral, q4 hrs, PRN    acetaminophen-oxycodone 325 mg-5 mg oral tablet, 1 tab(s), Oral, daily, PRN    Atrovent 42 mcg/inh nasal spray, 2 spray(s), Nasal, qid, PRN, 2 refills    Avodart 0.5 mg oral capsule, 0.5 mg= 1 cap(s), Oral, daily, 3 refills    benzonatate 200 mg oral capsule, 200 mg= 1 cap(s), Oral, q8 hrs    biotin 300 mcg oral tablet, 300 mcg= 1 tab(s), Oral, daily    bromocriptine 0.8 mg oral tablet, 2.4 mg= 3 tab(s), Oral, qam, 3 refills    hydrocortisone/neomycin/polymyxin B 1%-0.35%-10,000 units/mL otic suspension, 4 drop(s), Otic, tid    ibuprofen 600 mg oral tablet, 600 mg= 1 tab(s), Oral, daily, PRN    lidocaine 5% topical ointment, 1 nini, Topical, tid, 3 refills    Lomotil 2.5 mg-0.025 mg oral tablet, 1 tab(s), Oral, tid, 5 refills    metFORMIN 500 mg oral tablet, extended release, 4 tab(s), Oral, daily, 3 refills    modafinil 200 mg oral tablet, 200 mg= 1 tab(s), Oral, qam    sildenafil 100 mg oral tablet, 100 mg= 1 tab(s), Oral, daily, 5 refills    triamcinolone 0.1% topical cream, 1 nini, Topical, bid, 1 refills    venlafaxine 75 mg oral capsule, extended release, 75 mg= 1 cap(s), Oral, daily, 1 refills    Vitamin C, See Instructions  Allergies    No known allergies  Social History    Smoking Status     Never smoker     Alcohol      Current, Beer (12 oz), 1-2 times per month, 2 drinks/episode average.     Electronic Cigarette/Vaping      Electronic Cigarette Use: Never.     Employment/School      Retired, Work/School description: [employer]     Exercise      Exercise frequency: 1-2 times/week. Exercise type: Walking, Weight lifting.     Sexual      Sexually active: No. Sexual orientation: Heterosexual.     Substance Abuse      Never     Tobacco      Never (less than 100 in lifetime)  Family History    CABG - Coronary artery bypass graft: Father.    Heart failure: Mother.    Stroke: Mother.    Brother: History is negative  Immunizations      Vaccine  Date Status          influenza virus vaccine, inactivated 03/12/2020 Given          tetanus/diphth/pertuss (Tdap) adult/adol 01/02/2016 Recorded

## 2022-02-16 NOTE — PROGRESS NOTES
Chief Complaint    Cold sx x 2 days.  History of Present Illness      Has head congestion, ears plugged, sneezing and laryngitis. Cough is intermittent and not keeping him up at night.      Has had pneumonia twice in 2003  Review of Systems      No fevers      No vomiting      No shortness of breath      No tooth pain  Physical Exam   Vitals & Measurements    T: 97.5   F (Tympanic)  HR: 84(Peripheral)  RR: 16  BP: 122/66  SpO2: 99%     WT: 161 lb       General: No acute distress.      HENT: Tympanic membranes are clear, No pharyngeal erythema.      Neck: No lymphadenopathy.      Respiratory: Lungs are clear to auscultation.      Cardiovascular: Normal rate, Regular rhythm.      Musculoskeletal: Normal gait.      Discussed and did not feel x-ray indicated today  Assessment/Plan      Cough: Most likely viral URI. We will start NETI pot.  Follow-up if not improving.  Patient Information     Name:NOY CRUZ      Address:      [address] [place] [id number]     Sex:Male     YOB: 1950     [phone]     Emergency Contact:[facility] EMERGENCY, CONTACT     MRN:[id number]     FIN:[id number]     Location:[facility]     Date of Service:08/15/2019      Primary Care Physician:       Kolton Majano MD, [phone]      Attending Physician:       Kloton Majano MD, [phone]  Problem List/Past Medical History    Ongoing     BPH (benign prostatic hypertrophy)     Colon Polyps     Diabetes mellitus     Refusal of statin medication by patient       Comments: Per 9/21/2017 provider note    Historical     *Hospitalized@[facility] - Sessile serrated adenomas     Tobacco user  Medications    acetaminophen, 325 mg, Oral, q4 hrs, PRN    acetaminophen-oxycodone 325 mg-5 mg oral tablet, 1 tab(s), Oral, daily, PRN    Atrovent 42 mcg/inh nasal spray, 2 spray(s), Nasal, qid, PRN, 2 refills    Avodart 0.5 mg oral capsule, 0.5 mg= 1 cap(s), Oral, daily, 1 refills    biotin 300 mcg oral  tablet, 300 mcg= 1 tab(s), Oral, daily    bromocriptine 0.8 mg oral tablet, 0.8 mg= 1 tab(s), Oral, qam, 3 refills    ibuprofen 600 mg oral tablet, 600 mg= 1 tab(s), Oral, daily, PRN    lidocaine 5% topical ointment, 1 nini, Topical, tid, 3 refills    Lomotil 2.5 mg-0.025 mg oral tablet, 1 tab(s), Oral, tid, 5 refills    metFORMIN 500 mg oral tablet, extended release, 4 tab(s), Oral, daily, 3 refills    modafinil 200 mg oral tablet, 200 mg= 1 tab(s), Oral, qam    Prevalite 4 g/5.5 g oral powder for reconstitution, 4 gm, Oral, daily, 3 refills    sildenafil 20 mg oral tablet, 2.5 tab(s), Oral, daily, PRN, 10 refills    triamcinolone 0.1% topical cream, 1 nini, Topical, bid, 1 refills    venlafaxine 75 mg oral capsule, extended release, 75 mg= 1 cap(s), Oral, daily    Vitamin C, See Instructions  Allergies    No known allergies  Lab Results          Lab Results (Last 4 results within 90 days)           Hgb A1c: 6.9 High (06/03/19 10:02:00)

## 2022-02-16 NOTE — PROGRESS NOTES
Patient:   NOY CRUZ            MRN: [id number]            FIN: [id number]               Age:   70 years     Sex:  Male     :  1950   Associated Diagnoses:   Coronavirus infection   Author:   Kolton Majano MD      Visit Information      Date of Service: 2021 11:43 am  Performing Location: [facility]  Encounter#: [id number]      Primary Care Provider (PCP):  Kolton Majano MD    NPI# [id number]      Referring Provider:  Kolton Majano MD    NPI# [id number]   Visit type:  Telephone Encounter.    Source of history:  Patient.    Location of patient:  Home  Call Start Time:   1154  Call End Time:    1203      Chief Complaint   _      History of Present Illness   Today's visit was conducted via telephone due to the COVID-19 pandemic. Patient's consent to telephone visit was obtained and documented.      Reason for visit:  _  Cough is decreasing. Started having symptoms on the  and tested positive on the . Cough was main symptom. Did not get short of breath. Had low grade temperature. Slept a lot.      Review of Systems   Vomited twice over the course of the illness  Had chills      Impression and Plan   Diagnosis     Coronavirus infection ([id number] B34.2).     Feeling much better. Continue to monitor symptoms. Follow up as needed.      Health Status   Allergies:    Allergic Reactions (Selected)  No known allergies   Medications:  (Selected)   Prescriptions  Prescribed  Atrovent 42 mcg/inh nasal spray: 2 spray(s), nasal, qid, PRN: for nasal congestion, # 1 EA, 2 Refill(s), Type: Maintenance, Pharmacy: [facility], 2 spray(s) nasal qid,PRN:for nasal congestion  Avodart 0.5 mg oral capsule: = 1 cap(s) ( 0.5 mg ), PO, Daily, # 95 cap(s), 3 Refill(s), Type: Maintenance, Pharmacy: [facility], 1 cap(s) Oral daily, 67, in, 20 14:54:00 CDT, Height Measured, 161, lb, 20 14:54:00 CDT, Weight Measured  Lomotil 2.5 mg-0.025 mg oral tablet: 1 tab(s), po, tid, # 90 tab(s), 5  Refill(s), Type: Maintenance, Pharmacy: [facility], [place], 1 tab(s) Oral tid, 67, in, 09/23/20 10:31:00 CDT, Height Measured, 162, lb, 09/23/20 10:31:00 CDT, Weight Measured  bromocriptine 0.8 mg oral tablet: = 3 tab(s) ( 2.4 mg ), po, qam, # 280 tab(s), 3 Refill(s), Type: Maintenance, Pharmacy: [facility], 3 tab(s) Oral qam, 67, in, 06/24/20 14:54:00 CDT, Height Measured, 161, lb, 06/24/20 14:54:00 CDT, Weight Measured  lidocaine 5% topical ointment: 1 nini, top, tid, # 35 gm, 3 Refill(s), Type: Maintenance, Pharmacy: [facility], 1 nini Topical tid  metFORMIN 500 mg oral tablet, extended release: = 4 tab(s), Oral, daily, # 380 tab(s), 3 Refill(s), SHADE, Type: Maintenance, Pharmacy: [facility], Pt has appt on 2/21 will give more refills at that time, 4 tab(s) Oral daily, 67, in, 06/24/20 14:54:00 CDT, Height Measured, 161, lb, 06/24/20 14:54:0...  modafinil 200 mg oral tablet: = 1 tab(s) ( 200 mg ), po, qam, Inst[facility]: [facility], # 30 tab(s), 0 Refill(s), Type: Maintenance, Pharmacy: [facility], 1 tab(s) Oral qam,Instr:[facility], 67, in, 09/23/20 10:31:00 CDT, Height Measured...  sildenafil 100 mg oral tablet: = 1 tab(s) ( 100 mg ), Oral, daily, Instructions: 1 hour before sexual activity, # 30 tab(s), 5 Refill(s), Type: Maintenance, Pharmacy: [facility], 1 tab(s) Oral daily,Instr:1 hour before sexual activity, 67, in, 09/23/20 10:31:00 CDT, Height Measur...  triamcinolone 0.1% topical cream: 1 nini, TOP, bid, Instructions: use for no more than 2wks at a time, # 60 gm, 1 Refill(s), Type: Maintenance, Pharmacy: [facility], 1 nini Topical bid,Instr:use for no more than 2wks at a time  venlafaxine 75 mg oral capsule, extended release: = 1 cap(s) ( 75 mg ), PO, Daily, # 95 cap(s), 1 Refill(s), Type: Maintenance, Pharmacy: [facility], 1 cap(s) Oral daily, 67, in, 06/24/20 14:54:00 CDT, Height Measured, 161, lb, 06/24/20 14:54:00 CDT, Weight Measured  Documented  Medications  Documented  Vitamin C: Vitamin C, See Instructions, Instructions: 1000mg tablet po bid, Supply, 0 Refill(s), Type: Maintenance  acetaminophen-oxycodone 325 mg-5 mg oral tablet: 1 tab(s), po, daily, PRN: as needed for pain, 0 Refill(s), Type: Maintenance  acetaminophen: ( 325 mg ), po, q4 hrs, PRN: as needed for pain, 0 Refill(s), Type: Maintenance  biotin 300 mcg oral tablet: 1 tab(s) ( 300 mcg ), po, daily, 0 Refill(s), Type: Maintenance  ibuprofen 600 mg oral tablet: 1 tab(s) ( 600 mg ), po, daily, PRN: as needed for pain, 0 Refill(s), Type: Maintenance   Problem list:    All Problems  Colon Polyps / SNOMED CT 779226962 / Confirmed  BPH (benign prostatic hypertrophy) / SNOMED CT 706599549 / Confirmed  Diabetes mellitus / SNOMED CT 454839931 / Confirmed  Refusal of statin medication by patient / SNOMED CT 469014034 / Confirmed      Histories   Procedure history:    Cardiac computed tomography for calcium scoring (SNOMED CT 7981560488) on 7/2/2018 at 67 Years.  Comments:  7/5/2018 9:26 AM CDT - Kailee Woodward  Total Agatston calcium score is 393  Colonoscopy (SNOMED CT 828111317) performed by Kolton Majano MD on 11/21/2017 at 67 Years.  Comments:  11/29/2017 3:20 PM CST - Larissa Romano  Sedation:  Fentanyl and Versed  Negative for microscopic colitis  F/u in 5 years  Right hemicolectomy (SNOMED CT 377725566) performed by Nitesh Whitehead MD on 6/26/2014 at 63 Years.  Colonoscopy (SNOMED CT 785959734) performed by Shira Christian MD on 1/27/2009 at 58 Years.  Biopsy of prostate x 3 (SNOMED CT 804625432) in 2008 at 58 Years.  Repair of tendon of upper limb (SNOMED CT 446485089) on 2/13/2002 at 51 Years.  Extraction of wisdom tooth (SNOMED CT 501692773) in 1971 at 21 Years.

## 2022-02-16 NOTE — NURSING NOTE
Comprehensive Intake Entered On:  8/15/2019 3:42 PM CDT    Performed On:  8/15/2019 3:37 PM CDT by Elsie Sellers CMA               Summary   Chief Complaint :   Cold sx x 2 days.    Weight Measured :   161 lb(Converted to: 161 lb 0 oz, 73.03 kg)    Systolic Blood Pressure :   122 mmHg   Diastolic Blood Pressure :   66 mmHg   Mean Arterial Pressure :   85 mmHg   Peripheral Pulse Rate :   84 bpm   BP Site :   Right arm   Pulse Site :   Radial artery   BP Method :   Manual   HR Method :   Electronic   Temperature Tympanic :   97.5 DegF(Converted to: 36.4 DegC)  (LOW)    Respiratory Rate :   16 br/min   Oxygen Saturation :   99 %   Elsie Sellers CMA - 8/15/2019 3:37 PM CDT   Health Status   Allergies Verified? :   Yes   Medication History Verified? :   Yes   Immunizations Current :   No   Pre-Visit Planning Status :   Not completed   Elsie Sellers CMA - 8/15/2019 3:37 PM CDT   Meds / Allergies   (As Of: 8/15/2019 3:42:33 PM CDT)   Allergies (Active)   No known allergies  Estimated Onset Date:   Unspecified ; Created By:   Gena Adan LPN; Reaction Status:   Active ; Category:   Drug ; Substance:   No known allergies ; Type:   Allergy ; Updated By:   Gena Adan LPN; Reviewed Date:   6/6/2019 2:58 PM CDT        Medication List   (As Of: 8/15/2019 3:42:33 PM CDT)   Prescription/Discharge Order    atropine-diphenoxylate  :   atropine-diphenoxylate ; Status:   Prescribed ; Ordered As Mnemonic:   Lomotil 2.5 mg-0.025 mg oral tablet ; Simple Display Line:   1 tab(s), po, tid, 90 tab(s), 5 Refill(s) ; Ordering Provider:   Kolton Majano MD; Catalog Code:   atropine-diphenoxylate ; Order Dt/Tm:   6/6/2019 3:13:02 PM          bromocriptine  :   bromocriptine ; Status:   Prescribed ; Ordered As Mnemonic:   bromocriptine 0.8 mg oral tablet ; Simple Display Line:   0.8 mg, 1 tab(s), po, qam, 95 tab(s), 3 Refill(s) ; Ordering Provider:   Kolton Majano MD; Catalog Code:   bromocriptine ; Order Dt/Tm:   2/21/2019  3:08:16 PM          cholestyramine  :   cholestyramine ; Status:   Prescribed ; Ordered As Mnemonic:   Prevalite 4 g/5.5 g oral powder for reconstitution ; Simple Display Line:   4 gm, po, daily, 90 EA, 3 Refill(s) ; Ordering Provider:   Kolton Majano MD; Catalog Code:   cholestyramine ; Order Dt/Tm:   9/18/2017 4:44:38 PM          dutasteride  :   dutasteride ; Status:   Prescribed ; Ordered As Mnemonic:   Avodart 0.5 mg oral capsule ; Simple Display Line:   0.5 mg, 1 cap(s), PO, Daily, 30 cap(s), 1 Refill(s) ; Ordering Provider:   Kolton Majano MD; Catalog Code:   dutasteride ; Order Dt/Tm:   8/30/2017 7:29:29 PM          ipratropium nasal  :   ipratropium nasal ; Status:   Prescribed ; Ordered As Mnemonic:   Atrovent 42 mcg/inh nasal spray ; Simple Display Line:   2 spray(s), nasal, qid, PRN: for nasal congestion, 1 EA, 2 Refill(s) ; Ordering Provider:   Misael Crow PA-C; Catalog Code:   ipratropium nasal ; Order Dt/Tm:   5/1/2018 10:02:36 AM          lidocaine topical  :   lidocaine topical ; Status:   Prescribed ; Ordered As Mnemonic:   lidocaine 5% topical ointment ; Simple Display Line:   1 nini, top, tid, 35 gm, 3 Refill(s) ; Ordering Provider:   Kolton Majano MD; Catalog Code:   lidocaine topical ; Order Dt/Tm:   2/21/2019 3:12:20 PM          metFORMIN  :   metFORMIN ; Status:   Prescribed ; Ordered As Mnemonic:   metFORMIN 500 mg oral tablet, extended release ; Simple Display Line:   4 tab(s), Oral, daily, 380 tab(s), 3 Refill(s) ; Ordering Provider:   Kolton Majano MD; Catalog Code:   metFORMIN ; Order Dt/Tm:   2/21/2019 3:07:51 PM          sildenafil  :   sildenafil ; Status:   Prescribed ; Ordered As Mnemonic:   sildenafil 20 mg oral tablet ; Simple Display Line:   2.5 tab(s), Oral, daily, 30-60 MINUTES PRIOR TO SEXUAL ACTIVITY., PRN: AS NEEDED, 10 tab(s), 10 Refill(s) ; Ordering Provider:   Kolton Majano MD; Catalog Code:   sildenafil ; Order Dt/Tm:   2/21/2019 3:07:32 PM           triamcinolone topical  :   triamcinolone topical ; Status:   Prescribed ; Ordered As Mnemonic:   triamcinolone 0.1% topical cream ; Simple Display Line:   1 nini, TOP, bid, use for no more than 2wks at a time, 60 gm, 1 Refill(s) ; Ordering Provider:   Kolton Majano MD; Catalog Code:   triamcinolone topical ; Order Dt/Tm:   9/18/2017 4:47:11 PM            Home Meds    acetaminophen  :   acetaminophen ; Status:   Documented ; Ordered As Mnemonic:   acetaminophen ; Simple Display Line:   325 mg, po, q4 hrs, PRN: as needed for pain ; Catalog Code:   acetaminophen ; Order Dt/Tm:   7/1/2014 1:21:25 PM          acetaminophen-oxycodone  :   acetaminophen-oxycodone ; Status:   Documented ; Ordered As Mnemonic:   acetaminophen-oxycodone 325 mg-5 mg oral tablet ; Simple Display Line:   1 tab(s), po, daily, PRN: as needed for pain ; Catalog Code:   acetaminophen-oxycodone ; Order Dt/Tm:   9/5/2012 2:11:41 PM          biotin  :   biotin ; Status:   Documented ; Ordered As Mnemonic:   biotin 300 mcg oral tablet ; Simple Display Line:   300 mcg, 1 tab(s), po, daily ; Catalog Code:   biotin ; Order Dt/Tm:   7/1/2014 1:23:25 PM          ibuprofen  :   ibuprofen ; Status:   Documented ; Ordered As Mnemonic:   ibuprofen 600 mg oral tablet ; Simple Display Line:   600 mg, 1 tab(s), po, daily, PRN: as needed for pain ; Catalog Code:   ibuprofen ; Order Dt/Tm:   9/5/2012 2:09:45 PM          Miscellaneous Prescription  :   Miscellaneous Prescription ; Status:   Documented ; Ordered As Mnemonic:   Vitamin C ; Simple Display Line:   See Instructions, 1000mg tablet po bid ; Catalog Code:   Miscellaneous Prescription ; Order Dt/Tm:   7/1/2014 1:22:31 PM          modafinil  :   modafinil ; Status:   Documented ; Ordered As Mnemonic:   modafinil 200 mg oral tablet ; Simple Display Line:   200 mg, 1 tab(s), po, qam, PRN ; Catalog Code:   modafinil ; Order Dt/Tm:   2/6/2014 5:07:37 PM          venlafaxine  :   venlafaxine ; Status:    Documented ; Ordered As Mnemonic:   venlafaxine 75 mg oral capsule, extended release ; Simple Display Line:   75 mg, 1 cap(s), PO, Daily, 30 cap(s) ; Catalog Code:   venlafaxine ; Order Dt/Tm:   9/5/2012 2:09:18 PM

## 2022-02-16 NOTE — NURSING NOTE
Comprehensive Intake Entered On:  6/6/2019 3:00 PM CDT    Performed On:  6/6/2019 2:54 PM CDT by Blanca Chowdhury CMA               Summary   Chief Complaint :   DM check   Weight Measured :   156.2 lb(Converted to: 156 lb 3 oz, 70.85 kg)    Height Measured :   67 in(Converted to: 5 ft 7 in, 170.18 cm)    Body Mass Index :   24.46 kg/m2   Body Surface Area :   1.83 m2   Systolic Blood Pressure :   122 mmHg   Diastolic Blood Pressure :   76 mmHg   Mean Arterial Pressure :   91 mmHg   Peripheral Pulse Rate :   74 bpm   BP Site :   Right arm   Pulse Site :   Radial artery   BP Method :   Manual   HR Method :   Manual   Temperature Tympanic :   97.2 DegF(Converted to: 36.2 DegC)  (LOW)    Blanca Chowdhury CMA - 6/6/2019 2:54 PM CDT   Health Status   Allergies Verified? :   Yes   Medication History Verified? :   Yes   Immunizations Current :   No   Medical History Verified? :   No   Pre-Visit Planning Status :   Completed   Tobacco Use? :   Former smoker   Blanca Chowdhury CMA - 6/6/2019 2:54 PM CDT   Meds / Allergies   (As Of: 6/6/2019 3:00:51 PM CDT)   Allergies (Active)   No known allergies  Estimated Onset Date:   Unspecified ; Created By:   Gena Adan LPN; Reaction Status:   Active ; Category:   Drug ; Substance:   No known allergies ; Type:   Allergy ; Updated By:   Gena Adan LPN; Reviewed Date:   6/6/2019 2:58 PM CDT        Medication List   (As Of: 6/6/2019 3:00:51 PM CDT)   Prescription/Discharge Order    atropine-diphenoxylate  :   atropine-diphenoxylate ; Status:   Prescribed ; Ordered As Mnemonic:   Lomotil 2.5 mg-0.025 mg oral tablet ; Simple Display Line:   1 tab(s), po, tid, 90 tab(s), 5 Refill(s) ; Ordering Provider:   Kolton Majano MD; Catalog Code:   atropine-diphenoxylate ; Order Dt/Tm:   2/21/2019 3:08:45 PM          bromocriptine  :   bromocriptine ; Status:   Prescribed ; Ordered As Mnemonic:   bromocriptine 0.8 mg oral tablet ; Simple Display Line:   0.8 mg, 1 tab(s), po, qam, 95 tab(s), 3  Refill(s) ; Ordering Provider:   Kolton Majano MD; Catalog Code:   bromocriptine ; Order Dt/Tm:   2/21/2019 3:08:16 PM          cholestyramine  :   cholestyramine ; Status:   Prescribed ; Ordered As Mnemonic:   Prevalite 4 g/5.5 g oral powder for reconstitution ; Simple Display Line:   4 gm, po, daily, 90 EA, 3 Refill(s) ; Ordering Provider:   Kolton Majano MD; Catalog Code:   cholestyramine ; Order Dt/Tm:   9/18/2017 4:44:38 PM          dutasteride  :   dutasteride ; Status:   Prescribed ; Ordered As Mnemonic:   Avodart 0.5 mg oral capsule ; Simple Display Line:   0.5 mg, 1 cap(s), PO, Daily, 30 cap(s), 1 Refill(s) ; Ordering Provider:   Kolton Majano MD; Catalog Code:   dutasteride ; Order Dt/Tm:   8/30/2017 7:29:29 PM          ipratropium nasal  :   ipratropium nasal ; Status:   Prescribed ; Ordered As Mnemonic:   Atrovent 42 mcg/inh nasal spray ; Simple Display Line:   2 spray(s), nasal, qid, PRN: for nasal congestion, 1 EA, 2 Refill(s) ; Ordering Provider:   Misael Crow PA-C; Catalog Code:   ipratropium nasal ; Order Dt/Tm:   5/1/2018 10:02:36 AM          lidocaine topical  :   lidocaine topical ; Status:   Prescribed ; Ordered As Mnemonic:   lidocaine 5% topical ointment ; Simple Display Line:   1 nini, top, tid, 35 gm, 3 Refill(s) ; Ordering Provider:   Kolton Majano MD; Catalog Code:   lidocaine topical ; Order Dt/Tm:   2/21/2019 3:12:20 PM          metFORMIN  :   metFORMIN ; Status:   Prescribed ; Ordered As Mnemonic:   metFORMIN 500 mg oral tablet, extended release ; Simple Display Line:   4 tab(s), Oral, daily, 380 tab(s), 3 Refill(s) ; Ordering Provider:   Kolton Majano MD; Catalog Code:   metFORMIN ; Order Dt/Tm:   2/21/2019 3:07:51 PM          sildenafil  :   sildenafil ; Status:   Prescribed ; Ordered As Mnemonic:   sildenafil 20 mg oral tablet ; Simple Display Line:   2.5 tab(s), Oral, daily, 30-60 MINUTES PRIOR TO SEXUAL ACTIVITY., PRN: AS NEEDED, 10 tab(s), 10 Refill(s) ; Ordering  Provider:   Kolton Majano MD; Catalog Code:   sildenafil ; Order Dt/Tm:   2/21/2019 3:07:32 PM          triamcinolone topical  :   triamcinolone topical ; Status:   Prescribed ; Ordered As Mnemonic:   triamcinolone 0.1% topical cream ; Simple Display Line:   1 nini, TOP, bid, use for no more than 2wks at a time, 60 gm, 1 Refill(s) ; Ordering Provider:   Kolton Majano MD; Catalog Code:   triamcinolone topical ; Order Dt/Tm:   9/18/2017 4:47:11 PM            Home Meds    acetaminophen  :   acetaminophen ; Status:   Documented ; Ordered As Mnemonic:   acetaminophen ; Simple Display Line:   325 mg, po, q4 hrs, PRN: as needed for pain ; Catalog Code:   acetaminophen ; Order Dt/Tm:   7/1/2014 1:21:25 PM          acetaminophen-oxycodone  :   acetaminophen-oxycodone ; Status:   Documented ; Ordered As Mnemonic:   acetaminophen-oxycodone 325 mg-5 mg oral tablet ; Simple Display Line:   1 tab(s), po, daily, PRN: as needed for pain ; Catalog Code:   acetaminophen-oxycodone ; Order Dt/Tm:   9/5/2012 2:11:41 PM          biotin  :   biotin ; Status:   Documented ; Ordered As Mnemonic:   biotin 300 mcg oral tablet ; Simple Display Line:   300 mcg, 1 tab(s), po, daily ; Catalog Code:   biotin ; Order Dt/Tm:   7/1/2014 1:23:25 PM          ibuprofen  :   ibuprofen ; Status:   Documented ; Ordered As Mnemonic:   ibuprofen 600 mg oral tablet ; Simple Display Line:   600 mg, 1 tab(s), po, daily, PRN: as needed for pain ; Catalog Code:   ibuprofen ; Order Dt/Tm:   9/5/2012 2:09:45 PM          Miscellaneous Prescription  :   Miscellaneous Prescription ; Status:   Documented ; Ordered As Mnemonic:   Vitamin C ; Simple Display Line:   See Instructions, 1000mg tablet po bid ; Catalog Code:   Miscellaneous Prescription ; Order Dt/Tm:   7/1/2014 1:22:31 PM          modafinil  :   modafinil ; Status:   Documented ; Ordered As Mnemonic:   modafinil 200 mg oral tablet ; Simple Display Line:   200 mg, 1 tab(s), po, qam, PRN ; Catalog Code:    modafinil ; Order Dt/Tm:   2/6/2014 5:07:37 PM          venlafaxine  :   venlafaxine ; Status:   Documented ; Ordered As Mnemonic:   venlafaxine 75 mg oral capsule, extended release ; Simple Display Line:   75 mg, 1 cap(s), PO, Daily, 30 cap(s) ; Catalog Code:   venlafaxine ; Order Dt/Tm:   9/5/2012 2:09:18 PM

## 2022-02-16 NOTE — TELEPHONE ENCOUNTER
---------------------  From: Bety Neri CMA (eRx Pool ([id number]))   To: [facility] Message Pool ([id number] [facility]);     Sent: 8/31/2021 2:03:40 PM CDT  Subject: FW: Medication Management   Due Date/Time: 9/1/2021 12:19:00 PM CDT       Last visit 7/19/21 f/u back pain  Last filled 6/30/21 #280, 0 refills    ------------------------------------------  From: [employer]  To: Kolton Majano MD  Sent: August 31, 2021 12:19:40 PM CDT  Subject: Medication Management  Due: August 20, 2021 11:17:32 AM CDT     ** On Hold Pending Signature **     Drug: bromocriptine (Cycloset 0.8 mg oral tablet), TAKE THREE TABLETS BY MOUTH EVERY MORNING - DOSE INCREASE 6/24/2020  Quantity: 280 EA  Days Supply: 93  Refills: 0  Substitutions Allowed  Notes from Pharmacy:     Dispensed Drug: bromocriptine (Cycloset 0.8 mg oral tablet), TAKE THREE TABLETS BY MOUTH EVERY MORNING - DOSE INCREASE 6/24/2020  Quantity: 280 EA  Days Supply: 93  Refills: 0  Substitutions Allowed  Notes from Pharmacy:  ---------------------------------------------------------------  From: Teresa Salas LPN ([facility] Message Pool ([id number]))   To: Kolton Majano MD;     Sent: 8/31/2021 2:10:32 PM CDT  Subject: FW: Medication Management   Due Date/Time: 9/1/2021 12:19:00 PM CDT---------------------  From: Kolton Majano MD   To: Teresa Salas LPN;     Sent: 8/31/2021 2:23:53 PM CDT  Subject: RE: Medication Management     Sent  Remind him he needs to get his fasting labs donelvm for patient that medication was sent in and to contact clinic to schedule fasting labs

## 2022-02-16 NOTE — PROGRESS NOTES
Chief Complaint    Pt c/o itchy rash on chest that appeared today  History of Present Illness      Itchy rash started today on the chest.  Review of Systems      Still getting over the flu       No fevers       No vomiting  Physical Exam   Vitals & Measurements    T: 98.3(Tympanic)  HR: 78(Peripheral)  BP: 136/76     WT: 156.4 lb       General: No acute distress      Musculoskeletal: Normal gait      Skin: Small about 3-4 mm erythematous lesions with some excoriations around the neck on the left and right.  Assessment/Plan   Skin rash: Rash appears to be in the early stages.  It does cross the midline and not shingles.  Possibly a dermatitis and will continue using triamcinolone.  Follow-up if not improving.  Patient Information     Name:NOY CRUZ      Address:      [address] [place] [id number]     Sex:Male     YOB: 1950     [phone]     Emergency Contact:[facility], CONTACT     MRN:[id number]     FIN:[id number]     Location:[facility]     Date of Service:01/19/2018      Primary Care Physician:       Kolton Majano MD, [phone]  Medications        Vitamin C: See Instructions, 1000mg tablet po bid.        acetaminophen: 325 mg, po, q4 hrs, PRN: as needed for pain.        acetaminophen-oxycodone 325 mg-5 mg oral tablet: 1 tab(s), po, daily, PRN: as needed for pain.        Lomotil 2.5 mg-0.025 mg oral tablet: 1-2 tab(s), po, qid, 180 tab(s), 5 Refill(s).        biotin 300 mcg oral tablet: 300 mcg, 1 tab(s), po, daily.        bromocriptine 0.8 mg oral tablet: 0.8 mg, 1 tab(s), po, qam, 95 tab(s), 3 Refill(s).        Prevalite 4 g/5.5 g oral powder for reconstitution: 4 gm, po, daily, 90 EA, 3 Refill(s).        Avodart 0.5 mg oral capsule: 0.5 mg, 1 cap(s), PO, Daily, 30 cap(s), 1 Refill(s).        ibuprofen 600 mg oral tablet: 600 mg, 1 tab(s), po, daily, PRN: as needed for pain.        lidocaine 5% topical ointment: 1 nini, top, tid, 35 gm,  3 Refill(s).        metFORMIN 500 mg oral tablet, extended release: 2,000 mg, 4 tab(s), po, daily, 370 tab(s), 3 Refill(s).        modafinil 200 mg oral tablet: 200 mg, 1 tab(s), po, qam, PRN.        Viagra 100 mg oral tablet: 50 mg, 0.5 tab(s), po, daily, take when needed., 10 tab(s), 3 Refill(s).        triamcinolone 0.1% topical cream: 1 nini, TOP, bid, use for no more than 2wks at a time, 60 gm, 1 Refill(s).        venlafaxine 75 mg oral capsule, extended release: 75 mg, 1 cap(s), PO, Daily, 30 cap(s).                Allergies    No known allergies

## 2022-02-16 NOTE — PROGRESS NOTES
Patient:   NOY CRUZ            MRN: [id number]            FIN: [id number]               Age:   70 years     Sex:  Male     :  1950   Associated Diagnoses:   Back pain   Author:   Kolton Majano MD      Visit Information      Date of Service: 2021 02:34 pm  Performing Location: [facility]  Encounter#: [id number]      Primary Care Provider (PCP):  Kolton Majano MD    NPI# [id number]      Referring Provider:  Kolton Majano MD    NPI# [id number]      Chief Complaint   2021 2:55 PM CDT    Follow up back pain has injection scheduled with Dr. Trujillo in [place], order for Dexa and colonoscopy order, skin check on back      History of Present Illness   Seen in Spine clinic and will being seen for possible epidural injection. Back pain started 2-3 weeks ago. Had an MRI lumbar spine with mild disc bulge. Pain stable. Has not had any percocet since last Wednesday. Pain occurs across the lower back. Prednisone did not help.  [place] jump landed on a pile of rocks .         Review of Systems   Constitutional:  No fever.    Gastrointestinal:  No vomiting.    No incontinence  No weakness in the legs      Health Status   Allergies:    Allergic Reactions (Selected)  No known allergies   Medications:  (Selected)   Prescriptions  Prescribed  Atrovent 42 mcg/inh nasal spray: 2 spray(s), nasal, qid, PRN: for nasal congestion, # 1 EA, 2 Refill(s), Type: Maintenance, Pharmacy: [facility], 2 spray(s) nasal qid,PRN:for nasal congestion  Cycloset 0.8 mg oral tablet: = 3 tab(s), Oral, qam, Instructions: DOSE INCREASE 2020., # 280 EA, 0 Refill(s), Type: Maintenance, Pharmacy: [email or web address], TAKE THREE TABLETS BY MOUTH EVERY MORNING - DOSE INCREASE 2020, 68, in, 21 9:35:00 CDT, Height Measured, 15...  Lomotil 2.5 mg-0.025 mg oral tablet: 1 tab(s), po, tid, # 90 tab(s), 5 Refill(s), Type: Maintenance, Pharmacy: [facility], 1 tab(s) Oral tid, 68, in, 21  14:26:00 CDT, Height Measured, 163.7, lb, 04/12/21 14:26:00 CDT, Weight Measured  Percocet 5/325 oral tablet: 1 tab(s), Oral, q4 hrs, PRN: for pain, # 15 tab(s), 0 Refill(s), Type: Maintenance, Pharmacy: [facility], 1 tab(s) Oral q4 hrs,PRN:for pain, 68, in, 07/12/21 12:29:00 CDT, Height Measured, 159.9, lb, 07/12/21 12:29:00 CDT, Weight Measured  dutasteride 0.5 mg oral capsule: = 1 cap(s), Oral, daily, # 95 EA, 0 Refill(s), Type: Maintenance, Pharmacy: [facility], TAKE ONE CAPSULE BY MOUTH ONCE DAILY, 68, in, 06/09/21 9:35:00 CDT, Height Measured, 152.1, lb, 06/09/21 9:35:00 CDT, Weight Measured  lidocaine 5% topical ointment: 1 nini, top, tid, # 35 gm, 3 Refill(s), Type: Maintenance, Pharmacy: [facility], 1 nini Topical tid  metFORMIN 500 mg oral tablet, extended release: = 4 tab(s), Oral, daily, # 380 tab(s), 3 Refill(s), Type: Maintenance, Pharmacy: [facility], 4 tab(s) Oral daily, 68, in, 04/12/21 14:26:00 CDT, Height Measured, 163.7, lb, 04/12/21 14:26:00 CDT, Weight Measured  modafinil 200 mg oral tablet: = 1 tab(s) ( 200 mg ), po, qam, [facility], # [facility] tab(s), 0 Refill(s), Type: Maintenance, Pharmacy: [facility], 1 tab(s) Oral qam,Instr:[facility], 67, in, 04/12/21 14:26:00 CDT, Height Measured...  sildenafil 100 mg oral tablet: = 1 tab(s) ( 100 mg ), Oral, daily, Instructions: 1 hour before sexual activity, # 30 tab(s), 5 Refill(s), Type: Maintenance, Pharmacy: [facility], 1 tab(s) Oral daily,Instr:1 hour before sexual activity, 68, in, 09/23/20 10:31:00 CDT, Height Measur...  triamcinolone 0.1% topical cream: 1 nini, TOP, bid, Instructions: use for no more than 2wks at a time, # 60 gm, 1 Refill(s), Type: Maintenance, Pharmacy: [facility], 1 nini Topical bid,Instr:use for no more than 2wks at a time  venlafaxine 75 mg oral capsule, extended release: = 1 cap(s) ( 75 mg ), PO, Daily, # 95 cap(s), 1 Refill(s), Type: Maintenance, Pharmacy:  [facility], 1 cap(s) Oral daily, 68, in, 06/24/20 14:54:00 CDT, Height Measured, 161, lb, 06/24/20 14:54:00 CDT, Weight Measured  Documented Medications  Documented  Vitamin C: Vitamin C, See Instructions, Instructions: 1000mg tablet po bid, Supply, 0 Refill(s), Type: Maintenance  acetaminophen-oxycodone 325 mg-5 mg oral tablet: 1 tab(s), po, daily, PRN: as needed for pain, 0 Refill(s), Type: Maintenance  acetaminophen: ( 325 mg ), po, q4 hrs, PRN: as needed for pain, 0 Refill(s), Type: Maintenance  biotin 300 mcg oral tablet: 1 tab(s) ( 300 mcg ), po, daily, 0 Refill(s), Type: Maintenance  ibuprofen 600 mg oral tablet: 1 tab(s) ( 600 mg ), po, daily, PRN: as needed for pain, 0 Refill(s), Type: Maintenance   Problem list:    All Problems  Colon Polyps / SNOMED CT 320344011 / Confirmed  BPH (benign prostatic hypertrophy) / SNOMED CT 657366348 / Confirmed  Diabetes mellitus / SNOMED CT 286845089 / Confirmed  Refusal of statin medication by patient / SNOMED CT 023241825 / Confirmed  Resolved: *[email or web address] - Sessile serrated adenomas  Resolved: Tobacco user / ICD-9-.1      Histories   Procedure history:    Cardiac computed tomography for calcium scoring (SNOMED CT 9417907797) on 7/2/2018 at 67 Years.  Comments:  7/5/2018 9:26 AM CDT - Kailee Woodward  Total Agatston calcium score is 393  Colonoscopy (SNOMED CT 317400997) performed by Kolton Majano MD on 11/21/2017 at 67 Years.  Comments:  11/29/2017 3:20 PM CST - Larissa Romano  Sedation:  Fentanyl and Versed  Negative for microscopic colitis  F/u in 5 years  Right hemicolectomy (SNOMED CT 635590893) performed by Nitesh Whitehead MD on 6/26/2014 at 63 Years.  Colonoscopy (SNOMED CT 386853564) performed by Shira Christian MD on 1/27/2009 at 58 Years.  Biopsy of prostate x 3 (SNOMED CT 036673009) in 2008 at 58 Years.  Repair of tendon of upper limb (SNOMED CT 193388572) on 2/13/2002 at 51 Years.  Extraction of wisdom tooth (SNOMED CT 812092818) in 1971  at 21 Years.      Physical Examination   Vital Signs   7/19/2021 2:55 PM CDT Temperature Tympanic 97.6 DegF  LOW    Peripheral Pulse Rate 76 bpm    Pulse Site Radial artery    HR Method Manual    Systolic Blood Pressure 134 mmHg  HI    Diastolic Blood Pressure 76 mmHg    Mean Arterial Pressure 95 mmHg    BP Site Right arm    BP Method Manual      Measurements from flowsheet : Measurements   7/19/2021 2:55 PM CDT Height Measured - Standard 67 in    Weight Measured - Standard 149.5 lb    BSA 1.79 m2    Body Mass Index 23.41 kg/m2      General:  Alert and oriented, No acute distress.    Respiratory:  Lungs are clear to auscultation.    Cardiovascular:  Normal rate, Regular rhythm.    Musculoskeletal:  Normal gait, no spinal tenderness. Normal strength and ROM lower extremities.    Neurologic:  No focal deficits.    Psychiatric:  Appropriate mood & affect.    Neurologic: DTR's symmetric  Skin: no concerning lesions      Review / Management   MRI lumbar spine: Minor disc bulge L2-L3      Impression and Plan   Diagnosis     Back pain (YWN78-NI M54.9).     Back pain: has consult to consider epidural injection  Colon polyp surveillance: scheduled for follow up colonoscopy November 2022 and comfortable waiting until then  History of osteopenia around 2005 and would like follow up    Addendum 8/14: DEXA with spine 2.6 and hip -1.2. Repeat in 5 years

## 2022-02-16 NOTE — LETTER
(Inserted Image. Unable to display)   [address] [place] [address]  November 01, 2021      NOY CRUZ      [address] [place] [address]        Dear NOY,    Thank you for selecting [facility] for your healthcare needs.  Below you will find the results of your recent tests done at our clinic.     Your diabetes test (HgbA1c) is 7.4% with goal of < 8.0% which is stable  Rest of labs are acceptable.      Result Name Current Result Previous Result Reference Range   Cholesterol (mg/dL)  148 10/30/2021  135 6/11/2020  - <200   HDL (mg/dL)  66 10/30/2021  52 6/11/2020 > OR = 40 -    Triglyceride (mg/dL)  77 10/30/2021  130 6/11/2020  - <150   LDL  66 10/30/2021  62 6/11/2020    Ur Microalbumin/Creatinine Ratio  6 10/30/2021   - <30   Iron Level (mcg/dL)  86 10/30/2021  145 6/11/2020 50 - 180   Iron Saturation  23 10/30/2021  43 6/11/2020 20 - 48   Glucose Level (mg/dL) ((H)) 183 10/30/2021 ((H)) 208 6/11/2020 65 - 99   Creatinine Level (mg/dL)  0.91 10/30/2021  0.93 6/11/2020 0.70 - 1.18   Sodium Level (mmol/L)  136 10/30/2021  135 - 146   Potassium Level (mmol/L)  4.7 10/30/2021  3.5 - 5.3   WBC  5.8 10/30/2021  3.8 - 10.8   Hgb (gm/dL)  13.7 10/30/2021  13.2 - 17.1   RDW (%)  11.7 10/30/2021  11.0 - 15.0   PSA (ng/mL)  0.66 10/30/2021  0.4 6/9/2021   0.7 6/11/2020  - < OR = 4.00   Hgb A1c ((H)) 7.4 10/30/2021 ((H)) 7.4 4/6/2021  ((H)) 6.6 9/21/2020  - <5.7       Please contact me or my assistant at [phone] if you have any questions about your results.     Sincerely,        Kolton Majano MD        What do your labs mean?  Below is a glossary of commonly ordered labs:  LDL   Bad Cholesterol   HDL   Good Cholesterol  AST/ALT   Liver Function   Cr/Creatinine   Kidney Function  Microalbumin   Kidney Function  BUN   Kidney Function  PSA   Prostate    TSH   Thyroid Hormone  HgbA1c   Diabetes Test   Hgb (Hemoglobin)   Red Blood Cells

## 2022-02-16 NOTE — TELEPHONE ENCOUNTER
---------------------  From: Bety Neri CMA (eRx Pool ([id number]_[facility]))   To: ASHVIN Tfifany Pool ([id number]_[place] [facility] [place]);     Sent: 10/6/2020 2:33:04 PM CDT  Subject: FW: Medication Management   Due Date/Time: 10/7/2020 8:27:00 AM CDT           ------------------------------------------  From: [facility]  To: Kolton Majano MD  Sent: October 6, 2020 8:27:33 AM CDT  Subject: Medication Management  Due: October 2, 2020 11:08:39 PM CDT     ** On Hold Pending Signature **     Dispensed Drug: modafinil (modafinil 200 mg oral tablet), TAKE 1 TABLET BY MOUTH EVERY MORNING  Quantity: 30 unknown unit  Days Supply: 0  Refills: 0  Substitutions Allowed  Notes from Pharmacy:  ---------------------------------------------------------------  From: Blanca Chowdhury CMA   To: [facility]    Sent: 10/6/2020 3:00:30 PM CDT  Subject: FW: Medication Management     ** Not Approved: Refill not appropriate, refilled 9/23/20 for #30 pt should have enough medication **  modafinil (MODAFINIL    TAB 200MG TABLET)  TAKE 1 TABLET BY MOUTH EVERY MORNING  Qty:  30 unknown unit        Days Supply:  0        Refills:  0          Substitutions Allowed     Route To Pharmacy - [facility]   Signed by Blanca Chowdhury CMA

## 2022-02-16 NOTE — PROGRESS NOTES
Patient:   NOY CRUZ            MRN: [id number]            FIN: [id number]               Age:   68 years     Sex:  Male     :  1950   Associated Diagnoses:   Chronic diarrhea; History of anemia; Diabetes Mellitus; Pruritus, Perianal   Author:   Kolton Majano MD      Visit Information      Date of Service: 2019 02:50 pm  Performing Location: [facility]  Encounter#: [id number]      Primary Care Provider (PCP):  Kolton Majano MD    NPI# [id number]      Referring Provider:  Kolton Majano MD    NPI# [id number]      Chief Complaint   2019 2:54 PM CDT     DM check      History of Present Illness   Started Bromocriptine 2017 for diabetes. No significant side effects.    Chewing rarely. Using nicorrette. Wants to quit and concerned that chewing could have contributed to the colon polyps. Chews copehagen 2-3 cans a week. Chewing for 30 years. Used an herbal supplement and gum. Using nicorette lozenges.    Had hemorrhoids surgically fixed . Continues to have pain and itching intermittently but less frequently. When has rectal pain 1x a week triamcinolone 0.1% cream and lidocaine helps.    Had loose stools 3-4x a day until starting cholestyramine. Now has 1-2 stools usually formed. Still taking lomotil 3 tabs a day. Loose stools started following the right hemicolectomy . Still has gallbladder.    Takes percocet about 8-10 tabs a month for various chronic pains. [facility]  Will be getting a cervical injection  Uses tylenol daily for lower back pain and neck pain    Diabetes diagnosed in .  Everything hurts when walks a mile. Most pain in left achilles since injury .  Weight is stable  Takes modafinil for sleep apnea and [facility] when [facility]  Takes effexor  Prostate 8.5 in  with normal biopsies  [place] jump with injury in neck and has had chronic issues and chronic pain         Review of Systems    Constitutional:  No fever.    Respiratory:  No shortness of breath.    Cardiovascular:  No chest pain.    Gastrointestinal:  No vomiting.    PHQ-9: 7pts (September 2017). 2pts (February 2016)  Eczema in the winter  CAGE: 0pts and minimal alcohol  No longer having black stools      Health Status   Allergies:    Allergic Reactions (Selected)  No known allergies   Medications:  (Selected)   Prescriptions  Prescribed  Atrovent 42 mcg/inh nasal spray: 2 spray(s), nasal, qid, PRN: for nasal congestion, # 1 EA, 2 Refill(s), Type: Maintenance, Pharmacy: [facility], 2 spray(s) nasal qid,PRN:for nasal congestion  Avodart 0.5 mg oral capsule: 1 cap(s) ( 0.5 mg ), PO, Daily, # 30 cap(s), 1 Refill(s), Type: Maintenance, Pharmacy: [facility], 1 cap(s) po daily  Lomotil 2.5 mg-0.025 mg oral tablet: 1 tab(s), po, tid, # 90 tab(s), 5 Refill(s), Type: Maintenance, Pharmacy: [facility] [place], 1 tab(s) Oral tid  Prevalite 4 g/5.5 g oral powder for reconstitution: ( 4 gm ), po, daily, # 90 EA, 3 Refill(s), Type: Maintenance, Pharmacy: [facility], 4 gm po daily  bromocriptine 0.8 mg oral tablet: = 1 tab(s) ( 0.8 mg ), po, qam, # 95 tab(s), 3 Refill(s), Type: Maintenance, Pharmacy: [facility], 1 tab(s) Oral qam  lidocaine 5% topical ointment: 1 nini, top, tid, # 35 gm, 3 Refill(s), Type: Maintenance, Pharmacy: [facility], 1 nini Topical tid  metFORMIN 500 mg oral tablet, extended release: = 4 tab(s), Oral, daily, # 380 tab(s), 3 Refill(s), SHADE, Type: Maintenance, Pharmacy: [facility], Pt has appt on 2/21 will give more refills at that time, 4 tab(s) Oral daily  sildenafil 20 mg oral tablet: = 2.5 tab(s), Oral, daily, Instructions: 30-60 MINUTES PRIOR TO SEXUAL ACTIVITY., PRN: AS NEEDED, # 10 tab(s), 10 Refill(s), SHADE, Type: Maintenance, Pharmacy: [facility], Has appt 2/21 will give more refills at that time, 2.5 tab(s) Oral daily,PRN:A...  triamcinolone 0.1% topical cream: 1 nini, TOP, bid, Instructions: use for no more  than 2wks at a time, # 60 gm, 1 Refill(s), Type: Maintenance, Pharmacy: [facility], 1 nini top bid,Instr:use for no more than 2wks at a time  Documented Medications  Documented  Vitamin C: Vitamin C, See Instructions, Instructions: 1000mg tablet po bid, Supply, 0 Refill(s), Type: Maintenance  acetaminophen-oxycodone 325 mg-5 mg oral tablet: 1 tab(s), po, daily, PRN: as needed for pain, 0 Refill(s), Type: Maintenance  acetaminophen: ( 325 mg ), po, q4 hrs, PRN: as needed for pain, 0 Refill(s), Type: Maintenance  biotin 300 mcg oral tablet: 1 tab(s) ( 300 mcg ), po, daily, 0 Refill(s), Type: Maintenance  ibuprofen 600 mg oral tablet: 1 tab(s) ( 600 mg ), po, daily, PRN: as needed for pain, 0 Refill(s), Type: Maintenance  modafinil 200 mg oral tablet: 1 tab(s) ( 200 mg ), po, qam, Instructions: PRN, 0 Refill(s), Type: Maintenance  venlafaxine 75 mg oral capsule, extended release: 1 cap(s) ( 75 mg ), PO, Daily, # 30 cap(s), 0 Refill(s), Type: Maintenance   Problem list:    All Problems  Colon Polyps / SNOMED CT 644937834 / Confirmed  BPH (benign prostatic hypertrophy) / SNOMED CT 758426326 / Confirmed  Diabetes mellitus / SNOMED CT 051223382 / Confirmed  Refusal of statin medication by patient / SNOMED CT 353231641 / Confirmed  Resolved: *[email or web address]@[facility] - Sessile serrated adenomas  Resolved: Tobacco user / ICD-9-.1      Histories   Procedure history:    Cardiac computed tomography for calcium scoring (SNOMED CT 0447022999) on 7/2/2018 at 67 Years.  Comments:  7/5/2018 9:26 AM CDT - Kailee Woodward  Total Agatston calcium score is 393  Colonoscopy (SNOMED CT 230574721) performed by Kolton Majano MD on 11/21/2017 at 67 Years.  Comments:  11/29/2017 3:20 PM CST - Larissa Romano  Sedation:  Fentanyl and Versed  Negative for microscopic colitis  F/u in 5 years  Right hemicolectomy (SNOMED CT 983877974) performed by Nitesh Whitehead MD on 6/26/2014 at 63 Years.  Colonoscopy (SNOMED CT 938288453) performed by  Shira Christian MD on 2009 at 58 Years.  Biopsy of prostate x 3 (SNOMED CT 416807045) in  at 58 Years.  Repair of tendon of upper limb (SNOMED CT 051813229) on 2002 at 51 Years.  Extraction of wisdom tooth (SNOMED CT 033613555) in  at 21 Years.     Family History: No colon cancer or polyps on Dad side but mom side unknown, Mom  age 93 from stroke/CHF. Dad  heart attack age 65 and had CABG in 50's. Brother is healthy  Social History: Retired [employer]  (33 years). Currently chewing 2-3 cans a week down from 4-5. Single (never ) and no children      Physical Examination   Vital Signs   2019 2:54 PM CDT Temperature Tympanic 97.2 DegF  LOW    Peripheral Pulse Rate 74 bpm    Pulse Site Radial artery    HR Method Manual    Systolic Blood Pressure 122 mmHg    Diastolic Blood Pressure 76 mmHg    Mean Arterial Pressure 91 mmHg    BP Site Right arm    BP Method Manual      Measurements from flowsheet : Measurements   2019 2:54 PM CDT Height Measured - Standard 67 in    Weight Measured - Standard 156.2 lb    BSA 1.83 m2    Body Mass Index 24.46 kg/m2      General:  Alert and oriented, No acute distress.    Neck:  No lymphadenopathy.    Respiratory:  Lungs are clear to auscultation.    Cardiovascular:  Normal rate, Regular rhythm, No edema.    Gastrointestinal:  Soft, Non-tender, Non-distended.    Musculoskeletal:  Normal gait.    Neurologic: normal monofilament testing (2019)         Review / Management   Cardiac Calcium score (2018): 393pts with 77th percentile      Results review:  Lab results: 6/3/2019 10:02 AM CDT    Hgb A1c                   6.9  HI.       Impression and Plan   Diagnosis     Chronic diarrhea (TYS33-LN K52.9).     History of anemia (DWB21-HA Z86.2).     Diabetes Mellitus (FJY64-CR E11.9).     Pruritus, Perianal (LAI40-LU L29.0).       Diabetes: improvement with addition of bromocriptine but now HgbA1c 6.9% (2019). Continue metformin 2000mg daily and  bromocriptine and recheck in August 2019. Discussed and declines statin given diabetes even with low LDL (46). Taking aspirin.    Anal pruritis: lidocaine has worked well. Seldom needs anymore  Diarrhea: continue lomotil 3x daily (February 2019)  Anemia: has corrected with normal iron stores. Will continue to monitor CBC  Colon cancer screening: had right hemicolectomy for large polyps 2014. Normal colonoscopy 2009. Two colonoscopy prior to 2009. Normal colonoscopy November 2017 and repeat in 2022  Asbestosis exposure: [place] in the . CXR 2017 negative and repeat 2020  Eczema in the winter and triamcinolone helps  PSA 0.70-1.42 since 2012 and normal this month  Refilled viagra (declines STD testing), lomotil, metformin, bromocriptine February 2019  Black spot on left great toenail for 6 weeks: likely hematoma. Monitor and refer to Dermatology if not resolving    Reviewed [place] Drug Px Monitoring Program (June 2019)

## 2022-02-16 NOTE — TELEPHONE ENCOUNTER
---------------------  From: Bety Neri CMA (eRx Pool ([facility]224_[facility]))   To: [facility] Message Pool ([id number]_[place] [facility] [place]);     Sent: 12/24/2020 2:35:27 PM CST  Subject: FW: Medication Management   Due Date/Time: 12/25/2020 11:12:00 AM CST           ------------------------------------------  From: [facility]  To: Misael Crow PA-C  Sent: December 24, 2020 11:12:43 AM CST  Subject: Medication Management  Due: December 24, 2020 1:57:35 PM CST     ** On Hold Pending Signature **     Drug: modafinil (modafinil 200 mg oral tablet), TAKE 1 TABLET BY MOUTH EVERY MORNING --[facility]  Quantity: 30 EA  Days Supply: 30  Refills: 0  Substitutions Allowed  Notes from Pharmacy:     Dispensed Drug: modafinil (modafinil 200 mg oral tablet), TAKE 1 TABLET BY MOUTH EVERY MORNING --[facility]  Quantity: 30 EA  Days Supply: 30  Refills: 0  Substitutions Allowed  Notes from Pharmacy:  ---------------------------------------------------------------  From: Blanca Chowdhury CMA ([facility] Message Pool ([id number]))   To: Kolton Majano MD;     Sent: 12/28/2020 8:12:13 AM CST  Subject: FW: Medication Management   Due Date/Time: 12/25/2020 11:12:00 AM CST---------------------  From: Kolton Majano MD   To: Blanca Chowdhury CMA;     Sent: 12/28/2020 8:37:32 AM CST  Subject: RE: Medication Management     One month sent to [facility]

## 2022-02-16 NOTE — NURSING NOTE
Comprehensive Intake Entered On:  2/21/2019 2:33 PM CST    Performed On:  2/21/2019 2:27 PM CST by Blanca Chowdhury CMA               Summary   Chief Complaint :   Medication management   Weight Measured :   157 lb(Converted to: 157 lb 0 oz, 71.21 kg)    Height Measured :   67 in(Converted to: 5 ft 7 in, 170.18 cm)    Body Mass Index :   24.59 kg/m2   Body Surface Area :   1.83 m2   Systolic Blood Pressure :   120 mmHg   Diastolic Blood Pressure :   72 mmHg   Mean Arterial Pressure :   88 mmHg   Peripheral Pulse Rate :   76 bpm   BP Site :   Right arm   Pulse Site :   Radial artery   BP Method :   Manual   HR Method :   Manual   Temperature Tympanic :   97.6 DegF(Converted to: 36.4 DegC)  (LOW)    Blanca Chowdhury CMA - 2/21/2019 2:27 PM CST   Health Status   Allergies Verified? :   Yes   Medication History Verified? :   Yes   Immunizations Current :   No   Medical History Verified? :   No   Pre-Visit Planning Status :   Completed   Tobacco Use? :   Never smoker   Blanca Chowdhury CMA - 2/21/2019 2:27 PM CST   Meds / Allergies   (As Of: 2/21/2019 2:33:38 PM CST)   Allergies (Active)   No known allergies  Estimated Onset Date:   Unspecified ; Created By:   Gena Adan LPN; Reaction Status:   Active ; Category:   Drug ; Substance:   No known allergies ; Type:   Allergy ; Updated By:   Gena Adan LPN; Reviewed Date:   2/21/2019 2:30 PM CST        Medication List   (As Of: 2/21/2019 2:33:38 PM CST)   Prescription/Discharge Order    atropine-diphenoxylate  :   atropine-diphenoxylate ; Status:   Prescribed ; Ordered As Mnemonic:   Lomotil 2.5 mg-0.025 mg oral tablet ; Simple Display Line:   1-2 tab(s), po, qid, 180 tab(s), 5 Refill(s) ; Ordering Provider:   Kolton Majano MD; Catalog Code:   atropine-diphenoxylate ; Order Dt/Tm:   11/14/2018 1:22:31 PM          bromocriptine  :   bromocriptine ; Status:   Prescribed ; Ordered As Mnemonic:   bromocriptine 0.8 mg oral tablet ; Simple Display Line:   0.8 mg, 1 tab(s),  po, qam, 95 tab(s), 3 Refill(s) ; Ordering Provider:   Kolton Majano MD; Catalog Code:   bromocriptine ; Order Dt/Tm:   12/26/2017 2:50:55 PM          cholestyramine  :   cholestyramine ; Status:   Prescribed ; Ordered As Mnemonic:   Prevalite 4 g/5.5 g oral powder for reconstitution ; Simple Display Line:   4 gm, po, daily, 90 EA, 3 Refill(s) ; Ordering Provider:   Kolton Majano MD; Catalog Code:   cholestyramine ; Order Dt/Tm:   9/18/2017 4:44:38 PM          dutasteride  :   dutasteride ; Status:   Prescribed ; Ordered As Mnemonic:   Avodart 0.5 mg oral capsule ; Simple Display Line:   0.5 mg, 1 cap(s), PO, Daily, 30 cap(s), 1 Refill(s) ; Ordering Provider:   Kolton Majano MD; Catalog Code:   dutasteride ; Order Dt/Tm:   8/30/2017 7:29:29 PM          ipratropium nasal  :   ipratropium nasal ; Status:   Prescribed ; Ordered As Mnemonic:   Atrovent 42 mcg/inh nasal spray ; Simple Display Line:   2 spray(s), nasal, qid, PRN: for nasal congestion, 1 EA, 2 Refill(s) ; Ordering Provider:   Misael Crow PA-C; Catalog Code:   ipratropium nasal ; Order Dt/Tm:   5/1/2018 10:02:36 AM          lidocaine topical  :   lidocaine topical ; Status:   Prescribed ; Ordered As Mnemonic:   lidocaine 5% topical ointment ; Simple Display Line:   1 nini, top, tid, 35 gm, 3 Refill(s) ; Ordering Provider:   Kolton Majano MD; Catalog Code:   lidocaine topical ; Order Dt/Tm:   9/18/2017 4:53:02 PM          metFORMIN  :   metFORMIN ; Status:   Prescribed ; Ordered As Mnemonic:   metFORMIN 500 mg oral tablet, extended release ; Simple Display Line:   4 tab(s), Oral, daily, for 40 day(s), 160 tab(s), 0 Refill(s) ; Ordering Provider:   Kolton Majano MD; Catalog Code:   metFORMIN ; Order Dt/Tm:   1/17/2019 1:44:52 PM          sildenafil  :   sildenafil ; Status:   Prescribed ; Ordered As Mnemonic:   sildenafil 20 mg oral tablet ; Simple Display Line:   2.5 tab(s), Oral, daily, 30-60 MINUTES PRIOR TO SEXUAL ACTIVITY., PRN: AS NEEDED,  10 tab(s), 0 Refill(s) ; Ordering Provider:   Kolton Majano MD; Catalog Code:   sildenafil ; Order Dt/Tm:   1/17/2019 1:46:07 PM          triamcinolone topical  :   triamcinolone topical ; Status:   Prescribed ; Ordered As Mnemonic:   triamcinolone 0.1% topical cream ; Simple Display Line:   1 nini, TOP, bid, use for no more than 2wks at a time, 60 gm, 1 Refill(s) ; Ordering Provider:   Kolton Majano MD; Catalog Code:   triamcinolone topical ; Order Dt/Tm:   9/18/2017 4:47:11 PM            Home Meds    acetaminophen  :   acetaminophen ; Status:   Documented ; Ordered As Mnemonic:   acetaminophen ; Simple Display Line:   325 mg, po, q4 hrs, PRN: as needed for pain ; Catalog Code:   acetaminophen ; Order Dt/Tm:   7/1/2014 1:21:25 PM          acetaminophen-oxycodone  :   acetaminophen-oxycodone ; Status:   Documented ; Ordered As Mnemonic:   acetaminophen-oxycodone 325 mg-5 mg oral tablet ; Simple Display Line:   1 tab(s), po, daily, PRN: as needed for pain ; Catalog Code:   acetaminophen-oxycodone ; Order Dt/Tm:   9/5/2012 2:11:41 PM          biotin  :   biotin ; Status:   Documented ; Ordered As Mnemonic:   biotin 300 mcg oral tablet ; Simple Display Line:   300 mcg, 1 tab(s), po, daily ; Catalog Code:   biotin ; Order Dt/Tm:   7/1/2014 1:23:25 PM          ibuprofen  :   ibuprofen ; Status:   Documented ; Ordered As Mnemonic:   ibuprofen 600 mg oral tablet ; Simple Display Line:   600 mg, 1 tab(s), po, daily, PRN: as needed for pain ; Catalog Code:   ibuprofen ; Order Dt/Tm:   9/5/2012 2:09:45 PM          Miscellaneous Prescription  :   Miscellaneous Prescription ; Status:   Documented ; Ordered As Mnemonic:   Vitamin C ; Simple Display Line:   See Instructions, 1000mg tablet po bid ; Catalog Code:   Miscellaneous Prescription ; Order Dt/Tm:   7/1/2014 1:22:31 PM          modafinil  :   modafinil ; Status:   Documented ; Ordered As Mnemonic:   modafinil 200 mg oral tablet ; Simple Display Line:   200 mg, 1 tab(s),  po, qam, PRN ; Catalog Code:   modafinil ; Order Dt/Tm:   2/6/2014 5:07:37 PM          venlafaxine  :   venlafaxine ; Status:   Documented ; Ordered As Mnemonic:   venlafaxine 75 mg oral capsule, extended release ; Simple Display Line:   75 mg, 1 cap(s), PO, Daily, 30 cap(s) ; Catalog Code:   venlafaxine ; Order Dt/Tm:   9/5/2012 2:09:18 PM

## 2022-02-16 NOTE — NURSING NOTE
CAGE Assessment Entered On:  12/19/2019 6:15 PM CST    Performed On:  12/19/2019 6:15 PM CST by Ina Laughlin MA               Assessment   Have you ever felt you should cut down on your drinking :   No   Have people annoyed you by criticizing your drinking :   No   Have you ever felt bad or guilty about your drinking :   No   Have you ever taken a drink first thing in the morning to steady your nerves or get rid of a hangover (Eye-opener) :   No   CAGE Score :   0    Ina Laughlin MA - 12/19/2019 6:15 PM CST

## 2022-02-16 NOTE — PROGRESS NOTES
Patient:   NOY CRUZ            MRN: [id number]            FIN: [id number]               Age:   70 years     Sex:  Male     :  1950   Associated Diagnoses:   Hematospermia   Author:   Meek Serna PA-C      Chief Complaint   2021 9:35 AM CDT     c/o spot of blood in shorts in the AM x 2 weeks ago, blood in sperm two times now        Subjective   Chief complaint 2021 9:35 AM CDT     c/o spot of blood in shorts in the AM x 2 weeks ago, blood in sperm two times now  .     See CC noted and confirmed. Masturbation, infrequent. No pain. New symptom. No fever, chills. No dysuria. On Avodart.       Health Status   Allergies:    Allergic Reactions (Selected)  No known allergies   Medications:  (Selected)   Prescriptions  Prescribed  Atrovent 42 mcg/inh nasal spray: 2 spray(s), nasal, qid, PRN: for nasal congestion, # 1 EA, 2 Refill(s), Type: Maintenance, Pharmacy: [facility], 2 spray(s) nasal qid,PRN:for nasal congestion  Avodart 0.5 mg oral capsule: = 1 cap(s) ( 0.5 mg ), PO, Daily, # 95 cap(s), 3 Refill(s), Type: Maintenance, Pharmacy: [facility], 1 cap(s) Oral daily, 67, in, 20 14:54:00 CDT, Height Measured, 161, lb, 20 14:54:00 CDT, Weight Measured  Lomotil 2.5 mg-0.025 mg oral tablet: 1 tab(s), po, tid, # 90 tab(s), 5 Refill(s), Type: Maintenance, Pharmacy: [facility], [place], 1 tab(s) Oral tid, 67, in, 21 14:26:00 CDT, Height Measured, 163.7, lb, 21 14:26:00 CDT, Weight Measured  Percocet 5/325 oral tablet: 1 tab(s), Oral, q4 hrs, PRN: for pain, # 15 tab(s), 0 Refill(s), Type: Maintenance, Pharmacy: [facility], 1 tab(s) Oral q4 hrs,PRN:for pain, 67, in, 21 14:26:00 CDT, Height Measured, 163.7, lb, 21 14:26:00 CDT, Weight Measured  bromocriptine 0.8 mg oral tablet: = 3 tab(s) ( 2.4 mg ), po, qam, # 280 tab(s), 3 Refill(s), Type: Maintenance, Pharmacy: [facility], 3 tab(s) Oral qam, 67, in, 20 14:54:00 CDT, Height Measured, 161, lb, 20  14:54:00 CDT, Weight Measured  lidocaine 5% topical ointment: 1 nini, top, tid, # 35 gm, 3 Refill(s), Type: Maintenance, Pharmacy: [facility], 1 nini Topical tid  metFORMIN 500 mg oral tablet, extended release: = 4 tab(s), Oral, daily, # 380 tab(s), 3 Refill(s), Type: Maintenance, Pharmacy: [facility], 4 tab(s) Oral daily, 67, in, 04/12/21 14:26:00 CDT, Height Measured, 163.7, lb, 04/12/21 14:26:00 CDT, Weight Measured  modafinil 200 mg oral tablet: = 1 tab(s) ( 200 mg ), po, qam, [facility], # 30 tab(s), 0 Refill(s), Type: Maintenance, Pharmacy: [facility], 1 tab(s) Oral qam,Instr:[facility], 67, in, 04/12/21 14:26:00 CDT, Height Measured...  sildenafil 100 mg oral tablet: = 1 tab(s) ( 100 mg ), Oral, daily, Instructions: 1 hour before sexual activity, # 30 tab(s), 5 Refill(s), Type: Maintenance, Pharmacy: [facility], 1 tab(s) Oral daily,Instr:1 hour before sexual activity, 67, in, 09/23/20 10:31:00 CDT, Height Measur...  sulfamethoxazole-trimethoprim 800 mg-160 mg oral tablet: 1 tab(s), PO, BID, x 21 day(s), # 42 tab(s), 0 Refill(s), Type: Acute, Pharmacy: [facility], 1 tab(s) Oral bid,x21 day(s), 67, in, 06/09/21 9:35:00 CDT, Height Measured, 152.1, lb, 06/09/21 9:35:00 CDT, Weight Measured  triamcinolone 0.1% topical cream: 1 nini, TOP, bid, Instructions: use for no more than 2wks at a time, # 60 gm, 1 Refill(s), Type: Maintenance, Pharmacy: [facility], 1 nini Topical bid,Instr:use for no more than 2wks at a time  venlafaxine 75 mg oral capsule, extended release: = 1 cap(s) ( 75 mg ), PO, Daily, # 95 cap(s), 1 Refill(s), Type: Maintenance, Pharmacy: [facility], 1 cap(s) Oral daily, 67, in, 06/24/20 14:54:00 CDT, Height Measured, 161, lb, 06/24/20 14:54:00 CDT, Weight Measured  Documented Medications  Documented  Vitamin C: Vitamin C, See Instructions, Instructions: 1000mg tablet po bid, Supply, 0 Refill(s), Type:  Maintenance  acetaminophen-oxycodone 325 mg-5 mg oral tablet: 1 tab(s), po, daily, PRN: as needed for pain, 0 Refill(s), Type: Maintenance  acetaminophen: ( 325 mg ), po, q4 hrs, PRN: as needed for pain, 0 Refill(s), Type: Maintenance  biotin 300 mcg oral tablet: 1 tab(s) ( 300 mcg ), po, daily, 0 Refill(s), Type: Maintenance  ibuprofen 600 mg oral tablet: 1 tab(s) ( 600 mg ), po, daily, PRN: as needed for pain, 0 Refill(s), Type: Maintenance,    Medications          *denotes recorded medication          *Vitamin C: See Instructions, 1000mg tablet po bid.          *acetaminophen: 325 mg, po, q4 hrs, PRN: as needed for pain.          *acetaminophen-oxycodone 325 mg-5 mg oral tablet: 1 tab(s), po, daily, PRN: as needed for pain.          Percocet 5/325 oral tablet: 1 tab(s), Oral, q4 hrs, PRN: for pain, 15 tab(s), 0 Refill(s).          Lomotil 2.5 mg-0.025 mg oral tablet: 1 tab(s), po, tid, 90 tab(s), 5 Refill(s).          *biotin 300 mcg oral tablet: 300 mcg, 1 tab(s), po, daily.          bromocriptine 0.8 mg oral tablet: 2.4 mg, 3 tab(s), po, qam, 280 tab(s), 3 Refill(s).          Avodart 0.5 mg oral capsule: 0.5 mg, 1 cap(s), PO, Daily, 95 cap(s), 3 Refill(s).          *ibuprofen 600 mg oral tablet: 600 mg, 1 tab(s), po, daily, PRN: as needed for pain.          Atrovent 42 mcg/inh nasal spray: 2 spray(s), nasal, qid, PRN: for nasal congestion, 1 EA, 2 Refill(s).          lidocaine 5% topical ointment: 1 nini, top, tid, 35 gm, 3 Refill(s).          metFORMIN 500 mg oral tablet, extended release: 4 tab(s), Oral, daily, 380 tab(s), 3 Refill(s).          modafinil 200 mg oral tablet: 200 mg, 1 tab(s), po, qam, [facility], 30 tab(s), 0 Refill(s).          sildenafil 100 mg oral tablet: 100 mg, 1 tab(s), Oral, daily, 1 hour before sexual activity, 30 tab(s), 5 Refill(s).          sulfamethoxazole-trimethoprim 800 mg-160 mg oral tablet: 1 tab(s), PO, BID, for 21 day(s), 42 tab(s), 0 Refill(s).           triamcinolone 0.1% topical cream: 1 nini, TOP, bid, use for no more than 2wks at a time, 60 gm, 1 Refill(s).          venlafaxine 75 mg oral capsule, extended release: 75 mg, 1 cap(s), PO, Daily, 95 cap(s), 1 Refill(s).       Problem list:    All Problems (Selected)  Refusal of statin medication by patient / SNOMED CT 390365300 / Confirmed  Diabetes mellitus / SNOMED CT 656458720 / Confirmed  Colon Polyps / SNOMED CT 042787519 / Confirmed  BPH (benign prostatic hypertrophy) / SNOMED CT 670855417 / Confirmed      Objective   Vital Signs   6/9/2021 9:35 AM CDT Peripheral Pulse Rate 78 bpm    HR Method Electronic    Systolic Blood Pressure 134 mmHg  HI    Diastolic Blood Pressure 69 mmHg    Mean Arterial Pressure 91 mmHg    BP Site Right arm    BP Method Electronic      Measurements from flowsheet : Measurements   6/9/2021 9:35 AM CDT Height Measured - Standard 67 in    Weight Measured - Standard 152.1 lb    BSA 1.8 m2    Body Mass Index 23.82 kg/m2      Respiratory:  Lungs are clear to auscultation, Respirations are non-labored.    Cardiovascular:  Normal rate, Regular rhythm.    Gastrointestinal:  Soft, Non-tender, Non-distended, Normal bowel sounds.    Genitourinary:  No costovertebral angle tenderness, No scrotal tenderness, No inguinal tenderness.         Testes: Right testes atrophied due to history of mumps. Prostate smooth, firm and non tender.       Results Review   Results review   Lab results   4/6/2021 1:28 PM CDT Hgb A1c 7.4  HI    Lyme Ab IgG/IgM West Blot <0.90         Impression and Plan   Assessment and Plan:          Diagnosis: Hematospermia (CFN41-KN R36.1).    Orders     Orders (Selected)   Outpatient Orders  Ordered  Urinalysis (Request): Priority: STAT, Hematospermia  Ordered (Dispatched)  Culture, Urine, Routine* ([facility]): Specimen Type: Urine (Clean Catch), Collection Date: 06/09/21 9:50:00 CDT  PSA, Total (Room)* ([facility]): Specimen Type: Serum, Collection Date: 06/09/21 9:50:00  CDT  Prescriptions  Prescribed  sulfamethoxazole-trimethoprim 800 mg-160 mg oral tablet: 1 tab(s), PO, BID, x 21 day(s), # 42 tab(s), 0 Refill(s), Type: Acute, Pharmacy: [facility], 1 tab(s) Oral bid,x21 day(s), 67, in, 06/09/21 9:35:00 CDT, Height Measured, 152.1, lb, 06/09/21 9:35:00 CDT, Weight Measured.     Offered Urology, he declines for now. Let us know if symptoms persist..     .

## 2022-02-16 NOTE — NURSING NOTE
Diabetes Eye Testing Entered On:  11/10/2020 10:52 AM CST    Performed On:  11/4/2020 10:52 AM CST by Tahmina Pineda               Diabetes Eye Testing   Retinopathy Present TR :   Tahmina Aquino - 11/10/2020 10:52 AM CST

## 2022-02-16 NOTE — TELEPHONE ENCOUNTER
---------------------  From: Lakisha Garcia LPN   To: [facility] Message Pool ([id number] - [place]);     Sent: 2/14/2019 3:34:13 PM CST  Subject: labs     previsit planning for 2- lab appointment . going to order BMP, A1C, Lipid, and DALY.    would you like the PSA and Iron studies repeated as well?     please advisefound RTC, will add CBC no diff, PSA and Iron studies

## 2022-02-16 NOTE — TELEPHONE ENCOUNTER
---------------------  From: Bety Neri CMA (eRx Pool ([id number]_[facility]))   To: [facility] Message Pool ([id number] - [place]);     Sent: 12/9/2020 1:36:51 PM CST  Subject: FW: Medication Management   Due Date/Time: 12/10/2020 8:24:00 AM CST           ------------------------------------------  From: [employer]  To: Kolton Majano MD  Sent: December 9, 2020 8:24:41 AM CST  Subject: Medication Management  Due: December 8, 2020 10:07:06 AM CST     ** On Hold Pending Signature **     Drug: atropine-diphenoxylate (Lomotil 2.5 mg-0.025 mg oral tablet), TAKE ONE TABLET BY MOUTH THREE TIMES A DAY  Quantity: 90 EA  Days Supply: 30  Refills: 5  Substitutions Allowed  Notes from Pharmacy:     Dispensed Drug: atropine-diphenoxylate (atropine-diphenoxylate 0.025 mg-2.5 mg oral tablet), TAKE ONE TABLET BY MOUTH THREE TIMES A DAY  Quantity: 90 EA  Days Supply: 30  Refills: 5  Substitutions Allowed  Notes from Pharmacy:  ---------------------------------------------------------------  From: Blanca Chowdhury CMA ([facility] Message Pool ([id number]))   To: Kolton Majano MD;     Sent: 12/9/2020 2:36:54 PM CST  Subject: FW: Medication Management   Due Date/Time: 12/10/2020 8:24:00 AM CST---------------------  From: Kolton Majano MD   To: Blanca Chowdhury CMA;     Sent: 12/9/2020 3:06:21 PM CST  Subject: RE: Medication Management     Refills sent

## 2022-02-16 NOTE — PROGRESS NOTES
Patient:   NOY CRUZ            MRN: [id number]            FIN: [id number]               Age:   66 years     Sex:  Male     :  1950   Associated Diagnoses:   Chronic diarrhea; History of anemia; Diabetes Mellitus; Pruritus, Perianal   Author:   Kolton Majano MD      Visit Information      Date of Service: 2017 01:56 pm  Performing Location: [facility]  Encounter#: [id number]      Primary Care Provider (PCP):  Kolton Majano MD    NPI# [id number]      Referring Provider:  No referring provider recorded for selected visit.      Chief Complaint   2017 2:32 PM CST     Has ongoing bowel  problems since surgery in . Requests PSA and due for DM labs      History of Present Illness   Tried quitting chew on own. Tried nicorrette. Wants to quit and concerned that chewing could have contributed to the colon polyps. Chews copehagen three cans a week. Chewing for 30 years. Used an herbal supplement and gum. Using nicorette lozenges.    Had hemorrhoids surgically fixed . Continues to have pain and itching intermittently but less frequently. When has rectal pain 1x a week triamcinolone 0.1% cream and lidocaine helps.  Takes Metformin ER 500mg in AM and 1000mg PM. Discussed statin even though cholesterol well controlled given recent guidelines.    Stopped aspirin in April but still having black stools about two days a week. Takes iron.  Had loose stools 3-4x a day until starting cholestyramine. Now has 1-2 stools and formed. Still taking lomotil 4 tabs a day (down from 6x a day). Loose stools started following the right hemicolectomy . Still has gallbladder.    Takes percocet about 30 tabs in 3 months for various chronic pains. [facility]  Uses ibuprofen 3-4x a week.    Diabetes diagnosed in .      Review of Systems   Constitutional:  No fever.    Respiratory:  No shortness of breath.    Cardiovascular:  No chest pain.    Gastrointestinal:  No  vomiting.    PHQ-9: 2pts (February 2016)  Eczema in the winter      Health Status   Allergies:    Allergic Reactions (Selected)  No known allergies   Medications:  (Selected)   Prescriptions  Prescribed  Lomotil 2.5 mg-0.025 mg oral tablet: 1-2 tab(s), po, qid, # 120 tab(s), 5 Refill(s), Type: Maintenance, called to pharmacy (Rx), [place]  Prevalite 4 g/5.5 g oral powder for reconstitution: See Instructions, Instructions: MIX 1 SCOOPFUL IN FLUID AND DRINK TWICE DAILY., # 231 gm, 1 Refill(s), Type: Maintenance, Pharmacy: [facility]  Viagra 100 mg oral tablet: 0.5 tab(s) ( 50 mg ), po, daily, Instructions: take when needed., # 10 tab(s), 0 Refill(s), Type: Maintenance, Pharmacy: [facility], Please inform pt that he is overdue for 6month f/u w/ [facility]--Thanks., 0.5 tab(s) po daily,Instr:take when needed.  lidocaine 5% topical ointment: 1 nini, top, tid, # 35 gm, 3 Refill(s), Type: Maintenance, Pharmacy: [facility], 1 nini top tid  metFORMIN 500 mg oral tablet, extended release: 3 tab(s) ( 1,500 mg ), po, daily, # 280 tab(s), 3 Refill(s), Type: Maintenance, Pharmacy: [facility], Pt is overdue for DM check and labs--must be seen for further refills., 3 tab(s) po daily  triamcinolone 0.1% topical cream: 1 nini, TOP, bid, Instructions: use for no more than 2wks at a time, # 60 gm, 1 Refill(s), Type: Maintenance, Pharmacy: [facility], 1 nini top bid,Instr:use for no more than 2wks at a time  Documented Medications  Documented  Avodart 0.5 mg oral capsule: 1 cap(s) ( 0.5 mg ), PO, Daily, # 30 cap(s), 0 Refill(s), Type: Maintenance  Vitamin C: Vitamin C, See Instructions, Instructions: 1000mg tablet po bid, Supply, 0 Refill(s), Type: Maintenance  acetaminophen-oxycodone 325 mg-5 mg oral tablet: 1 tab(s), po, daily, PRN: as needed for pain, 0 Refill(s), Type: Maintenance  acetaminophen: ( 325 mg ), po, q4 hrs, PRN: as needed for pain, 0 Refill(s), Type:  Maintenance  biotin 300 mcg oral tablet: 1 tab(s) ( 300 mcg ), po, daily, 0 Refill(s), Type: Maintenance  ibuprofen 600 mg oral tablet: 1 tab(s) ( 600 mg ), po, daily, PRN: as needed for pain, 0 Refill(s), Type: Maintenance  modafinil 200 mg oral tablet: 1 tab(s) ( 200 mg ), po, qam, Instructions: PRN, 0 Refill(s), Type: Maintenance  venlafaxine 75 mg oral capsule, extended release: 1 cap(s) ( 75 mg ), PO, Daily, # 30 cap(s), 0 Refill(s), Type: Maintenance   Problem list:    All Problems  Colon Polyps / SNOMED CT 902554871 / Confirmed  BPH / ICD-9-.00 / Confirmed  Diabetes Mellitus / ICD-9-.00 / Confirmed  Tobacco user / ICD-9-.1 / Confirmed  Resolved: *[email or web address]@[facility] - Sessile serrated adenomas      Histories   Family History: No colon cancer or polyps on Dad side but mom side unknown, Mom  age 93 from stroke/CHF; Dad  heart attack age 65 and had CABG in 50's; Brother is healthy   Procedure history:    Right hemicolectomy (SNOMED CT 269220499) performed by Nitesh Whitehead MD on 2014 at 63 Years.  Colonoscopy (SNOMED CT 130500905) performed by Shira Christian MD on 2009 at 58 Years.  Biopsy of prostate x 3 (SNOMED CT 357631015) in  at 58 Years.  Repair of tendon of upper limb (SNOMED CT 718148620) on 2002 at 51 Years.  Extraction of wisdom tooth (SNOMED CT 507967641) in  at 21 Years.   Social History: Ret[employer]. Currently chewing 3 cans a week down from 4-5      Physical Examination   Vital Signs   2017 2:32 PM CST Temperature Tympanic 97.2 DegF  LOW    Peripheral Pulse Rate 80 bpm    Systolic Blood Pressure 116 mmHg    Diastolic Blood Pressure 74 mmHg      General:  Alert and oriented, No acute distress.    Neck:  No lymphadenopathy.    Respiratory:  Lungs are clear to auscultation.    Cardiovascular:  Normal rate, Regular rhythm, No edema.    Gastrointestinal:  Soft, Non-tender, Non-distended.    Musculoskeletal:  Normal gait.    Neurologic:  No focal  deficits, normal monofilament testing.       Impression and Plan   Diagnosis     Chronic diarrhea (RFA69-XP K52.9).     History of anemia (CVZ54-YF Z86.2).     Diabetes Mellitus (RQT80-HC E11.9).     Pruritus, Perianal (OZJ98-UH L29.0).       Diabetes: continue metformin with. HgbA1c 6.5%. Discussed statin given diabetes even with low LDL (declines for now). Trying nicotine gum. Stopped aspirin.  Anal pruritis: lidocaine has worked well and refilled. Uses 1-2x a week  Diarrhea: continue cholestyramine once daily (consider decrease to half packet a day) and taper the lomotil  Anemia: has corrected with normal iron stores. He stopped daily aspirin as may have been some gastritis. Will monitor CBC  Colon cancer screening: had right hemicolectomy for large polyps. Recommend repeat June 2017  Asbestosis exposure: [place] in the . Will get CXR  Eczema in the winter and triamcinolone helps    PSA 0.70-1.42 since 2012    Reviewed [place] [facility] Monitoring Program February 2017)

## 2022-02-16 NOTE — LETTER
(Inserted Image. Unable to display)   June 04, 2019      NOY CRUZ      [address] [place], [place] [id number]        Dear NOY,    Thank you for selecting [facility] for your healthcare needs.  Below you will find the results of your recent tests done at our clinic.     Your diabetes test (HgbA1c) is 6.9% with goal of < 8.0%      Result Name Current Result Previous Result Reference Range   Hgb A1c ((H)) 6.9 6/3/2019 ((H)) 7.8 2/18/2019  ((H)) 6.1 12/21/2017  - <5.7       Please contact me or my assistant at [phone] if you have any questions about your results.     Sincerely,        Kolton Majano MD        What do your labs mean?  Below is a glossary of commonly ordered labs:  LDL   Bad Cholesterol   HDL   Good Cholesterol  AST/ALT   Liver Function   Cr/Creatinine   Kidney Function  Microalbumin   Kidney Function  BUN   Kidney Function  PSA   Prostate    TSH   Thyroid Hormone  HgbA1c   Diabetes Test   Hgb (Hemoglobin)   Red Blood Cells

## 2022-02-16 NOTE — PROGRESS NOTES
Chief Complaint    c/o back pain/inflammation at L4-L5.  had x-rays at [place] last Friday, were to be sent here.  History of Present Illness       Patient is a 70-year-old male comes in complaining of low back pain.       He had a severe injury in the [employer] and has back pain off and on for the last 15 to 18 years.  This episode of back pain started last Wednesday, July 7.  2 days later, Friday, July 9, he was evaluated at [facility] orthopedics by Mckenna PECHUMER, CNP.  He reports that they did an x-ray which showed inflammation at the L4-L5 level.  He was placed on a prednisone taper of which he has 2 days left and taking acetaminophen 2 tabs every 4-6 hours.  He does not feel either 1 of these things has helped with his pain at all.  He has had Percocet in the past and he had a few tablets left from a recent prescription.  That is helping a little bit but he is almost out.  He would like a refill.       He denies any new or worrisome symptoms with this current episode of his back pain.  He has no bowel or bladder incontinence.  No fevers.  No numbness or tingling in his extremities.  Review of Systems       Negative except as listed in HPI  Physical Exam   Vitals & Measurements    T: 97.4  F (Tympanic)  HR: 73 (Peripheral)  BP: 130/75  SpO2: 97%     HT: 67 in  WT: 159.9 lb  BMI: 25.04        Vitals noted and within normal limits.       In general he is alert, oriented, and in no acute distress       Patient sits very straight in his chair and moves slowly from sitting to standing.        He walks with a normal gait.  Assessment/Plan       Back pain (M54.9)          Reviewed the [facility]         Prescribed Percocet to be used 1 tablet every 4 hours as needed.  Patient is familiar with the side effects of this medication.  He has a plan to follow-up with orthopedics if he is not doing better and they will be considering a joint injection to his spine.       Orders:         acetaminophen-oxycodone, 1  tab(s), Oral, q4 hrs, PRN: for pain, # 15 tab(s), 0 Refill(s), Type: Maintenance, Pharmacy: [facility], 1 tab(s) Oral q4 hrs,PRN:for pain, 67, in, 07/12/21 12:29:00 CDT, Height Measured, 159.9, lb, 07/12/21 12:29:00 CDT, Weight Measured, (Ordered)         acetaminophen-oxycodone, 1 tab(s), Oral, q4 hrs, PRN: for pain, # 15 tab(s), 0 Refill(s), Type: Hard Stop, Pharmacy: [facility], 67, in, 06/09/21 9:35:00 CDT, Height Measured, 152.1, lb, 06/09/21 9:35:00 CDT, Weight Measured, (Completed)  Patient Information     Name:NOY CRUZ      Address:      [address] [place] [id number]     Sex:Male     YOB: 1950     [phone]     Emergency Contact:[facility], CONTACT     MRN:[id number]     FIN:[id number]     Location:[facility]     Date of Service:07/12/2021      Primary Care Physician:       Kolton Majano MD, [phone]      Attending Physician:       Radha Boateng MD, [phone]  Problem List/Past Medical History    Ongoing     BPH (benign prostatic hypertrophy)     Colon Polyps     Diabetes mellitus     Refusal of statin medication by patient       Comments: Per 9/21/2017 provider note    Historical     *Hospitalized@[facility] - Sessile serrated adenomas     Tobacco user  Procedure/Surgical History     Cardiac computed tomography for calcium scoring (07/02/2018)            Comments: Total Agatston calcium score is 393.     Colonoscopy (11/21/2017)            Comments: Sedation:  Fentanyl and Versed      Negative for microscopic colitis      F/u in 5 years.     Right hemicolectomy (06/26/2014)           Colonoscopy (01/27/2009)           Biopsy of prostate x 3 (2008)           Repair of tendon of upper limb (02/13/2002)           Extraction of wisdom tooth (1971)        Medications    acetaminophen, 325 mg, Oral, q4 hrs, PRN    acetaminophen-oxycodone 325 mg-5 mg oral tablet, 1 tab(s), Oral, daily, PRN    Atrovent 42 mcg/inh nasal spray, 2 spray(s), Nasal,  qid, PRN, 2 refills    biotin 300 mcg oral tablet, 300 mcg= 1 tab(s), Oral, daily    Cycloset 0.8 mg oral tablet, 3 tab(s), Oral, qam    dutasteride 0.5 mg oral capsule, 1 cap(s), Oral, daily    ibuprofen 600 mg oral tablet, 600 mg= 1 tab(s), Oral, daily, PRN    lidocaine 5% topical ointment, 1 nini, Topical, tid, 3 refills    Lomotil 2.5 mg-0.025 mg oral tablet, 1 tab(s), Oral, tid, 5 refills    metFORMIN 500 mg oral tablet, extended release, 4 tab(s), Oral, daily, 3 refills    modafinil 200 mg oral tablet, 200 mg= 1 tab(s), Oral, qam    Percocet 5/325 oral tablet, 1 tab(s), Oral, q4 hrs, PRN    sildenafil 100 mg oral tablet, 100 mg= 1 tab(s), Oral, daily, 5 refills    triamcinolone 0.1% topical cream, 1 nini, Topical, bid, 1 refills    venlafaxine 75 mg oral capsule, extended release, 75 mg= 1 cap(s), Oral, daily, 1 refills    Vitamin C, See Instructions  Allergies    No known allergies  Social History    Smoking Status     Unknown if ever smoked     Alcohol      Current, Beer (12 oz), 1-2 times per month, 2 drinks/episode average.     Electronic Cigarette/Vaping      Electronic Cigarette Use: Never.     Employment/School      Retired, Work/School description: Lt. Col., [employer].     Exercise      Exercise frequency: 1-2 times/week. Exercise type: Walking, Weight lifting.     Sexual      Sexually active: No. Sexual orientation: Heterosexual.     Substance Abuse      Never     Tobacco - Past      some day chewing  Family History    CABG - Coronary artery bypass graft: Father.    Heart failure: Mother.    Stroke: Mother.    Brother: History is negative  Lab Results       Lab Results (Last 4 results within 90 days)        PSA: 0.4 ng/mL (06/09/21 09:56:00)       Urine Culture: See comment (06/09/21 09:56:00)  Immunizations       Scheduled Immunizations       Dose Date(s)       tetanus/diphth/pertuss (Tdap) adult/adol       01/02/2016       Other Immunizations               influenza virus vaccine, inactivated        03/12/2020

## 2022-02-16 NOTE — PROGRESS NOTES
Patient:   NOY CRUZ            MRN: [id number]            FIN: [id number]               Age:   68 years     Sex:  Male     :  1950   Associated Diagnoses:   Rash   Author:   Kolton Majano MD      Visit Information      Date of Service: 10/03/2018 02:45 pm  Performing Location: [facility]  Encounter#: [id number]      Primary Care Provider (PCP):  Kolton Majano MD    NPI# [id number]      Referring Provider:  Kolton Majano MD    NPI# [id number]      Chief Complaint   10/3/2018 2:52 PM CDT    c/o rash on left hip/side area, boil above tailbone        History of Present Illness   Rash started last Thursday. Has burning with it. Has some itching. Symptoms overall improving.      Review of Systems   Constitutional:  No fever.    Gastrointestinal:  No vomiting.       Health Status   Allergies:    Allergic Reactions (Selected)  No known allergies   Medications:  (Selected)   Prescriptions  Prescribed  Atrovent 42 mcg/inh nasal spray: 2 spray(s), nasal, qid, PRN: for nasal congestion, # 1 EA, 2 Refill(s), Type: Maintenance, Pharmacy: [facility], 2 spray(s) nasal qid,PRN:for nasal congestion  Avodart 0.5 mg oral capsule: 1 cap(s) ( 0.5 mg ), PO, Daily, # 30 cap(s), 1 Refill(s), Type: Maintenance, Pharmacy: [facility], 1 cap(s) po daily  Lomotil 2.5 mg-0.025 mg oral tablet: 1-2 tab(s), po, qid, # 180 tab(s), 5 Refill(s), Type: Maintenance, Pharmacy: [facility] [place], 1-2 tab(s) Oral qid  Prevalite 4 g/5.5 g oral powder for reconstitution: ( 4 gm ), po, daily, # 90 EA, 3 Refill(s), Type: Maintenance, Pharmacy: [facility], 4 gm po daily  Viagra 100 mg oral tablet: 0.5 tab(s) ( 50 mg ), po, daily, Instructions: take when needed., # 10 tab(s), 3 Refill(s), Type: Maintenance, Pharmacy: [facility], 0.5 tab(s) po daily,Instr:take when needed.  bromocriptine 0.8 mg oral tablet: 1 tab(s) ( 0.8 mg ), po, qam, # 95 tab(s), 3 Refill(s), Type: Maintenance,  Pharmacy: [facility], 1 tab(s) po qam  lidocaine 5% topical ointment: 1 nini, top, tid, # 35 gm, 3 Refill(s), Type: Maintenance, Pharmacy: [facility], 1 nini top tid  metFORMIN 500 mg oral tablet, extended release: See Instructions, Instructions: TAKE FOUR TABLETS BY MOUTH ONCE DAILY, # 370 unknown unit, Type: Soft Stop, Pharmacy: [facility]  triamcinolone 0.1% topical cream: 1 nini, TOP, bid, Instructions: use for no more than 2wks at a time, # 60 gm, 1 Refill(s), Type: Maintenance, Pharmacy: [facility], 1 nini top bid,Instr:use for no more than 2wks at a time  Documented Medications  Documented  Vitamin C: Vitamin C, See Instructions, Instructions: 1000mg tablet po bid, Supply, 0 Refill(s), Type: Maintenance  acetaminophen-oxycodone 325 mg-5 mg oral tablet: 1 tab(s), po, daily, PRN: as needed for pain, 0 Refill(s), Type: Maintenance  acetaminophen: ( 325 mg ), po, q4 hrs, PRN: as needed for pain, 0 Refill(s), Type: Maintenance  biotin 300 mcg oral tablet: 1 tab(s) ( 300 mcg ), po, daily, 0 Refill(s), Type: Maintenance  ibuprofen 600 mg oral tablet: 1 tab(s) ( 600 mg ), po, daily, PRN: as needed for pain, 0 Refill(s), Type: Maintenance  modafinil 200 mg oral tablet: 1 tab(s) ( 200 mg ), po, qam, Instructions: PRN, 0 Refill(s), Type: Maintenance  venlafaxine 75 mg oral capsule, extended release: 1 cap(s) ( 75 mg ), PO, Daily, # 30 cap(s), 0 Refill(s), Type: Maintenance   Problem list:    All Problems  Colon Polyps / SNOMED CT 971640344 / Confirmed  BPH (benign prostatic hypertrophy) / SNOMED CT 076415918 / Confirmed  Diabetes mellitus / SNOMED CT 070927229 / Confirmed  Refusal of statin medication by patient / SNOMED CT 298036376 / Confirmed  Resolved: *Hospitalized@[facility] - Sessile serrated adenomas  Resolved: Tobacco user / ICD-9-.1      Physical Examination   Vital Signs   10/3/2018 2:52 PM CDT Temperature Tympanic 97.6 DegF  LOW    Peripheral Pulse Rate 72 bpm    Pulse Site Radial artery    HR Method  Manual    Systolic Blood Pressure 120 mmHg    Diastolic Blood Pressure 74 mmHg    Mean Arterial Pressure 89 mmHg    BP Site Right arm    BP Method Manual      Measurements from flowsheet : Measurements   10/3/2018 2:52 PM CDT Height Measured - Standard 67 in    Weight Measured - Standard 152.2 lb    BSA 1.8 m2    Body Mass Index 23.84 kg/m2      General:  Alert and oriented, No acute distress.    HENT:  No pharyngeal erythema.    Neck:  No lymphadenopathy.    Respiratory:  Lungs are clear to auscultation.    Cardiovascular:  Normal rate, Regular rhythm.    Musculoskeletal:  Normal gait.    Skin: crusted lesions on left buttock and left groin      Impression and Plan   Diagnosis     Rash ([id number]).     Rash: likely shingles. Do not feel will benefit from antivirals given 6 days from start of rash. Monitor and follow up if not resolving.

## 2022-02-16 NOTE — NURSING NOTE
Comprehensive Intake Entered On:  2/12/2021 12:23 PM CST    Performed On:  2/12/2021 12:21 PM CST by Elsie Sellers CMA               Summary   Chief Complaint :   Verbal consent given for telephone visit. Sx worsening since last visit on 2/10. Now SOB and fever. Laryngitis. No known COVID exposure. Never tested in past.    Elsie Sellers CMA - 2/12/2021 12:21 PM CST   Health Status   Allergies Verified? :   Yes   Medication History Verified? :   Yes   Immunizations Current :   No   Pre-Visit Planning Status :   Not completed   Elsie Sellers CMA - 2/12/2021 12:21 PM CST   Meds / Allergies   (As Of: 2/12/2021 12:23:13 PM CST)   Allergies (Active)   No known allergies  Estimated Onset Date:   Unspecified ; Created By:   Gena Adan LPN; Reaction Status:   Active ; Category:   Drug ; Substance:   No known allergies ; Type:   Allergy ; Updated By:   Gena Adan LPN; Reviewed Date:   2/10/2021 3:10 PM CST        Medication List   (As Of: 2/12/2021 12:23:13 PM CST)   Prescription/Discharge Order    venlafaxine  :   venlafaxine ; Status:   Prescribed ; Ordered As Mnemonic:   venlafaxine 75 mg oral capsule, extended release ; Simple Display Line:   75 mg, 1 cap(s), PO, Daily, 95 cap(s), 1 Refill(s) ; Ordering Provider:   Kolton Majano MD; Catalog Code:   venlafaxine ; Order Dt/Tm:   6/30/2020 6:20:30 AM CDT          triamcinolone topical  :   triamcinolone topical ; Status:   Prescribed ; Ordered As Mnemonic:   triamcinolone 0.1% topical cream ; Simple Display Line:   1 nini, TOP, bid, use for no more than 2wks at a time, 60 gm, 1 Refill(s) ; Ordering Provider:   Kolton Majano MD; Catalog Code:   triamcinolone topical ; Order Dt/Tm:   12/19/2019 3:36:42 PM CST          sildenafil  :   sildenafil ; Status:   Prescribed ; Ordered As Mnemonic:   sildenafil 100 mg oral tablet ; Simple Display Line:   100 mg, 1 tab(s), Oral, daily, 1 hour before sexual activity, 30 tab(s), 5 Refill(s) ; Ordering Provider:   Melecio  Kolton JOHNSON; Catalog Code:   sildenafil ; Order Dt/Tm:   9/23/2020 10:54:21 AM CDT          modafinil  :   modafinil ; Status:   Prescribed ; Ordered As Mnemonic:   modafinil 200 mg oral tablet ; Simple Display Line:   200 mg, 1 tab(s), po, qam, [facility], 30 tab(s), 0 Refill(s) ; Ordering Provider:   Kolton Majano MD; Catalog Code:   modafinil ; Order Dt/Tm:   12/28/2020 8:36:19 AM CST          metFORMIN  :   metFORMIN ; Status:   Prescribed ; Ordered As Mnemonic:   metFORMIN 500 mg oral tablet, extended release ; Simple Display Line:   4 tab(s), Oral, daily, 380 tab(s), 3 Refill(s) ; Ordering Provider:   Kolton Majano MD; Catalog Code:   metFORMIN ; Order Dt/Tm:   6/24/2020 3:18:49 PM CDT          lidocaine topical  :   lidocaine topical ; Status:   Prescribed ; Ordered As Mnemonic:   lidocaine 5% topical ointment ; Simple Display Line:   1 nini, top, tid, 35 gm, 3 Refill(s) ; Ordering Provider:   Kolton Majano MD; Catalog Code:   lidocaine topical ; Order Dt/Tm:   12/19/2019 3:27:49 PM CST          ipratropium nasal  :   ipratropium nasal ; Status:   Prescribed ; Ordered As Mnemonic:   Atrovent 42 mcg/inh nasal spray ; Simple Display Line:   2 spray(s), nasal, qid, PRN: for nasal congestion, 1 EA, 2 Refill(s) ; Ordering Provider:   Misael Crow PA-C; Catalog Code:   ipratropium nasal ; Order Dt/Tm:   5/1/2018 10:02:36 AM CDT          hydrocortisone/neomycin/polymyxin B otic  :   hydrocortisone/neomycin/polymyxin B otic ; Status:   Prescribed ; Ordered As Mnemonic:   hydrocortisone/neomycin/polymyxin B 1%-0.35%-10,000 units/mL otic suspension ; Simple Display Line:   4 drop(s), Otic, tid, for 7 day(s), S., 10 mL, 0 Refill(s) ; Ordering Provider:   Misael Crow PA-C; Catalog Code:   hydrocortisone/neomycin/polymyxin B otic ; Order Dt/Tm:   2/11/2021 11:43:38 AM CST          dutasteride  :   dutasteride ; Status:   Prescribed ; Ordered As Mnemonic:   Avodart 0.5 mg oral capsule ;  Simple Display Line:   0.5 mg, 1 cap(s), PO, Daily, 95 cap(s), 3 Refill(s) ; Ordering Provider:   Kolton Majano MD; Catalog Code:   dutasteride ; Order Dt/Tm:   6/24/2020 3:10:42 PM CDT          bromocriptine  :   bromocriptine ; Status:   Prescribed ; Ordered As Mnemonic:   bromocriptine 0.8 mg oral tablet ; Simple Display Line:   2.4 mg, 3 tab(s), po, qam, 280 tab(s), 3 Refill(s) ; Ordering Provider:   Kolton Majano MD; Catalog Code:   bromocriptine ; Order Dt/Tm:   6/24/2020 3:09:42 PM CDT          benzonatate  :   benzonatate ; Status:   Prescribed ; Ordered As Mnemonic:   benzonatate 200 mg oral capsule ; Simple Display Line:   200 mg, 1 cap(s), Oral, q8 hrs, for 10 day(s), 30 cap(s), 0 Refill(s) ; Ordering Provider:   Misael Crow PA-C; Catalog Code:   benzonatate ; Order Dt/Tm:   2/10/2021 3:18:52 PM CST          atropine-diphenoxylate  :   atropine-diphenoxylate ; Status:   Prescribed ; Ordered As Mnemonic:   Lomotil 2.5 mg-0.025 mg oral tablet ; Simple Display Line:   1 tab(s), po, tid, 90 tab(s), 5 Refill(s) ; Ordering Provider:   Kolton Majano MD; Catalog Code:   atropine-diphenoxylate ; Order Dt/Tm:   12/9/2020 3:05:34 PM CST            Home Meds    Miscellaneous Prescription  :   Miscellaneous Prescription ; Status:   Documented ; Ordered As Mnemonic:   Vitamin C ; Simple Display Line:   See Instructions, 1000mg tablet po bid ; Catalog Code:   Miscellaneous Prescription ; Order Dt/Tm:   7/1/2014 1:22:31 PM CDT          ibuprofen  :   ibuprofen ; Status:   Documented ; Ordered As Mnemonic:   ibuprofen 600 mg oral tablet ; Simple Display Line:   600 mg, 1 tab(s), po, daily, PRN: as needed for pain ; Catalog Code:   ibuprofen ; Order Dt/Tm:   9/5/2012 2:09:45 PM CDT          biotin  :   biotin ; Status:   Documented ; Ordered As Mnemonic:   biotin 300 mcg oral tablet ; Simple Display Line:   300 mcg, 1 tab(s), po, daily ; Catalog Code:   biotin ; Order Dt/Tm:   7/1/2014 1:23:25 PM CDT           acetaminophen-oxycodone  :   acetaminophen-oxycodone ; Status:   Documented ; Ordered As Mnemonic:   acetaminophen-oxycodone 325 mg-5 mg oral tablet ; Simple Display Line:   1 tab(s), po, daily, PRN: as needed for pain ; Catalog Code:   acetaminophen-oxycodone ; Order Dt/Tm:   9/5/2012 2:11:41 PM CDT          acetaminophen  :   acetaminophen ; Status:   Documented ; Ordered As Mnemonic:   acetaminophen ; Simple Display Line:   325 mg, po, q4 hrs, PRN: as needed for pain ; Catalog Code:   acetaminophen ; Order Dt/Tm:   7/1/2014 1:21:25 PM CDT            ID Risk Screen   Recent Travel History :   No recent travel   Family Member Travel History :   No recent travel   Other Exposure to Infectious Disease :   Unknown   COVID-19 Testing Status :   No COVID-19 test performed   Elsie Sellers CMA - 2/12/2021 12:21 PM CST

## 2022-02-16 NOTE — LETTER
(Inserted Image. Unable to display)   November 20, 2019        NOY CRUZ  [address] [place], [place] [id number]        Dear NOY,      Thank you for selecting [facility] for your healthcare needs.    Our records indicate you are due for the following services:     Non-Fasting Labs    If you had your labs done at another facility or with Direct Access Lab Testing at [facility], please bring in a copy of the results to your next visit, mail a copy, or drop off a copy of your results to your Healthcare Provider.    To schedule an appointment or if you have further questions, please contact your primary clinic:   [facility]       [phone]   [facility]       [phone]              [facility]     [phone]      Powered by [employer]    Sincerely,    Kolton Majano M.D.

## 2022-02-16 NOTE — LETTER
(Inserted Image. Unable to display)   March 25, 2021  NOY CRUZ  [address] [place], [place] [id number]          Dear NOY,      Thank you for selecting [facility] for your healthcare needs.    Our records indicate you are due for the following services:    Diabetic Exam ~ Please bring your glucose meter and/or your blood glucose diary to your appointment.  Non-Fasting Labs    If you had your labs done at another facility or with Direct Access Lab Testing at [facility], please bring in a copy of the results to your next visit, mail a copy, or drop off a copy of your results to your Healthcare Provider.    You are due for lab work and an office visit; please schedule the lab appointment 1 week before the office visit.  This will assure all results are available to discuss with your Healthcare Provider during your visit.    (FYI   Regarding office visits: In some instances, a video visit or telephone visit may be offered as an option.)      **It is very helpful if you bring your medication bottles to your appointment.  This assures we have all of your current medications, including strength and dosing information, documented accurately in your medical record.    To schedule an appointment or if you have further questions, please contact your clinic at [phone].         Powered by [employer]    Sincerely,    Kolton Majano M.D.

## 2022-02-16 NOTE — TELEPHONE ENCOUNTER
---------------------  From: Yeny Thakur RN (Unit 2 Pool ([address]) )   To: Kolton Majano MD;     Sent: 6/11/2020 3:02:17 PM CDT  Subject: labs here now     Patient is here now for his routine labs. He tells me he has more recent fatigue and significant increase in arthritis symptoms in his knees, occasional mild dizziness. He would like to be tested for Lyme's today along with his routine labs.---------------------  From: Kolton Majano MD   To: Unit 2 Pool ([address]) ;     Sent: 6/11/2020 3:05:09 PM CDT  Subject: RE: labs here now     Fine to check a Lyme titer. Add a TSH and make sure CBC is a part of itdone

## 2022-02-16 NOTE — NURSING NOTE
Comprehensive Intake Entered On:  2/24/2021 11:56 AM CST    Performed On:  2/24/2021 11:53 AM CST by Blanca Chowdhury CMA               Summary   Chief Complaint :   Follow up COVID still have cough verbal consent given for telephone visit   Height Measured :   67 in(Converted to: 5 ft 7 in, 170.18 cm)    [place] [facility] Blanca - 2/24/2021 11:53 AM CST   Health Status   Allergies Verified? :   Yes   Medication History Verified? :   Yes   Immunizations Current :   No   Medical History Verified? :   No   Pre-Visit Planning Status :   Not completed   Tobacco Use? :   Never smoker   Bahman Chowdhury CMAssica - 2/24/2021 11:53 AM CST   Consents   Consent for Immunization Exchange :   Consent Granted   Consent for Immunizations to Providers :   Consent Granted   Blanca Chowdhury CMA - 2/24/2021 11:53 AM CST   Meds / Allergies   (As Of: 2/24/2021 11:56:48 AM CST)   Allergies (Active)   No known allergies  Estimated Onset Date:   Unspecified ; Created By:   Gena Adan LPN; Reaction Status:   Active ; Category:   Drug ; Substance:   No known allergies ; Type:   Allergy ; Updated By:   Gena Adan LPN; Reviewed Date:   2/24/2021 11:55 AM CST        Medication List   (As Of: 2/24/2021 11:56:48 AM CST)   Prescription/Discharge Order    atropine-diphenoxylate  :   atropine-diphenoxylate ; Status:   Prescribed ; Ordered As Mnemonic:   Lomotil 2.5 mg-0.025 mg oral tablet ; Simple Display Line:   1 tab(s), po, tid, 90 tab(s), 5 Refill(s) ; Ordering Provider:   Kolton Mjaano MD; Catalog Code:   atropine-diphenoxylate ; Order Dt/Tm:   12/9/2020 3:05:34 PM CST          bromocriptine  :   bromocriptine ; Status:   Prescribed ; Ordered As Mnemonic:   bromocriptine 0.8 mg oral tablet ; Simple Display Line:   2.4 mg, 3 tab(s), po, qam, 280 tab(s), 3 Refill(s) ; Ordering Provider:   Kolton Majano MD; Catalog Code:   bromocriptine ; Order Dt/Tm:   6/24/2020 3:09:42 PM CDT          dutasteride  :   dutasteride ; Status:   Prescribed ; Ordered  As Mnemonic:   Avodart 0.5 mg oral capsule ; Simple Display Line:   0.5 mg, 1 cap(s), PO, Daily, 95 cap(s), 3 Refill(s) ; Ordering Provider:   Kolton Majano MD; Catalog Code:   dutasteride ; Order Dt/Tm:   6/24/2020 3:10:42 PM CDT          ipratropium nasal  :   ipratropium nasal ; Status:   Prescribed ; Ordered As Mnemonic:   Atrovent 42 mcg/inh nasal spray ; Simple Display Line:   2 spray(s), nasal, qid, PRN: for nasal congestion, 1 EA, 2 Refill(s) ; Ordering Provider:   Misael Crow PA-C; Catalog Code:   ipratropium nasal ; Order Dt/Tm:   5/1/2018 10:02:36 AM CDT          lidocaine topical  :   lidocaine topical ; Status:   Prescribed ; Ordered As Mnemonic:   lidocaine 5% topical ointment ; Simple Display Line:   1 nini, top, tid, 35 gm, 3 Refill(s) ; Ordering Provider:   Kolton Majano MD; Catalog Code:   lidocaine topical ; Order Dt/Tm:   12/19/2019 3:27:49 PM CST          metFORMIN  :   metFORMIN ; Status:   Prescribed ; Ordered As Mnemonic:   metFORMIN 500 mg oral tablet, extended release ; Simple Display Line:   4 tab(s), Oral, daily, 380 tab(s), 3 Refill(s) ; Ordering Provider:   Kolton Majano MD; Catalog Code:   metFORMIN ; Order Dt/Tm:   6/24/2020 3:18:49 PM CDT          modafinil  :   modafinil ; Status:   Prescribed ; Ordered As Mnemonic:   modafinil 200 mg oral tablet ; Simple Display Line:   200 mg, 1 tab(s), po, qam, [facility], 30 tab(s), 0 Refill(s) ; Ordering Provider:   Kolton Majano MD; Catalog Code:   modafinil ; Order Dt/Tm:   12/28/2020 8:36:19 AM CST          sildenafil  :   sildenafil ; Status:   Prescribed ; Ordered As Mnemonic:   sildenafil 100 mg oral tablet ; Simple Display Line:   100 mg, 1 tab(s), Oral, daily, 1 hour before sexual activity, 30 tab(s), 5 Refill(s) ; Ordering Provider:   Kolton Majano MD; Catalog Code:   sildenafil ; Order Dt/Tm:   9/23/2020 10:54:21 AM CDT          triamcinolone topical  :   triamcinolone topical ; Status:   Prescribed ;  Ordered As Mnemonic:   triamcinolone 0.1% topical cream ; Simple Display Line:   1 nini, TOP, bid, use for no more than 2wks at a time, 60 gm, 1 Refill(s) ; Ordering Provider:   Kolton Majano MD; Catalog Code:   triamcinolone topical ; Order Dt/Tm:   12/19/2019 3:36:42 PM CST          venlafaxine  :   venlafaxine ; Status:   Prescribed ; Ordered As Mnemonic:   venlafaxine 75 mg oral capsule, extended release ; Simple Display Line:   75 mg, 1 cap(s), PO, Daily, 95 cap(s), 1 Refill(s) ; Ordering Provider:   Kolton Majano MD; Catalog Code:   venlafaxine ; Order Dt/Tm:   6/30/2020 6:20:30 AM CDT            Home Meds    acetaminophen  :   acetaminophen ; Status:   Documented ; Ordered As Mnemonic:   acetaminophen ; Simple Display Line:   325 mg, po, q4 hrs, PRN: as needed for pain ; Catalog Code:   acetaminophen ; Order Dt/Tm:   7/1/2014 1:21:25 PM CDT          acetaminophen-oxycodone  :   acetaminophen-oxycodone ; Status:   Documented ; Ordered As Mnemonic:   acetaminophen-oxycodone 325 mg-5 mg oral tablet ; Simple Display Line:   1 tab(s), po, daily, PRN: as needed for pain ; Catalog Code:   acetaminophen-oxycodone ; Order Dt/Tm:   9/5/2012 2:11:41 PM CDT          biotin  :   biotin ; Status:   Documented ; Ordered As Mnemonic:   biotin 300 mcg oral tablet ; Simple Display Line:   300 mcg, 1 tab(s), po, daily ; Catalog Code:   biotin ; Order Dt/Tm:   7/1/2014 1:23:25 PM CDT          ibuprofen  :   ibuprofen ; Status:   Documented ; Ordered As Mnemonic:   ibuprofen 600 mg oral tablet ; Simple Display Line:   600 mg, 1 tab(s), po, daily, PRN: as needed for pain ; Catalog Code:   ibuprofen ; Order Dt/Tm:   9/5/2012 2:09:45 PM CDT          Miscellaneous Prescription  :   Miscellaneous Prescription ; Status:   Documented ; Ordered As Mnemonic:   Vitamin C ; Simple Display Line:   See Instructions, 1000mg tablet po bid ; Catalog Code:   Miscellaneous Prescription ; Order Dt/Tm:   7/1/2014 1:22:31 PM CDT            ID Risk  Screen   Recent Travel History :   No recent travel   Family Member Travel History :   No recent travel   Other Exposure to Infectious Disease :   Unknown   COVID-19 Testing Status :   Positive COVID-19 test in the last 30 days   [place] [facility] Bl[facility] - 2/24/2021 11:53 AM CST   Social History   Social History   (As Of: 2/24/2021 11:56:49 AM CST)   Alcohol:        Current, Beer (12 oz), 1-2 times per month, 2 drinks/episode average.   (Last Updated: 6/5/2014 6:36:23 PM CDT by Elsie Sellers CMA)          Tobacco:        Current, Snuff - 1/2 can per day, 25 year(s).   (Last Updated: 7/1/2014 1:00:11 PM CDT by Elsie Sellers CMA)   Never (less than 100 in lifetime)   (Last Updated: 2/10/2021 3:11:14 PM CST by Alberto Palumbo CMA)          Electronic Cigarette/Vaping:        Electronic Cigarette Use: Never.   (Last Updated: 2/10/2021 3:10:53 PM CST by Alberto Palumbo CMA)          Substance Abuse:        Never   (Last Updated: 10/9/2012 9:09:03 AM CDT by Yaa Tillman)          Employment/School:        Retired, Work/School description: Lt. Col., [employer].   (Last Updated: 10/9/2012 9:10:02 AM CDT by Yaa Tillman)          Exercise:        Exercise frequency: 1-2 times/week.  Exercise type: Walking, Weight lifting.   (Last Updated: 10/9/2012 9:08:01 AM CDT by Yaa Tillman)          Sexual:        Sexually active: No.  Sexual orientation: Heterosexual.   (Last Updated: 10/9/2012 9:06:24 AM CDT by Yaa Tillman)

## 2022-02-16 NOTE — LETTER
(Inserted Image. Unable to display)           [address] [place] [address]  [phone]    June 11, 2021      NOY CRUZ      [address] [place] [address]        Dear NOY,    Thank you for selecting [facility] for your healthcare needs. Below you will find the result of your recent test(s) done at our clinic.     Your culture was negative. Your PSA is normal. Let us know if your symptoms persist.      Result Name Current Result Previous Result Reference Range   PSA (ng/mL)  0.4 6/9/2021  0.7 6/11/2020  - < OR = 4.0   Urine Culture  See comment 6/9/2021         Please contact my practice at [phone] if you have any questions or concerns.      Sincerely,        Reyes Morris MD ,   Kailee Mares MD,   Meek Serna PA-C                  What do you labs mean?  Below is a glossary of commonly ordered labs:  LDL - Bad Cholesterol HDL - Good Cholesterol  AST/ALT - Liver Function Cr/creatinine - Kidney Function  Microalbumin - Kidney Function  TSH - Thyroid Function PSA- Prostate  Hgb - iron stores/blood count Hgb A1c - Diabetic control

## 2022-02-16 NOTE — TELEPHONE ENCOUNTER
---------------------  From: Marilynn Parekh LPN (eRx Pool ([id number]))   To: Kolton Majano MD;     Sent: 12/10/2021 11:12:11 AM CST  Subject: FW: Medication Management   Due Date/Time: 12/11/2021 11:01:00 AM CST     Please advise on refill  Per note 11/2/21 pt given 15 tabs for 3 months.  Last Rx in med list is from 9/20/21      ------------------------------------------  From: [place]  To: Kolton Majano MD  Sent: December 10, 2021 11:01:35 AM CST  Subject: Medication Management  Due: November 16, 2021 3:34:48 PM CST     ** On Hold Pending Signature **     Drug: acetaminophen-oxycodone (acetaminophen-oxycodone 325 mg-5 mg oral tablet), ONE TAB(S) ORAL EVERY FOUR HOURS HRS PRN:FOR PAIN INSTR:NEEDS TO BE SEEN FOR FURTHER REFILLS  Quantity: 15 tab(s)  Days Supply: 3  Refills: 0  Substitutions Allowed  Notes from Pharmacy:     Dispensed Drug: acetaminophen-oxycodone (acetaminophen-oxycodone 325 mg-5 mg oral tablet), ONE TAB(S) ORAL EVERY FOUR HOURS HRS PRN:FOR PAIN INSTR:NEEDS TO BE SEEN FOR FURTHER REFILLS  Quantity: 15 tab(s)  Days Supply: 3  Refills: 0  Substitutions Allowed  Notes from Pharmacy:  ---------------------------------------------------------------  From: Kolton Majano MD   To: Blanca Chowdhury CMA;     Sent: 12/10/2021 2:55:18 PM CST  Subject: RE: Medication Management     Let him know 15 tabs sent to pharmacyCalled and spoke with pt to see if had  script he stated that he has not the he will go pick it up now, he will call back if there are any problems, he also stated that he will schedule a follow up in Jan/Feb or sooner.       RUDDY Chowdhury CMA

## 2022-02-16 NOTE — LETTER
(Inserted Image. Unable to display)   February 19, 2020        NOY CRUZ  [address] [place], [place] [id number]        Dear NOY,      Thank you for selecting [facility] for your healthcare needs.    Our records indicate you are due for the following services:    Follow-up office visit for Chest X-ray    To schedule an appointment, please contact the referral department at [facility] who will assist you in scheduling your service. They can be reached at either of these numbers [phone] or [phone]. If you have other questions or need additional information, please feel free to contact your primary care clinic:     To schedule an appointment or if you have further questions, please contact your primary clinic:              [facility]                [phone]              [facility]                  [phone]              [facility]     [phone]      Powered by "[employer]    Sincerely,    Kolton Majano M.D.

## 2022-02-16 NOTE — LETTER
(Inserted Image. Unable to display)   July 13, 2021    NOY CRUZ  [address] [place] [address]            Dear NOY,      Thank you for selecting [facility] for your healthcare needs.    Our records indicate you are due for the following services:    Urine Labs ~ Please be prepared to leave a urine specimen for evaluation.    Fasting Lab Tests ~ Please do not eat or drink anything 10 hours prior to your scheduled appointment time.  (Water and any medications that you may need are allowed unless directed otherwise.)    If you had your labs done at another facility or with Direct Access Lab Testing at Psychiatric hospital, please bring in a copy of the results to your next visit, mail a copy, or drop off a copy of your results to your Healthcare Provider.    (FYI   Regarding office visits: In some instances, a video visit or telephone visit may be offered as an option.)      To schedule an appointment or if you have further questions, please contact your clinic at [phone].      Powered by [employer]    Sincerely,    Kolton Majano MD

## 2022-02-16 NOTE — PROGRESS NOTES
Patient:   NOY CRUZ            MRN: [id number]            FIN: [id number]               Age:   70 years     Sex:  Male     :  1950   Associated Diagnoses:   Chronic diarrhea; History of anemia; Diabetes Mellitus; Pruritus, Perianal   Author:   Kolton Majano MD      Visit Information      Date of Service: 2021 02:19 pm  Performing Location: [facility]  Encounter#: [id number]      Primary Care Provider (PCP):  Kolton Majano MD    NPI# [id number]      Referring Provider:  Kolton Majano MD# [id number]      Chief Complaint   2021 2:26 PM CDT    Medication check        History of Present Illness   Had Coronavirus 2020. Has been significantly fatigued. Started Bromocriptine 2017 for diabetes. No significant side effects. Increasing exercise and improving diet (reduced sweets) since finding out HgbA1c 8.4%. Eating lots of salads.    Chewing rarely. Using nicorrette. Wants to quit and concerned that chewing could have contributed to the colon polyps. Chews copehagen 2-3 cans a week. Trying nicorrette.. Chewing for 30 years. Used an herbal supplement and gum. Using nicorette lozenges.    Had hemorrhoids surgically fixed . Continues to have pain and itching intermittently but less frequently. When has rectal pain 1x a week triamcinolone 0.1% cream and lidocaine helps.    Had loose stools 3-4x a day until starting cholestyramine. Now has 1-2 stools usually formed. Still taking lomotil 3 tabs a day. Loose stools started following the right hemicolectomy . Still has gallbladder.    Takes percocet about 5-6 tabs a month for various chronic pains. [facility] but no longer  Will be getting a cervical injection  Uses tylenol daily for lower back pain and neck pain    Diabetes diagnosed in .  Everything hurts when walks a mile. Most pain in left achilles since injury .  Weight is stable  Takes modafinil for  sleep apnea and [facility] when [facility]  Takes effexor  Prostate 8.5 in  with normal biopsies  [place] jump with injury in neck and has had chronic issues and chronic pain  Taking Effexor since  for anxiety and depression (knew some people  in [place] [employer] on )  Taking modafinil since  giving sleepiness from sleep apnea. Uses it with driving.         Review of Systems   Respiratory:  No shortness of breath.    Cardiovascular:  No chest pain.    Gastrointestinal:  No vomiting.    PHQ-9: 2pts (2019). 7pts (2017). 2pts (2016)  Eczema in the winter  CAGE: 0pts and minimal alcohol  No longer having black stools      Health Status   Allergies:    Allergic Reactions (Selected)  No known allergies   Medications:  (Selected)   Prescriptions  Prescribed  Atrovent 42 mcg/inh nasal spray: 2 spray(s), nasal, qid, PRN: for nasal congestion, # 1 EA, 2 Refill(s), Type: Maintenance, Pharmacy: [facility], 2 spray(s) nasal qid,PRN:for nasal congestion  Avodart 0.5 mg oral capsule: = 1 cap(s) ( 0.5 mg ), PO, Daily, # 95 cap(s), 3 Refill(s), Type: Maintenance, Pharmacy: [facility], 1 cap(s) Oral daily, 67, in, 20 14:54:00 CDT, Height Measured, 161, lb, 20 14:54:00 CDT, Weight Measured  Lomotil 2.5 mg-0.025 mg oral tablet: 1 tab(s), po, tid, # 90 tab(s), 5 Refill(s), Type: Maintenance, Pharmacy: [facility], [place], 1 tab(s) Oral tid, 67, in, 20 10:31:00 CDT, Height Measured, 162, lb, 20 10:31:00 CDT, Weight Measured  bromocriptine 0.8 mg oral tablet: = 3 tab(s) ( 2.4 mg ), po, qam, # 280 tab(s), 3 Refill(s), Type: Maintenance, Pharmacy: [facility], 3 tab(s) Oral qam, 67, in, 20 14:54:00 CDT, Height Measured, 161, lb, 20 14:54:00 CDT, Weight Measured  doxycycline hyclate 100 mg oral tablet: = 2 tab(s) ( 200 mg ), po, daily, # 2 tab(s), 1 Refill(s), Type: Maintenance, Pharmacy: [facility], 2 tab(s) Oral daily,x1 day(s), 67, in,  02/24/21 11:53:00 CST, Height Measured, 162, lb, 09/23/20 10:31:00 CDT, Weight Measured  lidocaine 5% topical ointment: 1 nini, top, tid, # 35 gm, 3 Refill(s), Type: Maintenance, Pharmacy: [facility], 1 nini Topical tid  metFORMIN 500 mg oral tablet, extended release: = 4 tab(s), Oral, daily, # 380 tab(s), 3 Refill(s), SHADE, Type: Maintenance, Pharmacy: [facility], Pt has appt on 2/21 will give more refills at that time, 4 tab(s) Oral daily, 67, in, 06/24/20 14:54:00 CDT, Height Measured, 161, lb, 06/24/20 14:54:0...  modafinil 200 mg oral tablet: = 1 tab(s) ( 200 mg ), po, qam, [facility]: [facility], # 30 tab(s), 0 Refill(s), Type: Maintenance, Pharmacy: [facility], 1 tab(s) Oral qam,Instr:[facility], 67, in, 09/23/20 10:31:00 CDT, Height Measured...  sildenafil 100 mg oral tablet: = 1 tab(s) ( 100 mg ), Oral, daily, Instructions: 1 hour before sexual activity, # 30 tab(s), 5 Refill(s), Type: Maintenance, Pharmacy: [facility], 1 tab(s) Oral daily,Instr:1 hour before sexual activity, 67, in, 09/23/20 10:31:00 CDT, Height Measur...  triamcinolone 0.1% topical cream: 1 nini, TOP, bid, Instructions: use for no more than 2wks at a time, # 60 gm, 1 Refill(s), Type: Maintenance, Pharmacy: [facility], 1 nini Topical bid,Instr:use for no more than 2wks at a time  venlafaxine 75 mg oral capsule, extended release: = 1 cap(s) ( 75 mg ), PO, Daily, # 95 cap(s), 1 Refill(s), Type: Maintenance, Pharmacy: [facility], 1 cap(s) Oral daily, 67, in, 06/24/20 14:54:00 CDT, Height Measured, 161, lb, 06/24/20 14:54:00 CDT, Weight Measured  Documented Medications  Documented  Vitamin C: Vitamin C, See Instructions, Instructions: 1000mg tablet po bid, Supply, 0 Refill(s), Type: Maintenance  acetaminophen-oxycodone 325 mg-5 mg oral tablet: 1 tab(s), po, daily, PRN: as needed for pain, 0 Refill(s), Type: Maintenance  acetaminophen: ( 325 mg ), po, q4 hrs, PRN: as needed for pain, 0  Refill(s), Type: Maintenance  biotin 300 mcg oral tablet: 1 tab(s) ( 300 mcg ), po, daily, 0 Refill(s), Type: Maintenance  ibuprofen 600 mg oral tablet: 1 tab(s) ( 600 mg ), po, daily, PRN: as needed for pain, 0 Refill(s), Type: Maintenance   Problem list:    All Problems  Colon Polyps / SNOMED CT 399305261 / Confirmed  BPH (benign prostatic hypertrophy) / SNOMED CT 040821805 / Confirmed  Diabetes mellitus / SNOMED CT 365336584 / Confirmed  Refusal of statin medication by patient / SNOMED CT 179078113 / Confirmed  Resolved: *[email or web address] - Sessile serrated adenomas  Resolved: Tobacco user / ICD-9-.1      Histories   Procedure history:    Cardiac computed tomography for calcium scoring (SNOMED CT 9782425313) on 2018 at 67 Years.  Comments:  2018 9:26 AM CDT - Kailee Woodward  Total Agatston calcium score is 393  Colonoscopy (SNOMED CT 202904142) performed by Kolton Majano MD on 2017 at 67 Years.  Comments:  2017 3:20 PM CST - Larissa Romano  Sedation:  Fentanyl and Versed  Negative for microscopic colitis  F/u in 5 years  Right hemicolectomy (SNOMED CT 874026373) performed by Nitesh Whitehead MD on 2014 at 63 Years.  Colonoscopy (SNOMED CT 835759432) performed by Shira Christian MD on 2009 at 58 Years.  Biopsy of prostate x 3 (SNOMED CT 848139118) in  at 58 Years.  Repair of tendon of upper limb (SNOMED CT 442306275) on 2002 at 51 Years.  Extraction of wisdom tooth (SNOMED CT 442800237) in  at 21 Years.     Family History: No colon cancer or polyps on Dad side but mom side unknown, Mom  age 93 from stroke/CHF. Dad  heart attack age 65 and had CABG in 50's. Brother is healthy  Social History: Retired [employer]  (33 years). Currently chewing 2-3 cans a week down from 4-5. Single (never ) and no children. Last long term relationship  of Leukemia      Physical Examination   Vital Signs   2021 2:26 PM CDT Temperature Tympanic 97.3 DegF  LOW     Peripheral Pulse Rate 76 bpm    Pulse Site Radial artery    HR Method Manual    Systolic Blood Pressure 122 mmHg    Diastolic Blood Pressure 74 mmHg    Mean Arterial Pressure 90 mmHg    BP Site Right arm    BP Method Manual    Oxygen Saturation 99 %      Measurements from flowsheet : Measurements   4/12/2021 2:26 PM CDT Height Measured - Standard 67 in    Weight Measured - Standard 163.7 lb    BSA 1.87 m2    Body Mass Index 25.64 kg/m2  HI      General:  Alert and oriented, No acute distress.    Respiratory:  Lungs are clear to auscultation.    Cardiovascular:  Normal rate, Regular rhythm, No edema.    Musculoskeletal:  Normal gait.    Neurologic: normal monofilament testing (September 2020)  Psychiatric: word recall immediate 3/3 and delayed 3/3 (December 2019)  Extremities: No leg swelling  Skin: 5mm brown colored lesion right thigh      Review / Management   Cardiac Calcium score (July 2018): 393pts with 77th percentile      Results review      Impression and Plan   Diagnosis     Chronic diarrhea (HCX96-MW K52.9).     History of anemia (MFO41-KT Z86.2).     Diabetes Mellitus (BIK36-TH E11.9).     Pruritus, Perianal (UDI09-XM L29.0).       Diabetes: continue bromocriptine 2.4mg daily with HgbA1c 7.4% (April 2021). Recheck in 3 months. Continue metformin 2000mg daily. Discussed and declines statin given diabetes even with low LDL Taking aspirin.    Anal pruritis: lidocaine has worked well. Seldom needs anymore  Diarrhea: continue lomotil 2-3x daily (December 2019)  Anemia: has corrected with normal iron stores. Will continue to monitor CBC  Colon cancer screening: had right hemicolectomy for large polyps 2014. Normal colonoscopy 2009. Two colonoscopy prior to 2009. Normal colonoscopy November 2017 and repeat in 2022  Asbestosis exposure: [place] in the . CXR 2017 and 2020 negative and repeat 2025  [facility]  Eczema in the winter and triamcinolone helps  Chronic pain: will  give 15 tabs for 3 months (no longer gets [facility])  Skin lesion: monitor  PSA 0.70-1.42 since 2012    Reviewed [place] [facility] [place] Monitoring Program April 2021

## 2022-02-16 NOTE — NURSING NOTE
Pt appears on __[facility]_ chronic disease panel as out of parameters for __DM/Statin use_.  Per provider note pt declines statin use.  Cholestrol numbers are good. Pt due in November for DM visit.

## 2022-02-16 NOTE — PROGRESS NOTES
Chief Complaint    wants referral to dermatologist for left great toenail.  was seen by Dr. Majano 6/6/19.  History of Present Illness      Patient is here to have his left great toenail rechecked.  Had it looked at 6/6/19 by Dr. Majano, who suggested that if it doesn't improve over the next month he should get a referral to a dermatologist to rule out possible melanoma.  He had a blackened area on his toenail since March 2019.  He denies recent injury to toe, does feel his toes do cross over when wearing shoes.  Also denies having pain to his toe.  Review of Systems           See HPI.  All other review of systems negative.              Physical Exam   Vitals & Measurements    T: 97.6   F (Tympanic)  HR: 76(Peripheral)  BP: 106/64     HT: 67 in  WT: 159.2 lb  BMI: 24.93           General:  Alert and oriented, No acute distress.            Musculoskeletal:  Normal gait.            Integumentary:  Warm, No rash.  Inner left great toenail darkened/bruised.          Psychiatric:  Cooperative, Appropriate mood & affect, Normal judgment.             Assessment/Plan       1. Discolored nails (L60.8)         Will place referral to dermatology, either in [place] or [place].      IIna MA, acted solely as a scribe for, and in the presence of Dr. Kolton Deluca who performed the services.             I, Kolton Deluca MD, personally performed the services described in this documentation.  The documentation was scribed in my presence and is both accurate and complete.                Patient Information     Name:NOY CRUZ      Address:      [address] [place] [id number]     Sex:Male     YOB: 1950     [phone]     Emergency Contact:[facility] EMERGENCY, CONTACT     MRN:[id number]     FIN:[id number]     Location:[facility]     Date of Service:07/08/2019      Primary Care Physician:       Kolton Majano MD, [phone]      Attending Physician:        Yosi JOHNSON, Kolton, [phone]  Problem List/Past Medical History    Ongoing     BPH (benign prostatic hypertrophy)     Colon Polyps     Diabetes mellitus     Refusal of statin medication by patient       Comments: Per 9/21/2017 provider note    Historical     *Hospitalized@[facility] - Sessile serrated adenomas     Tobacco user  Procedure/Surgical History     Cardiac computed tomography for calcium scoring (07/02/2018)            Comments: Total Agatston calcium score is 393.     Colonoscopy (11/21/2017)            Comments: Sedation:  Fentanyl and Versed      Negative for microscopic colitis      F/u in 5 years.     Right hemicolectomy (06/26/2014)           Colonoscopy (01/27/2009)           Biopsy of prostate x 3 (2008)           Repair of tendon of upper limb (02/13/2002)           Extraction of wisdom tooth (1971)        Medications     venlafaxine 75 mg oral capsule, extended release: 75 mg, 1 cap(s), PO, Daily, 30 cap(s).     ibuprofen 600 mg oral tablet: 600 mg, 1 tab(s), po, daily, PRN: as needed for pain.     acetaminophen-oxycodone 325 mg-5 mg oral tablet: 1 tab(s), po, daily, PRN: as needed for pain.     modafinil 200 mg oral tablet: 200 mg, 1 tab(s), po, qam, PRN.     acetaminophen: 325 mg, po, q4 hrs, PRN: as needed for pain.     Vitamin C: See Instructions, 1000mg tablet po bid.     biotin 300 mcg oral tablet: 300 mcg, 1 tab(s), po, daily.     Avodart 0.5 mg oral capsule: 0.5 mg, 1 cap(s), PO, Daily, 30 cap(s), 1 Refill(s).     Prevalite 4 g/5.5 g oral powder for reconstitution: 4 gm, po, daily, 90 EA, 3 Refill(s).     triamcinolone 0.1% topical cream: 1 nini, TOP, bid, use for no more than 2wks at a time, 60 gm, 1 Refill(s).     Atrovent 42 mcg/inh nasal spray: 2 spray(s), nasal, qid, PRN: for nasal congestion, 1 EA, 2 Refill(s).     sildenafil 20 mg oral tablet: 2.5 tab(s), Oral, daily, 30-60 MINUTES PRIOR TO SEXUAL ACTIVITY., PRN: AS NEEDED, 10 tab(s), 10 Refill(s).     bromocriptine 0.8 mg oral  tablet: 0.8 mg, 1 tab(s), po, qam, 95 tab(s), 3 Refill(s).     metFORMIN 500 mg oral tablet, extended release: 4 tab(s), Oral, daily, 380 tab(s), 3 Refill(s).     lidocaine 5% topical ointment: 1 nini, top, tid, 35 gm, 3 Refill(s).     Lomotil 2.5 mg-0.025 mg oral tablet: 1 tab(s), po, tid, 90 tab(s), 5 Refill(s).      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 Exercise type: Walking, Weight lifting., 10/09/2012     Sexual      Sexually active: No. Sexual orientation: Heterosexual., 10/09/2012     Substance Abuse      Never, 10/09/2012     Tobacco      Current, Snuff - 1/2 can per day, 25 year(s)., 07/01/2014  Family History    CABG - Coronary artery bypass graft: Father.    Heart failure: Mother.    Stroke: Mother.    Brother: History is negative  Immunizations      Vaccine Date Status      tetanus/diphth/pertuss (Tdap) adult/adol 01/02/2016 Recorded  Lab Results       Lab Results (Last 4 results within 90 days)        Hgb A1c: 6.9 High (06/03/19 10:02:00)

## 2022-02-16 NOTE — PROGRESS NOTES
Patient:   NOY CRUZ            MRN: [id number]            FIN: [id number]               Age:   67 years     Sex:  Male     :  1950   Associated Diagnoses:   Chronic diarrhea; History of anemia; Diabetes Mellitus; Pruritus, Perianal   Author:   Kolton Majano MD      Visit Information      Date of Service: 2017 02:28 pm  Performing Location: [facility]  Encounter#: [id number]      Primary Care Provider (PCP):  Kolton Majano MD    NPI# [id number]      Referring Provider:  Kolton Majano MD    NPI# [id number]      Chief Complaint   2017 2:34 PM CST   F/u labs      History of Present Illness   Started Bromocriptine 2017 for diabetes. No significant side effects.    Tried quitting chew on own. Tried nicorrette. Wants to quit and concerned that chewing could have contributed to the colon polyps. Chews copehagen 1-2 cans a week. Chewing for 30 years. Used an herbal supplement and gum. Using nicorette lozenges.    Had hemorrhoids surgically fixed . Continues to have pain and itching intermittently but less frequently. When has rectal pain 1x a week triamcinolone 0.1% cream and lidocaine helps.    Had loose stools 3-4x a day until starting cholestyramine. Now has 1-2 stools usually formed. Still taking lomotil 4 tabs a day. Loose stools started following the right hemicolectomy . Still has gallbladder.    Takes percocet about 4-6 tabs a month for various chronic pains. [facility]  Uses ibuprofen 3-4x a week.    Diabetes diagnosed in .  Everything hurts when walks a mile. Most pain in left achilles since injury .  Has lost 10 lbs recently  Takes modafinil for sleep apnea and [facility]  Takes effexor  Prostate 8.5 in  with normal biopsies         Review of Systems   Constitutional:  No fever.    Respiratory:  No shortness of breath.    Cardiovascular:  No chest pain.    Gastrointestinal:  No vomiting.    PHQ-9:  7pts (September 2017). 2pts (February 2016)  Eczema in the winter  CAGE: 0pts and minimal alcohol  Has some occasional black stools and not taking iron      Health Status   Allergies:    Allergic Reactions (Selected)  No known allergies   Medications:  (Selected)   Prescriptions  Prescribed  Avodart 0.5 mg oral capsule: 1 cap(s) ( 0.5 mg ), PO, Daily, # 30 cap(s), 1 Refill(s), Type: Maintenance, Pharmacy: [facility], 1 cap(s) po daily  Lomotil 2.5 mg-0.025 mg oral tablet: 1-2 tab(s), po, qid, # 180 tab(s), 5 Refill(s), Type: Maintenance, Pharmacy: [facility] [place], 1-2 tab(s) po qid  Prevalite 4 g/5.5 g oral powder for reconstitution: ( 4 gm ), po, daily, # 90 EA, 3 Refill(s), Type: Maintenance, Pharmacy: [facility], 4 gm po daily  Viagra 100 mg oral tablet: 0.5 tab(s) ( 50 mg ), po, daily, Instructions: take when needed., # 10 tab(s), 3 Refill(s), Type: Maintenance, Pharmacy: [facility], 0.5 tab(s) po daily,Instr:take when needed.  bromocriptine 0.8 mg oral tablet: 1 tab(s) ( 0.8 mg ), po, qam, # 95 tab(s), 1 Refill(s), Type: Maintenance, Pharmacy: [facility], 1 tab(s) po qam  lidocaine 5% topical ointment: 1 nini, top, tid, # 35 gm, 3 Refill(s), Type: Maintenance, Pharmacy: [facility], 1 nini top tid  metFORMIN 500 mg oral tablet, extended release: 4 tab(s) ( 2,000 mg ), po, daily, # 370 tab(s), 3 Refill(s), Type: Maintenance, Pharmacy: [facility], Pt is overdue for DM check and labs--must be seen for further refills., 4 tab(s) po daily  triamcinolone 0.1% topical cream: 1 nini, TOP, bid, Instructions: use for no more than 2wks at a time, # 60 gm, 1 Refill(s), Type: Maintenance, Pharmacy: [facility], 1 nini top bid,Instr:use for no more than 2wks at a time  Documented Medications  Documented  Vitamin C: Vitamin C, See Instructions, Instructions: 1000mg tablet po bid, Supply, 0 Refill(s), Type: Maintenance  acetaminophen-oxycodone 325 mg-5 mg oral tablet: 1 tab(s), po, daily, PRN:  as needed for pain, 0 Refill(s), Type: Maintenance  acetaminophen: ( 325 mg ), po, q4 hrs, PRN: as needed for pain, 0 Refill(s), Type: Maintenance  biotin 300 mcg oral tablet: 1 tab(s) ( 300 mcg ), po, daily, 0 Refill(s), Type: Maintenance  ibuprofen 600 mg oral tablet: 1 tab(s) ( 600 mg ), po, daily, PRN: as needed for pain, 0 Refill(s), Type: Maintenance  modafinil 200 mg oral tablet: 1 tab(s) ( 200 mg ), po, qam, Instructions: PRN, 0 Refill(s), Type: Maintenance  venlafaxine 75 mg oral capsule, extended release: 1 cap(s) ( 75 mg ), PO, Daily, # 30 cap(s), 0 Refill(s), Type: Maintenance   Problem list:    All Problems  Colon Polyps / SNOMED CT 424249533 / Confirmed  BPH (benign prostatic hypertrophy) / SNOMED CT 260155571 / Confirmed  Diabetes mellitus / SNOMED CT 819373667 / Confirmed  Refusal of statin medication by patient / SNOMED CT 098732410 / Confirmed  Resolved: *Hospitalized@[place] - Sessile serrated adenomas  Resolved: Tobacco user / ICD-9-.1      Histories   Family History: No colon cancer or polyps on Dad side but mom side unknown, Mom  age 93 from stroke/CHF; Dad  heart attack age 65 and had CABG in 50's; Brother is healthy   Procedure history:    Colonoscopy (SNOMED CT 650720377) performed by Kolton Majano MD on 2017 at 67 Years.  Comments:  2017 3:20 PM - Larissa Romano  Sedation:  Fentanyl and Versed  Negative for microscopic colitis  F/u in 5 years  Right hemicolectomy (SNOMED CT 098106982) performed by Nitesh Whitehead MD on 2014 at 63 Years.  Colonoscopy (SNOMED CT 684926382) performed by Shira Christian MD on 2009 at 58 Years.  Biopsy of prostate x 3 (SNOMED CT 686658978) in  at 58 Years.  Repair of tendon of upper limb (SNOMED CT 771122548) on 2002 at 51 Years.  Extraction of wisdom tooth (SNOMED CT 604224570) in  at 21 Years.   Social History: Retired [employer]. Currently chewing 3 cans a week down from 4-5. Single (never ) and no children       Physical Examination   Vital Signs   12/26/2017 2:34 PM CST Temperature Tympanic 98.2 DegF    Peripheral Pulse Rate 80 bpm    Pulse Site Radial artery    HR Method Manual    Systolic Blood Pressure 124 mmHg    Diastolic Blood Pressure 84 mmHg  HI    Mean Arterial Pressure 97 mmHg    BP Site Right arm    BP Method Manual      Measurements from flowsheet : Measurements   12/26/2017 2:34 PM CST Height Measured - Standard 67 in    Weight Measured - Standard 166.6 lb    BSA 1.89 m2    Body Mass Index 26.09 kg/m2      General:  Alert and oriented, No acute distress.    Neck:  No lymphadenopathy.    Respiratory:  Lungs are clear to auscultation.    Cardiovascular:  Normal rate, Regular rhythm, No edema.    Gastrointestinal:  Soft, Non-tender, Non-distended.    Musculoskeletal:  Normal gait.    Neurologic:  No focal deficits, normal monofilament testing.       Review / Management   HgbA1c 6.1% (December 2017). HgbA1c 7.4% (August 2017).         Impression and Plan   Diagnosis     Chronic diarrhea (QVE40-JM K52.9).     History of anemia (NMM88-CS Z86.2).     Diabetes Mellitus (WXO14-CH E11.9).     Pruritus, Perianal (CEU48-CY L29.0).       Diabetes: Significant improvement with addition of bromocriptine. Continue metformin 2000mg daily and bromocriptine with recent  Discussed statin given diabetes even with low LDL (has declined). Restarted aspirin. Desires Cardiac Calcium score CT in March 2018    Anal pruritis: lidocaine has worked well and refilled. Uses 1-2x a week  Diarrhea: continue cholestyramine once daily and lomotil 4x daily (December 2017)  Anemia: has corrected with normal iron stores. Will monitor CBC  Colon cancer screening: had right hemicolectomy for large [id number]. Normal colonoscopy 2009. Two colonoscopy prior to 2009. Normal colonoscopy November 2017 and repeat in 2022  Asbestosis exposure: [place] in the . CXR 2017 negative and repeat 2020  Eczema in the winter and triamcinolone  helps  Decreased energy: hemoglobin and TSH have been normal. Possible chronic fatigue syndrome (started in 2007)  Smoking: starting to use the nicotene lozenges  Monitor weight  PSA 0.70-1.42 since 2012. [facility] check 1.0 ([facility])    Reviewed [place] [facility] [place] Monitoring [place] (December 2017)    Addendum 7/8: Cardiac calcium score 393 and 77th percentile

## 2022-02-16 NOTE — TELEPHONE ENCOUNTER
---------------------  From: Jennifer LEW, Lydia ATKINS (Phone Messages Pool ([id number]_[facility]))   To: [facility] Message Pool ([id number] [facility] [place]);     Sent: 3/19/2020 11:12:59 AM CDT  Subject: Med request     Phone message    PCP:   ASHVIN      Time of Call:  1051       Person Calling:  Pt  Phone number:  [phone]    Returned call at: _    Note:   Pt LVM stating that a new drug, Chloroquine, has been approved by the FDA for Malaria prevention/treatment and he would like a prescription for it sent in to pharmacy.    Please advise.    Last office visit and reason:  12/19/19, med management---------------------  From: Blanca Chowdhury CMA ([facility] Message Pool ([id number]_[facility]))   To: Kolton Majano MD;     Sent: 3/19/2020 11:14:23 AM CDT  Subject: FW: Med requestTalked with patient and Chloroquine is just in the testing phase at [facility]. Patient understands

## 2022-02-16 NOTE — PROGRESS NOTES
Patient:   NOY CRUZ            MRN: [id number]            FIN: [id number]               Age:   69 years     Sex:  Male     :  1950   Associated Diagnoses:   Chronic diarrhea; History of anemia; Diabetes Mellitus; Pruritus, Perianal   Author:   Kolton Majano MD      Visit Information      Date of Service: 2019 02:48 pm  Performing Location: [facility]  Encounter#: [id number]      Primary Care Provider (PCP):  Kolton Majano MD    NPI# [id number]      Referring Provider:  Kolton Majano MD    NPI# [id number]      Chief Complaint   2019 3:02 PM CST   med check, review labs.      History of Present Illness   Started Bromocriptine 2017 for diabetes. No significant side effects.    Chewing rarely. Using nicorrette. Wants to quit and concerned that chewing could have contributed to the colon polyps. Chews copehagen 2-3 cans a week. Chewing for 30 years. Used an herbal supplement and gum. Using nicorette lozenges.    Had hemorrhoids surgically fixed . Continues to have pain and itching intermittently but less frequently. When has rectal pain 1x a week triamcinolone 0.1% cream and lidocaine helps.    Had loose stools 3-4x a day until starting cholestyramine. Now has 1-2 stools usually formed. Still taking lomotil 3 tabs a day. Loose stools started following the right hemicolectomy . Still has gallbladder.    Takes percocet about 8-10 tabs a month for various chronic pains. [facility] ([facility] Mejia)  Will be getting a cervical injection  Uses tylenol daily for lower back pain and neck pain    Diabetes diagnosed in .  Everything hurts when walks a mile. Most pain in left achilles since injury .  Weight is stable  Takes modafinil for sleep apnea and [facility] by [facility] when [facility]  Takes effexor  Prostate 8.5 in  with normal biopsies  [place] jump with injury in neck and has had chronic issues and chronic pain         Review of  Systems   Respiratory:  No shortness of breath.    Cardiovascular:  No chest pain.    Gastrointestinal:  No vomiting.    PHQ-9: 2pts (December 2019). 7pts (September 2017). 2pts (February 2016)  Eczema in the winter  CAGE: 0pts and minimal alcohol  No longer having black stools      Health Status   Allergies:    Allergic Reactions (Selected)  No known allergies   Medications:  (Selected)   Prescriptions  Prescribed  Atrovent 42 mcg/inh nasal spray: 2 spray(s), nasal, qid, PRN: for nasal congestion, # 1 EA, 2 Refill(s), Type: Maintenance, Pharmacy: [facility], 2 spray(s) nasal qid,PRN:for nasal congestion  Avodart 0.5 mg oral capsule: 1 cap(s) ( 0.5 mg ), PO, Daily, # 30 cap(s), 1 Refill(s), Type: Maintenance, Pharmacy: [facility], 1 cap(s) po daily  Lomotil 2.5 mg-0.025 mg oral tablet: 1 tab(s), po, tid, # 90 tab(s), 5 Refill(s), Type: Maintenance, Pharmacy: [facility] [place], 1 tab(s) Oral tid  bromocriptine 0.8 mg oral tablet: = 1 tab(s) ( 0.8 mg ), po, qam, # 95 tab(s), 3 Refill(s), Type: Maintenance, Pharmacy: [facility], 1 tab(s) Oral qam  lidocaine 5% topical ointment: 1 nini, top, tid, # 35 gm, 3 Refill(s), Type: Maintenance, Pharmacy: [facility], 1 nini Topical tid  metFORMIN 500 mg oral tablet, extended release: = 4 tab(s), Oral, daily, # 380 tab(s), 3 Refill(s), SHADE, Type: Maintenance, Pharmacy: [facility], Pt has appt on 2/21 will give more refills at that time, 4 tab(s) Oral daily  sildenafil 20 mg oral tablet: = 2.5 tab(s), Oral, daily, Instructions: 30-60 MINUTES PRIOR TO SEXUAL ACTIVITY., PRN: AS NEEDED, # 10 tab(s), 10 Refill(s), SHADE, Type: Maintenance, Pharmacy: [facility], Has appt 2/21 will give more refills at that time, 2.5 tab(s) Oral daily,PRN:A...  triamcinolone 0.1% topical cream: 1 nini, TOP, bid, Instructions: use for no more than 2wks at a time, # 60 gm, 1 Refill(s), Type: Maintenance, Pharmacy: [facility], 1 nini top bid,Instr:use for no more than 2wks at a  time  Documented Medications  Documented  Vitamin C: Vitamin C, See Instructions, Instructions: 1000mg tablet po bid, Supply, 0 Refill(s), Type: Maintenance  acetaminophen-oxycodone 325 mg-5 mg oral tablet: 1 tab(s), po, daily, PRN: as needed for pain, 0 Refill(s), Type: Maintenance  acetaminophen: ( 325 mg ), po, q4 hrs, PRN: as needed for pain, 0 Refill(s), Type: Maintenance  biotin 300 mcg oral tablet: 1 tab(s) ( 300 mcg ), po, daily, 0 Refill(s), Type: Maintenance  ibuprofen 600 mg oral tablet: 1 tab(s) ( 600 mg ), po, daily, PRN: as needed for pain, 0 Refill(s), Type: Maintenance  modafinil 200 mg oral tablet: 1 tab(s) ( 200 mg ), po, qam, Instructions: PRN, 0 Refill(s), Type: Maintenance  venlafaxine 75 mg oral capsule, extended release: 1 cap(s) ( 75 mg ), PO, Daily, # 30 cap(s), 0 Refill(s), Type: Maintenance   Problem list:    All Problems  Colon Polyps / SNOMED CT 461077994 / Confirmed  BPH (benign prostatic hypertrophy) / SNOMED CT 898710768 / Confirmed  Diabetes mellitus / SNOMED CT 648056335 / Confirmed  Refusal of statin medication by patient / SNOMED CT 599451083 / Confirmed  Resolved: *Hospitalized@[facility] - Sessile serrated adenomas  Resolved: Tobacco user / ICD-9-.1      Histories   Procedure history:    Cardiac computed tomography for calcium scoring (SNOMED CT 7802260473) on 7/2/2018 at 67 Years.  Comments:  7/5/2018 9:26 AM CDT - Kailee Woodward  Total Agatston calcium score is 393  Colonoscopy (SNOMED CT 178706517) performed by Kolton Majano MD on 11/21/2017 at 67 Years.  Comments:  11/29/2017 3:20 PM CST - Larissa Romano  Sedation:  Fentanyl and Versed  Negative for microscopic colitis  F/u in 5 years  Right hemicolectomy (SNOMED CT 899289947) performed by Nitesh Whitehead MD on 6/26/2014 at 63 Years.  Colonoscopy (SNOMED CT 901612559) performed by Shira Christian MD on 1/27/2009 at 58 Years.  Biopsy of prostate x 3 (SNOMED CT 487336184) in 2008 at 58 Years.  Repair of tendon of upper  limb (SNOMED CT 142291531) on 2002 at 51 Years.  Extraction of wisdom tooth (SNOMED CT 241079388) in  at 21 Years.     Family History: No colon cancer or polyps on Dad side but mom side unknown, Mom  age 93 from stroke/CHF. Dad  heart attack age 65 and had CABG in 50's. Brother is healthy  Social History: Retired [employer]  (33 years). Currently chewing 2-3 cans a week down from 4-5. Single (never ) and no children      Physical Examination   Vital Signs   2019 3:02 PM CST Temperature Tympanic 97.9 DegF    Peripheral Pulse Rate 72 bpm    Pulse Site Radial artery    HR Method Manual    Systolic Blood Pressure 128 mmHg    Diastolic Blood Pressure 80 mmHg    Mean Arterial Pressure 96 mmHg    BP Site Right arm    BP Method Manual      Measurements from flowsheet : Measurements   2019 3:02 PM CST Height Measured - Standard 67 in    Weight Measured - Standard 162 lb    BSA 1.86 m2    Body Mass Index 25.37 kg/m2  HI      General:  Alert and oriented, No acute distress.    Respiratory:  Lungs are clear to auscultation.    Cardiovascular:  Normal rate, Regular rhythm, No edema.    Musculoskeletal:  Normal gait.    Neurologic: normal monofilament testing (2019)  Psychiatric: word recall immediate 3/3 and delayed 3/3  Extremities: No leg swelling      Review / Management   Cardiac Calcium score (2018): 393pts with 77th percentile         Impression and Plan   Diagnosis     Chronic diarrhea (TNF38-EN K52.9).     History of anemia (OWS68-DL Z86.2).     Diabetes Mellitus (RJD51-JG E11.9).     Pruritus, Perianal (UER60-YM L29.0).       Diabetes: improvement with addition of bromocriptine but now HgbA1c 7.4% (2019). Continue metformin 2000mg daily. Increase bromocriptine to 1.6mg daily. Discussed and declines statin given diabetes even with low LDL (46). Taking aspirin.    Anal pruritis: lidocaine has worked well. Seldom needs anymore  Diarrhea: continue lomotil 2-3x daily  (December 2019)  Anemia: has corrected with normal iron stores. Will continue to monitor CBC  Colon cancer screening: had right hemicolectomy for large polyps 2014. Normal colonoscopy 2009. Two colonoscopy prior to 2009. Normal colonoscopy November 2017 and repeat in 2022  Asbestosis exposure: [place] in the . CXR 2017 negative and repeat 2020  [facility]  Eczema in the winter and triamcinolone helps  PSA 0.70-1.42 since 2012  Refilled viagra (declines STD testing), lomotil, metformin, bromocriptine February 2019  Renewed medications December 2019    Reviewed [place] [facility] [place] Monitoring [place] (June 2019)    Addendum 3/17: Patient dropped letter off requesting Hydroxychloroquine because of Coronavirus. He stated he has no symptoms. Talked to him and stated no guidelines at this time suggesting treating with Hydroxychloroquine

## 2022-02-16 NOTE — TELEPHONE ENCOUNTER
Entered by Anahi Castano RN on December 16, 2021 9:21:02 AM CST  ---------------------  From: Anahi Castano RN   To: [facility]    Sent: 12/16/2021 9:21:02 AM CST  Subject: Medication Management     ** Submitted: **  Complete:venlafaxine (venlafaxine 75 mg oral capsule, extended release)   Signed by Anahi Castano RN  12/16/2021 3:21:00 PM [facility]    ** Approved with modifications: **  venlafaxine (VENLAFAXINE HYDROCHLORIDE ER 75MG ER CAPSULE ER 24HR)  TAKE ONE CAPSULE BY MOUTH ONCE DAILY  Qty:  95 cap(s)        Days Supply:  95        Refills:  1          Substitutions Allowed     Route To Pharmacy - [facility]   Signed by Anahi Castano RN              ** Patient matched by Anahi Castano RN on 12/16/2021 9:19:43 AM CST **      ------------------------------------------  From: [employer]  To: Kolton Majano MD  Sent: December 13, 2021 4:14:45 PM CST  Subject: Medication Management  Due: November 16, 2021 3:37:48 PM CST     ** On Hold Pending Signature **     Drug: venlafaxine (Effexor XR 75 mg oral capsule, extended release), TAKE ONE CAPSULE BY MOUTH ONCE DAILY  Quantity: 95 cap(s)  Days Supply: 95  Refills: 1  Substitutions Allowed  Notes from Pharmacy:     Dispensed Drug: venlafaxine (venlafaxine 75 mg oral capsule, extended release), TAKE ONE CAPSULE BY MOUTH ONCE DAILY  Quantity: 95 cap(s)  Days Supply: 95  Refills: 1  Substitutions Allowed  Notes from Pharmacy:  ------------------------------------------Medication Refill    PCP:  ASHVIN    Name of med requested:  Venlafaxine    Date of last office visit and reason:  11/2/21 med check GJM  Date of last Med Check / Px:   11/2/21    Date of last labs pertaining to med:  10/30/21    RTC order in chart:      For Protocol refill, has patient been contacted:

## 2022-02-16 NOTE — TELEPHONE ENCOUNTER
Entered by Marilynn Parekh LPN on June 07, 2019 10:42:17 AM CDT  ---------------------  From: Marilynn Parekh LPN   To: [facility]    Sent: 6/7/2019 10:42:17 AM CDT  Subject: Medication Management     ** Not Approved: Refill not appropriate, Rx sent 6/6/19 **  atropine-diphenoxylate (DIPHEN/ATROP TAB  TABLET)  TAKE 1 OR 2 TABLETS BY MOUTH FOUR TIMES A DAY  Qty:  180 unknown unit        Days Supply:  0        Refills:  0          Substitutions Allowed     Route To Pharmacy - [facility]   Signed by Marilynn Parekh LPN            ------------------------------------------  From: [facility]  To: Kolton Majano MD  Sent: June 5, 2019 4:12:53 PM CDT  Subject: Medication Management  Due: June 6, 2019 4:12:53 PM CDT    ** On Hold Pending Signature **  Drug: atropine-diphenoxylate (Lomotil 2.5 mg-0.025 mg oral tablet)  TAKE 1 OR 2 TABLETS BY MOUTH FOUR TIMES A DAY  Quantity: 180 unknown unit Days Supply: 0         Refills: 0  Substitutions Allowed  Notes from Pharmacy:     Dispensed Drug: atropine-diphenoxylate (atropine-diphenoxylate 0.025 mg-2.5 mg oral tablet)  TAKE 1 OR 2 TABLETS BY MOUTH FOUR TIMES A DAY  Quantity: 180 unknown unit Days Supply: 0         Refills: 0  Substitutions Allowed  Notes from Pharmacy:   ------------------------------------------

## 2022-02-16 NOTE — LETTER
(Inserted Image. Unable to display)   [address] [place] [address]  April 07, 2021      NOY CRUZ      [address] [place] [address]        Dear NOY,    Thank you for selecting [facility] for your healthcare needs.  Below you will find the results of your recent tests done at our clinic.     Your diabetes test (HgbA1c) is 7.4% with goal of < 8.0% increased from 6.6% (make appointment to discuss)  Lyme test is negative.      Result Name Current Result Previous Result Reference Range   Hgb A1c ((H)) 7.4 4/6/2021 ((H)) 6.6 9/21/2020  ((H)) 8.4 6/11/2020  - <5.7   Lyme Ab IgG/IgM WB  <0.90 4/6/2021  <0.90 6/11/2020        Please contact me or my assistant at [phone] if you have any questions about your results.     Sincerely,        Kolton Majano MD        What do your labs mean?  Below is a glossary of commonly ordered labs:  LDL   Bad Cholesterol   HDL   Good Cholesterol  AST/ALT   Liver Function   Cr/Creatinine   Kidney Function  Microalbumin   Kidney Function  BUN   Kidney Function  PSA   Prostate    TSH   Thyroid Hormone  HgbA1c   Diabetes Test   Hgb (Hemoglobin)   Red Blood Cells

## 2022-02-16 NOTE — TELEPHONE ENCOUNTER
---------------------  From: Aruna Mendosa LPN (Phone Messages Pool ([id number]))   To: [employer] Message Pool ([address] [place] [address]);     Sent: 11/5/2019 11:01:19 AM CST  Subject: FYI - Appt.       FYI-    10:42am Pt calls and LM stating that he is going to be out of town this weekend. Pt cancelled his appt. that was scheduled for Monday and has rescheduled for 12/19/19. He mentions that he would like to have a PSA level checked.     Nadeem  [phone]---------------------  From: Martina Lin CMA ([facility] Message Pool ([id number]))   To: Kolton Majano MD;     Sent: 11/5/2019 1:40:44 PM CST  Subject: FW: MUSTAPHA - Appt.---------------------  From: Kolton Majano MD   To: [employer] Message Pool ([id number]); Martina Lin CMA;     Sent: 11/5/2019 2:03:19 PM CST  Subject: RE: FYI - Appt.     Let him know due for HgbA1c but PSA not due until February (normal February 2019)Patient notified and will wait.

## 2022-02-16 NOTE — PROGRESS NOTES
Patient:   NOY CRUZ            MRN: [id number]            FIN: [id number]               Age:   66 years     Sex:  Male     :  1950   Associated Diagnoses:   BCC (basal cell carcinoma of skin); Ear lesion   Author:   Wing Vasquez MD      Chief Complaint   2017 3:30 PM CDT    Mole/skin concern on right temple.  Ears itching      History of Present Illness   chief complaint and symptoms as noted above confirmed with patient   Lesion is been there for the past few months getting bigger.       Review of Systems   Constitutional:  Negative except as documented in history of present illness.    Ear/Nose/Mouth/Throat:  Negative except as documented in history of present illness.    Integumentary:  Negative except as documented in history of present illness.    Neurologic:  Negative.       Health Status   Allergies:    Allergic Reactions (Selected)  No known allergies   Medications:  (Selected)   Prescriptions  Prescribed  Lomotil 2.5 mg-0.025 mg oral tablet: 1-2 tab(s), po, qid, # 120 tab(s), 5 Refill(s), Type: Maintenance, Pharmacy: [facility] [place], 1-2 tab(s) po qid  Prevalite 4 g/5.5 g oral powder for reconstitution: ( 4 gm ), po, daily, # 90 EA, 3 Refill(s), Type: Maintenance, Pharmacy: [facility], 4 gm po daily  Viagra 100 mg oral tablet: 0.5 tab(s) ( 50 mg ), po, daily, Instructions: take when needed., # 10 tab(s), 3 Refill(s), Type: Maintenance, Pharmacy: [facility], 0.5 tab(s) po daily,Instr:take when needed.  lidocaine 5% topical ointment: 1 nini, top, tid, # 35 gm, 3 Refill(s), Type: Maintenance, Pharmacy: [facility], 1 nini top tid  metFORMIN 500 mg oral tablet, extended release: 3 tab(s) ( 1,500 mg ), po, daily, # 280 tab(s), 3 Refill(s), Type: Maintenance, Pharmacy: [facility], Pt is overdue for DM check and labs--must be seen for further refills., 3 tab(s) po daily  triamcinolone 0.1% topical cream: 1 nini,  TOP, bid, Instructions: use for no more than 2wks at a time, # 60 gm, 1 Refill(s), Type: Maintenance, Pharmacy: [facility], 1 nini top bid,Instr:use for no more than 2wks at a time  Documented Medications  Documented  Avodart 0.5 mg oral capsule: 1 cap(s) ( 0.5 mg ), PO, Daily, # 30 cap(s), 0 Refill(s), Type: Maintenance  Vitamin C: Vitamin C, See Instructions, Instructions: 1000mg tablet po bid, Supply, 0 Refill(s), Type: Maintenance  acetaminophen-oxycodone 325 mg-5 mg oral tablet: 1 tab(s), po, daily, PRN: as needed for pain, 0 Refill(s), Type: Maintenance  acetaminophen: ( 325 mg ), po, q4 hrs, PRN: as needed for pain, 0 Refill(s), Type: Maintenance  biotin 300 mcg oral tablet: 1 tab(s) ( 300 mcg ), po, daily, 0 Refill(s), Type: Maintenance  ibuprofen 600 mg oral tablet: 1 tab(s) ( 600 mg ), po, daily, PRN: as needed for pain, 0 Refill(s), Type: Maintenance  modafinil 200 mg oral tablet: 1 tab(s) ( 200 mg ), po, qam, Instructions: PRN, 0 Refill(s), Type: Maintenance  venlafaxine 75 mg oral capsule, extended release: 1 cap(s) ( 75 mg ), PO, Daily, # 30 cap(s), 0 Refill(s), Type: Maintenance   Problem list:    All Problems (Selected)  Diabetes Mellitus / ICD-9-.00 / Confirmed  BPH / ICD-9-.00 / Confirmed  Colon Polyps / SNOMED CT 757332726 / Confirmed      Histories   Past Medical History:    Active  Diabetes Mellitus (250.00)  BPH (600.00)  Resolved  *Hospitalized@[facility] - Sessile serrated adenomas: Onset on 2014 at 63 years.  Resolved on 2014 at 63 years.  Tobacco user (305.1):  Resolved.   Family History:    Heart failure  Mother (Aretha, )  Stroke  Mother (Aretha, )  CABG - Coronary artery bypass graft  Father (Justin, )     Procedure history:    Right hemicolectomy (SNOMED CT 596373653) performed by Nitesh Whitehead MD on 2014 at 63 Years.  Colonoscopy (SNOMED CT 557972944) performed by Shira Christian MD on 2009 at 58 Years.  Biopsy of prostate  x 3 (SNOMED CT 010345535) in 2008 at 58 Years.  Repair of tendon of upper limb (SNOMED CT 349578205) on 2/13/2002 at 51 Years.  Extraction of wisdom tooth (SNOMED CT 440299784) in 1971 at 21 Years.   Social History:        Alcohol Assessment            Current, Beer (12 oz), 1-2 times per month, 2 drinks/episode average.      Tobacco Assessment            Current, Snuff - 1/2 can per day, 25 year(s).      Substance Abuse Assessment            Never      Employment and Education Assessment            Retired, Work/School description: Lt. Col., [employer].      Exercise and Physical Activity Assessment: Occasional exercise            Exercise frequency: 1-2 times/week.  Exercise type: Walking, Weight lifting.      Sexual Assessment            Sexually active: No.  Sexual orientation: Heterosexual.        Physical Examination   Vital Signs   5/30/2017 3:30 PM CDT Temperature Tympanic 97.9 DegF    Peripheral Pulse Rate 80 bpm    Systolic Blood Pressure 124 mmHg    Diastolic Blood Pressure 78 mmHg    Mean Arterial Pressure 93 mmHg      Measurements from flowsheet : Measurements   5/30/2017 3:30 PM CDT    Weight Measured - Standard                163.8 lb     General:  Mild distress.    Integumentary:  He has .4cmcentimeter elevated tannish lesion appears to be a basal cell just in front of his right ear with a seborrheic keratosis in front of that frozen x3 with liquid nitrogen  Small elevated lesion inside left tragus  .    Neurologic:  Alert, Oriented.       Impression and Plan   Diagnosis     BCC (basal cell carcinoma of skin) (CGQ95-XC C44.91).     Ear lesion (PHG29-YF H61.899).     Course:  Progressing as expected.    Plan:  Skin care reviewed send to ENT for the ear lesion otherwise recheck in a month if he has any remnants of the facial lesion left. .    Patient Instructions:       Counseled: Patient, Regarding diagnosis.

## 2022-02-16 NOTE — TELEPHONE ENCOUNTER
---------------------  From: Marilynn Parekh LPN (eRx Pool ([facility]))   To: [facility] [place] ([facility]);     Sent: 6/30/2021 11:49:36 AM CDT  Subject: FW: Medication Management   Due Date/Time: 6/30/2021 12:06:00 PM CDT     Medication Refill needing approval    PCP:   ASHVIN    Medication:   Percocet 5/325 1 tab PO q 4hrs PRN  Last Filled:  4/12/21    Quantity:  15  Refills:  0    Medication:   bromocriptine 0.8mg 3 tabs PO qam  Last Filled:  6/24/20    Quantity:  95  Refills:  3    Medication:   dutasteride 0.5mg 1 cap PO daily  Last Filled:  6/24/20    Quantity:  95  Refills:  3    CSA on file?   No    Date of last office visit and reason:   6/9/21 with KAH  Date of last labs pertaining to condition:  n/a    Note:  Please advise on refills. GJM out of clinic until 7/12.  Per GJM note in April pt given 15 tabs of pain medication to last 3 months.    Return to Clinic order placed?  due July 2021 for labs    Resource:   pharmacy        ** Patient matched by Marilynn Parekh LPN on 6/30/2021 11:39:01 AM CDT **        ------------------------------------------  From: [employer]  To: Kolton Majano MD  Sent: June 29, 2021 12:06:12 PM CDT  Subject: Medication Management  Due: June 4, 2021 8:25:53 PM CDT     ** On Hold Pending Signature **     Drug: bromocriptine (Cycloset 0.8 mg oral tablet), TAKE THREE TABLETS BY MOUTH EVERY MORNING - DOSE INCREASE 6/24/2020  Quantity: 280 EA  Days Supply: 93  Refills: 0  Substitutions Allowed  Notes from Pharmacy:     Dispensed Drug: bromocriptine (Cycloset 0.8 mg oral tablet), TAKE THREE TABLETS BY MOUTH EVERY MORNING - DOSE INCREASE 6/24/2020  Quantity: 280 EA  Days Supply: 93  Refills: 0  Substitutions Allowed  Notes from Pharmacy:     ** On Hold Pending Signature **     Drug: acetaminophen-oxycodone (acetaminophen-oxycodone 325 mg-5 mg oral tablet), TAKE 1 TABLET BY MOUTH EVERY FOUR HOURS AS NEEDED FOR PAIN  Quantity: 15 EA  Days Supply:  2  Refills: 0  Substitutions Allowed  Notes from Pharmacy:     Dispensed Drug: acetaminophen-oxycodone (acetaminophen-oxycodone 325 mg-5 mg oral tablet), TAKE 1 TABLET BY MOUTH EVERY FOUR HOURS AS NEEDED FOR PAIN  Quantity: 15 EA  Days Supply: 2  Refills: 0  Substitutions Allowed  Notes from Pharmacy:     ** On Hold Pending Signature **     Drug: dutasteride (Avodart 0.5 mg oral capsule), TAKE ONE CAPSULE BY MOUTH ONCE DAILY  Quantity: 95 EA  Days Supply: 95  Refills: 0  Substitutions Allowed  Notes from Pharmacy:     Dispensed Drug: dutasteride (dutasteride 0.5 mg oral capsule), TAKE ONE CAPSULE BY MOUTH ONCE DAILY  Quantity: 95 EA  Days Supply: 95  Refills: 0  Substitutions Allowed  Notes from Pharmacy:  ------------------------------------------  ** Submitted: **  Complete:bromocriptine (bromocriptine 0.8 mg oral tablet)   Signed by Misael Crow PA-C  6/30/2021 5:55:00 PM [facility]    ** Approved with modifications: **  bromocriptine (CYCLOSET 0.8MG TABLET)  TAKE THREE TABLETS BY MOUTH EVERY MORNING - DOSE INCREASE 6/24/2020  Qty:  280 EA        Days Supply:  93        Refills:  0          Substitutions Allowed     Route To Pharmacy - [facility]   Signed by Misale Crow PA-C  ** Submitted: **  Complete:dutasteride (Avodart 0.5 mg oral capsule)   Signed by Misael Crow PA-C  6/30/2021 5:57:00 PM [facility]    ** Approved with modifications: **  dutasteride (DUTASTERIDE 0.5MG CAPSULE)  TAKE ONE CAPSULE BY MOUTH ONCE DAILY  Qty:  95 EA        Days Supply:  95        Refills:  0          Substitutions Allowed     Route To Pharmacy - [facility]   Signed by Misael Crow PA-CAll Rxs refilled (#15 percocet refilled), [facility] consulted, no irregularities noted---------------------  From: Misael Crow PA-C   To: [facility]    Sent: 6/30/2021 12:58:00 PM CDT  Subject: FW: Medication Management     ** Not Approved: filled by alternate method **  acetaminophen-oxycodone (OXYCODONE/ACETAMINOPHEN 5-325MG TABLET)   TAKE 1 TABLET BY MOUTH EVERY FOUR HOURS AS NEEDED FOR PAIN  Qty:  15 EA        Days Supply:  2        Refills:  0          Substitutions Allowed     Route To Pharmacy - [facility]   Signed by Tristin MORELOS, Misael CHAKRABORTY

## 2022-02-24 ENCOUNTER — AMBULATORY - RIVER FALLS (OUTPATIENT)
Dept: FAMILY MEDICINE | Facility: CLINIC | Age: 72
End: 2022-02-24
Payer: MEDICARE

## 2022-02-25 ENCOUNTER — COMMUNICATION - RIVER FALLS (OUTPATIENT)
Dept: FAMILY MEDICINE | Facility: CLINIC | Age: 72
End: 2022-02-25
Payer: MEDICARE

## 2022-02-25 LAB — HBA1C MFR BLD: 7.8 %

## 2022-02-28 ENCOUNTER — OFFICE VISIT (OUTPATIENT)
Dept: FAMILY MEDICINE | Facility: CLINIC | Age: 72
End: 2022-02-28
Payer: MEDICARE

## 2022-02-28 VITALS
WEIGHT: 169 LBS | OXYGEN SATURATION: 99 % | BODY MASS INDEX: 26.53 KG/M2 | SYSTOLIC BLOOD PRESSURE: 120 MMHG | DIASTOLIC BLOOD PRESSURE: 60 MMHG | TEMPERATURE: 97.6 F | HEART RATE: 77 BPM | HEIGHT: 67 IN

## 2022-02-28 DIAGNOSIS — G89.29 OTHER CHRONIC PAIN: ICD-10-CM

## 2022-02-28 DIAGNOSIS — N52.9 ERECTILE DYSFUNCTION, UNSPECIFIED ERECTILE DYSFUNCTION TYPE: ICD-10-CM

## 2022-02-28 DIAGNOSIS — F41.1 GENERALIZED ANXIETY DISORDER: ICD-10-CM

## 2022-02-28 DIAGNOSIS — N40.0 BENIGN PROSTATIC HYPERPLASIA, UNSPECIFIED WHETHER LOWER URINARY TRACT SYMPTOMS PRESENT: ICD-10-CM

## 2022-02-28 DIAGNOSIS — E11.9 TYPE 2 DIABETES MELLITUS WITHOUT COMPLICATION, WITHOUT LONG-TERM CURRENT USE OF INSULIN (H): Primary | ICD-10-CM

## 2022-02-28 DIAGNOSIS — L29.0 ANAL PRURITUS: ICD-10-CM

## 2022-02-28 DIAGNOSIS — G47.33 OSA (OBSTRUCTIVE SLEEP APNEA): ICD-10-CM

## 2022-02-28 DIAGNOSIS — R19.7 DIARRHEA, UNSPECIFIED TYPE: ICD-10-CM

## 2022-02-28 PROCEDURE — 99214 OFFICE O/P EST MOD 30 MIN: CPT | Performed by: EMERGENCY MEDICINE

## 2022-02-28 RX ORDER — SILDENAFIL 100 MG/1
100 TABLET, FILM COATED ORAL DAILY PRN
Qty: 30 TABLET | Refills: 3 | Status: SHIPPED | OUTPATIENT
Start: 2022-02-28 | End: 2022-05-12

## 2022-02-28 RX ORDER — GLIMEPIRIDE 2 MG/1
2 TABLET ORAL
Qty: 95 TABLET | Refills: 0 | Status: SHIPPED | OUTPATIENT
Start: 2022-02-28 | End: 2022-05-12

## 2022-02-28 RX ORDER — DUTASTERIDE 0.5 MG/1
1 CAPSULE, LIQUID FILLED ORAL DAILY
Qty: 95 CAPSULE | Refills: 3 | Status: SHIPPED | OUTPATIENT
Start: 2022-02-28 | End: 2022-04-20

## 2022-02-28 RX ORDER — VENLAFAXINE HYDROCHLORIDE 75 MG/1
1 CAPSULE, EXTENDED RELEASE ORAL DAILY
COMMUNITY
Start: 2021-12-16 | End: 2022-02-28

## 2022-02-28 RX ORDER — VENLAFAXINE HYDROCHLORIDE 75 MG/1
75 CAPSULE, EXTENDED RELEASE ORAL DAILY
Qty: 95 CAPSULE | Refills: 3 | Status: SHIPPED | OUTPATIENT
Start: 2022-02-28 | End: 2022-05-12

## 2022-02-28 RX ORDER — MODAFINIL 200 MG/1
1 TABLET ORAL EVERY MORNING
COMMUNITY
Start: 2020-12-28 | End: 2022-02-28

## 2022-02-28 RX ORDER — TRIAMCINOLONE ACETONIDE 1 MG/G
CREAM TOPICAL 2 TIMES DAILY PRN
Qty: 30 G | Refills: 3 | Status: SHIPPED | OUTPATIENT
Start: 2022-02-28 | End: 2022-05-12

## 2022-02-28 RX ORDER — MODAFINIL 200 MG/1
200 TABLET ORAL EVERY MORNING
Qty: 30 TABLET | Refills: 3 | Status: SHIPPED | OUTPATIENT
Start: 2022-02-28 | End: 2022-05-12

## 2022-02-28 RX ORDER — DUTASTERIDE 0.5 MG/1
1 CAPSULE, LIQUID FILLED ORAL DAILY
COMMUNITY
Start: 2021-11-02 | End: 2022-02-28

## 2022-02-28 RX ORDER — METFORMIN HCL 500 MG
4 TABLET, EXTENDED RELEASE 24 HR ORAL DAILY
COMMUNITY
Start: 2021-11-30 | End: 2022-02-28

## 2022-02-28 RX ORDER — OXYCODONE AND ACETAMINOPHEN 5; 325 MG/1; MG/1
1 TABLET ORAL EVERY 4 HOURS PRN
Qty: 15 TABLET | Refills: 0 | Status: SHIPPED | OUTPATIENT
Start: 2022-02-28 | End: 2022-05-12

## 2022-02-28 RX ORDER — LIDOCAINE 50 MG/G
OINTMENT TOPICAL 3 TIMES DAILY PRN
Qty: 15 G | Refills: 3 | Status: SHIPPED | OUTPATIENT
Start: 2022-02-28

## 2022-02-28 RX ORDER — METFORMIN HCL 500 MG
2000 TABLET, EXTENDED RELEASE 24 HR ORAL DAILY
Qty: 370 TABLET | Refills: 3 | Status: SHIPPED | OUTPATIENT
Start: 2022-02-28 | End: 2022-05-12

## 2022-02-28 RX ORDER — SILDENAFIL 100 MG/1
100 TABLET, FILM COATED ORAL PRN
COMMUNITY
Start: 2021-10-08 | End: 2022-02-28

## 2022-02-28 RX ORDER — BROMOCRIPTINE MESYLATE 0.8 MG/1
2.4 TABLET ORAL EVERY MORNING
Qty: 270 TABLET | Refills: 3 | Status: SHIPPED | OUTPATIENT
Start: 2022-02-28 | End: 2022-05-12

## 2022-02-28 RX ORDER — BROMOCRIPTINE MESYLATE 0.8 MG/1
3 TABLET ORAL EVERY MORNING
COMMUNITY
Start: 2022-02-01 | End: 2022-02-28

## 2022-02-28 RX ORDER — OXYCODONE AND ACETAMINOPHEN 5; 325 MG/1; MG/1
1 TABLET ORAL EVERY 4 HOURS PRN
COMMUNITY
Start: 2021-12-10 | End: 2022-02-28

## 2022-02-28 RX ORDER — LIDOCAINE 50 MG/G
1 OINTMENT TOPICAL 3 TIMES DAILY
COMMUNITY
End: 2022-02-28

## 2022-02-28 RX ORDER — IBUPROFEN 600 MG/1
600 TABLET, FILM COATED ORAL PRN
COMMUNITY

## 2022-02-28 RX ORDER — DIPHENOXYLATE HCL/ATROPINE 2.5-.025MG
1 TABLET ORAL 4 TIMES DAILY PRN
Qty: 95 TABLET | Refills: 3 | Status: SHIPPED | OUTPATIENT
Start: 2022-02-28 | End: 2022-05-12

## 2022-02-28 NOTE — PROGRESS NOTES
Nadeem EMANUEL [place] 71 year old presents with Left eye irritation for 3 weeks. Concerned might have sand causing a scratch.    Had Coronavirus 2020. Fatigue post infection slowly improving. Started Bromocriptine 2017 for diabetes. No significant side effects. Eating lots of fruits.    Chewing rarely. Using nicorrette. Wants to quit and concerned that chewing could have contributed to the colon polyps. Chews copehagen 2-3 cans a week. Trying nicorrette. Chewing for 30 years. Used an herbal supplement and gum. Using nicorette lozenges.    Had hemorrhoids surgically fixed . Continues to have pain and itching intermittently but less frequently. When has rectal pain 1x a week triamcinolone 0.1% cream and lidocaine helps.    Had loose stools 3-4x a day and took lomotil 3 tabs a day. Now stools have improved and needs lomotil about once a day (cholestyramine too constipation even 1/2 scoop). Now has one stool usually formed a day. Loose stools started following the right hemicolectomy .    Takes percocet about 5-6 tabs a month for various chronic pains. [facility] (Dr Mejia) but no longer  Uses tylenol daily for lower back pain and neck pain  Diabetes diagnosed in .    Everything hurts when walks a mile. Most pain in left achilles since injury .  Weight is stable  Takes modafinil for sleep apnea and prescribed by [facility] when travelling  Takes effexor  Prostate 8.5 in  with normal biopsies  [place] jump with injury in neck and has had chronic issues and chronic pain. Has had cervical injection  Taking Effexor since  for anxiety and depression (knew some people  in [place] on )  Taking modafinil since  giving sleepiness from sleep apnea. Uses it with driving.        Review of Systems:  Respiratory:  No shortness of breath.    Cardiovascular:  No chest pain.     PHQ-9: 2pts (2019). 7pts (2017). 2pts (February  "2016)  Eczema in the winter  CAGE: 0pts and minimal alcohol    No history of DVT or PE  No history of blood transfusions  No anesthesia problems with patient or family  No chest pain or shortness of breath with walking up a flight of stairs.    Current Outpatient Medications   Medication Sig Dispense Refill     CYCLOSET 0.8 MG TABS tablet Take 3 tablets (2.4 mg) by mouth every morning 270 tablet 3     diphenoxylate-atropine (LOMOTIL) 2.5-0.025 MG tablet Take 1 tablet by mouth 4 times daily as needed for diarrhea 95 tablet 3     dutasteride (AVODART) 0.5 MG capsule Take 1 capsule (0.5 mg) by mouth daily 95 capsule 3     glimepiride (AMARYL) 2 MG tablet Take 1 tablet (2 mg) by mouth every morning (before breakfast) 95 tablet 0     lidocaine (XYLOCAINE) 5 % external ointment Apply topically 3 times daily as needed for moderate pain (anal pruritis) 15 g 3     metFORMIN (GLUCOPHAGE-XR) 500 MG 24 hr tablet Take 4 tablets (2,000 mg) by mouth daily 370 tablet 3     modafinil (PROVIGIL) 200 MG tablet Take 1 tablet (200 mg) by mouth every morning 30 tablet 3     oxyCODONE-acetaminophen (PERCOCET) 5-325 MG tablet Take 1 tablet by mouth every 4 hours as needed 15 tablet 0     sildenafil (VIAGRA) 100 MG tablet Take 1 tablet (100 mg) by mouth daily as needed 30 tablet 3     triamcinolone (KENALOG) 0.1 % external cream Apply topically 2 times daily as needed for irritation (anal pruritis) 30 g 3     venlafaxine (EFFEXOR-XR) 75 MG 24 hr capsule Take 1 capsule (75 mg) by mouth daily 95 capsule 3     acetaminophen-caff-pyrilamine (MIDOL MENSTRUAL) 500-60-15 MG TABS Take 2 tablets by mouth every 6 hours as needed for mild pain       ibuprofen (ADVIL/MOTRIN) 600 MG tablet Take 600 mg by mouth as needed         /60 (BP Location: Right arm, Patient Position: Sitting)   Pulse 77   Temp 97.6  F (36.4  C) (Tympanic)   Ht 1.702 m (5' 7\")   Wt 76.7 kg (169 lb)   SpO2 99%   BMI 26.47 kg/m    General: no acute " distress  Musculoskeletal: normal gait  Lungs: Clear  Heart: Regular rate and rhythm  Eyes: Right conjunctive is normal.      Procedure: Proparacaine drops given.  Eyelids everted.  No foreign body seen.  Fluorescein dye placed.  Nguyen lamp used to no corneal abrasion seen.    Assessment and Plan:    Diabetes: continue bromocriptine 2.4mg daily and add glimepiride with HgbA1c 7.8% (February 2022) increased from 7.4% October 2021. Recheck in 3 months. Continue metformin 2000mg daily. Discussed and declines statin given diabetes even with low LDL Taking aspirin.    Anal pruritis: lidocaine has worked well. Seldom needs anymore  Diarrhea: has decreased from 3 a day to 1 day Lomotil (December 2019)  Anemia: has corrected with normal iron stores. Will continue to monitor CBC  Colon cancer screening: had right hemicolectomy for large polyps 2014. Normal colonoscopy 2009. Two colonoscopy prior to 2009. Normal colonoscopy November 2017 and repeat in 2022    Asbestosis exposure: [place] in the . CXR 2017 and 2020 negative and repeat 2025  Immunizations: Pneumovax. Declines Coronavirus vaccine  Eczema in the winter and triamcinolone helps  Chronic knee pain: will give 15 tabs for 3 months ([facility])  Skin lesion: monitor  PSA 0.70-1.42 since 2012  Eye irritation: no corneal abrasion. Continue to monitor and follow up with Ophthalmology if not improving

## 2022-03-02 VITALS
BODY MASS INDEX: 26.39 KG/M2 | DIASTOLIC BLOOD PRESSURE: 76 MMHG | HEART RATE: 80 BPM | SYSTOLIC BLOOD PRESSURE: 126 MMHG | OXYGEN SATURATION: 98 % | TEMPERATURE: 97.4 F | WEIGHT: 168.5 LBS

## 2022-03-02 NOTE — LETTER
(Inserted Image. Unable to display)   February 02, 2022  NOY CRUZ  [address] [place] [address]        Dear NOY,    Thank you for selecting [facility] for your healthcare needs.    Our records indicate you are due for the following services:     Non-Fasting Labs    If you had your labs done at another facility or with Direct Access Lab Testing at [facility], please bring in a copy of the results to your next visit, mail a copy, or drop off a copy of your results to your Healthcare Provider.     (FYI   Regarding office visits: In some instances, a video visit or telephone visit may be offered as an option.)    To schedule an appointment or if you have further questions, please contact your clinic at [phone].    Powered by [employer]    Sincerely,    Kolton Majano MD

## 2022-03-02 NOTE — PROGRESS NOTES
Chief Complaint    Discuss modafiil - no longer working    Pt's weight 155# at home  History of Present Illness       Patient is here because of daytime sleepiness       He has had longstanding daytime sleepiness.  He also has obstructive sleep apnea.  Says he uses his CPAP regularly has not changed anything.  Sleeps through the night okay.  He is taking modafinil which she is taken for he estimates 15 years.  Just like a switch to the modafinil does not seem to be working anymore       He is taking naps he just does not feel real awake during the day       He has diabetes but he notes blood sugars been stable he is not aware of any hypoglycemia he has an A1c recently of 7.4       He also has had a diagnosis of lupus but has not been an active disease for him.       Patient did have coronavirus in February 2020 but resolved  Review of Systems       See HPI.  All other review of systems negative.  Physical Exam   Vitals & Measurements    T: 97.4  F (Tympanic)  HR: 80 (Peripheral)  BP: 126/76  SpO2: 98%     HT: 67 in  WT: 168.5 lb        Alert talkative       Supple neck no adenopathy       Lungs are clear       No joint swelling no peripheral edema  Assessment/Plan       1. Excessive daytime sleepiness (G47.19)          We discussed possible causes he is agreed to allow me to do a TSH along with some chemistry labs and CBC         Ordered:          Basic Metabolic Panel* ([facility]), Specimen Type: Serum, Collection Date: 02/02/22 11:40:00 CST          CBC (h/h, RBC, indices, WBC, Plt)* ([facility]), Specimen Type: Blood, Collection Date: 02/02/22 11:40:00 CST          TSH* ([facility]), Specimen Type: Serum, Collection Date: 02/02/22 11:40:00 CST                2. Obstructive sleep apnea (adult) (pediatric) (G47.33)          Patient has been using a mouthguard for many years and is very happy with it is very possible is not having enough affect and he may be having sleepiness that is no longer covered by his modafinil.  He is  agreed to do a home sleep test when using his mouthguard                3. Systemic lupus erythematosus, unspecified (M32.9)                4. Type 2 diabetes mellitus without complications (E11.9)           Patient Information     Name:NOY CRUZ      Address:      [facility] [address] [place], [place] [id number]     Sex:Male     YOB: 1950     [phone]     Emergency Contact:PRIYANKA CRUZ     MRN:[id number]     FIN:[id number]     Location:[facility]     Date of Service:02/02/2022      Primary Care Physician:       Kolton Majano MD, [phone]      Attending Physician:       Arnaldo Ivory MD, [phone]  Problem List/Past Medical History    Ongoing     Age-related osteoporosis without current pathological fracture     BPH (benign prostatic hypertrophy)     Cervicalgia     Colon Polyps     Dorsalgia, unspecified     History of anemia     Obstructive sleep apnea (adult) (pediatric)     Other intervertebral disc degeneration, lumbar region     Pain in left knee     Pain in right knee     Pain in unspecified shoulder     Pure hypercholesterolemia, unspecified     Refusal of statin medication by patient       Comments: Per 9/21/2017 provider note     Seasonal allergies     Spondylosis without myelopathy or radiculopathy, cervical region     Systemic lupus erythematosus, unspecified     Type 2 diabetes mellitus without complications     Unspecified osteoarthritis, unspecified site     Vitamin D deficiency, unspecified    Historical     *Hospitalized@[facility] - Sessile serrated adenomas     Tobacco user  Procedure/Surgical History     Cardiac computed tomography for calcium scoring (07/02/2018)      Comments: Total Agatston calcium score is 393.     Colonoscopy (11/21/2017)      Comments: Sedation:  Fentanyl and Versed      Negative for microscopic colitis      F/u in 5 years.     Right hemicolectomy (06/26/2014)     Colonoscopy (01/27/2009)     Biopsy of prostate x 3 (2008)      Repair of tendon of upper limb (02/13/2002)     Extraction of wisdom tooth (1971)  Medications    acetaminophen, 325 mg, Oral, q4 hrs, PRN    Atrovent 42 mcg/inh nasal spray, 2 spray(s), Nasal, qid, PRN, 2 refills    biotin 300 mcg oral tablet, 300 mcg= 1 tab(s), Oral, daily    Cycloset 0.8 mg oral tablet, 3 tab(s), Oral, qam, 3 refills    dutasteride 0.5 mg oral capsule, 1 cap(s), Oral, daily, 3 refills    ibuprofen 600 mg oral tablet, 600 mg= 1 tab(s), Oral, daily, PRN    lidocaine 5% topical ointment, 1 nini, Topical, tid, 3 refills    Lomotil 2.5 mg-0.025 mg oral tablet, 1 tab(s), Oral, tid, PRN, 1 refills    metFORMIN 500 mg oral tablet, extended release, 4 tab(s), Oral, daily, 3 refills    modafinil 200 mg oral tablet, 200 mg= 1 tab(s), Oral, qam    Percocet 5/325 oral tablet, 1 tab(s), Oral, q4 hrs, PRN    sildenafil 100 mg oral tablet, 100 mg= 1 tab(s), Oral, daily, 5 refills    triamcinolone 0.1% topical cream, 1 nini, Topical, bid, 1 refills    venlafaxine 75 mg oral capsule, extended release, See Instructions    Vitamin C, See Instructions  Allergies    No Known Medication Allergies  Social History    Smoking Status     Current some day smoker     Alcohol      Current, Beer (12 oz), 1-2 times per month, 2 drinks/episode average.     Electronic Cigarette/Vaping      Electronic Cigarette Use: Never.     Employment/School      Retired, Work/School description: Lt. Col., [employer].     Exercise      Exercise frequency: 1-2 times/week. Exercise type: Walking, Weight lifting.     Home/Environment      Marital status: Single. Lives with Self.     Sexual      Identifies as male     Substance Abuse - Denies Substance Abuse      Never     Tobacco - Past      some day chewing  Family History    CABG - Coronary artery bypass graft: Father.    Heart failure: Mother.    Stroke: Mother.    Brother: History is negative  Lab Results          Lab Results (Last 4 results within 90 days)           Sodium Level: 135 mmol/L [135  mmol/L - 146 mmol/L] (02/02/22 11:50:00)          Potassium Level: 4.9 mmol/L [3.5 mmol/L - 5.3 mmol/L] (02/02/22 11:50:00)          Chloride Level: 95 mmol/L Low [98 mmol/L - 110 mmol/L] (02/02/22 11:50:00)          CO2 Level: 31 mmol/L [20 mmol/L - 32 mmol/L] (02/02/22 11:50:00)          Glucose Level: 228 mg/dL High [65 mg/dL - 99 mg/dL] (02/02/22 11:50:00)          BUN: 15 mg/dL [7 mg/dL - 25 mg/dL] (02/02/22 11:50:00)          Creatinine Level: 0.87 mg/dL [0.7 mg/dL - 1.18 mg/dL] (02/02/22 11:50:00)          BUN/Creat Ratio: NOT APPLICABLE [6  - 22] (02/02/22 11:50:00)          eGFR: 87 mL/min/1.73m2 (02/02/22 11:50:00)          eGFR African American: 101 mL/min/1.73m2 (02/02/22 11:50:00)          Calcium Level: 9.6 mg/dL [8.6 mg/dL - 10.3 mg/dL] (02/02/22 11:50:00)          TSH: 1.19 mIU/L [0.4 mIU/L - 4.5 mIU/L] (02/02/22 11:50:00)          WBC: 5 [3.8  - 10.8] (02/02/22 11:50:00)          RBC: 4.35 [4.2  - 5.8] (02/02/22 11:50:00)          Hgb: 13.6 gm/dL [13.2 gm/dL - 17.1 gm/dL] (02/02/22 11:50:00)          Hct: 41.2 % [38.5 % - 50 %] (02/02/22 11:50:00)          MCV: 94.7 fL [80 fL - 100 fL] (02/02/22 11:50:00)          MCH: 31.3 pg [27 pg - 33 pg] (02/02/22 11:50:00)          MCHC: 33 gm/dL [32 gm/dL - 36 gm/dL] (02/02/22 11:50:00)          RDW: 12.4 % [11 % - 15 %] (02/02/22 11:50:00)          Platelet: 259 [140  - 400] (02/02/22 11:50:00)          MPV: 12 fL [7.5 fL - 12.5 fL] (02/02/22 11:50:00)  Immunizations          Scheduled Immunizations          Dose Date(s)          tetanus/diphth/pertuss (Tdap) adult/adol          01/02/2016          Other Immunizations          influenza virus vaccine, inactivated          03/12/2020

## 2022-03-02 NOTE — LETTER
(Inserted Image. Unable to display)   [address] [place] [address]  February 10, 2022        NOY CRUZ      [address] [place] [address]        Dear NOY,    Thank you for choosing [facility] for your healthcare needs. Below you will find the results of your recent test(s) done at our clinic.      Your thyroid is normal.   The hemoglobin is also normal.   No problems seen other than the high glucose as expected.      Result Name Current Result Previous Result Reference Range   Glucose Level (mg/dL) ((H)) 228 2/2/2022 ((H)) 183 10/30/2021 65 - 99   BUN (mg/dL)  15 2/2/2022  15 10/30/2021 7 - 25   Creatinine Level (mg/dL)  0.87 2/2/2022  0.91 10/30/2021 0.70 - 1.18   eGFR (mL/min/1.73m2)  87 2/2/2022  85 10/30/2021 > OR = 60 -    eGFR  (mL/min/1.73m2)  101 2/2/2022  99 10/30/2021 > OR = 60 -    Sodium Level (mmol/L)  135 2/2/2022  136 10/30/2021 135 - 146   Potassium Level (mmol/L)  4.9 2/2/2022  4.7 10/30/2021 3.5 - 5.3   Chloride Level (mmol/L) ((L)) 95 2/2/2022  99 10/30/2021 98 - 110   CO2 Level (mmol/L)  31 2/2/2022  31 10/30/2021 20 - 32   Calcium Level (mg/dL)  9.6 2/2/2022  9.5 10/30/2021 8.6 - 10.3   WBC  5.0 2/2/2022  5.8 10/30/2021 3.8 - 10.8   RBC  4.35 2/2/2022 ((L)) 4.19 10/30/2021 4.20 - 5.80   Hgb (gm/dL)  13.6 2/2/2022  13.7 10/30/2021 13.2 - 17.1   Hct (%)  41.2 2/2/2022  40.4 10/30/2021 38.5 - 50.0   MCV (fL)  94.7 2/2/2022  96.4 10/30/2021 80.0 - 100.0   MCH (pg)  31.3 2/2/2022  32.7 10/30/2021 27.0 - 33.0   MCHC (gm/dL)  33.0 2/2/2022  33.9 10/30/2021 32.0 - 36.0   RDW (%)  12.4 2/2/2022  11.7 10/30/2021 11.0 - 15.0   Platelet  259 2/2/2022  270 10/30/2021 140 - 400   MPV (fL)  12.0 2/2/2022  11.4 10/30/2021 7.5 - 12.5   TSH (mIU/L)  1.19 2/2/2022  0.72 6/11/2020 0.40 - 4.50       Please contact me or my assistant at [phone] if you have any questions or concerns.     Sincerely,        Arnaldo Ivory MD        What do  your labs mean?  Below is a glossary of commonly ordered labs:  LDL   Bad Cholesterol   HDL   Good Cholesterol  AST/ALT   Liver Function   Cr/Creatinine   Kidney Function  Microalbumin   Kidney Function  BUN   Kidney Function  PSA   Prostate    TSH   Thyroid Hormone  HgbA1c   Diabetes Test   Hgb (Hemoglobin)   Red Blood Cells

## 2022-03-02 NOTE — LETTER
(Inserted Image. Unable to display)   [address] [place] [address]  February 25, 2022      NOY CRUZ      [address] [place] [address]        Dear NOY,    Thank you for selecting [facility] for your healthcare needs.  Below you will find the results of your recent tests done at our clinic.     Your diabetes test (HgbA1c) is 7.8% with goal of < 8.0%. Please make an appointment to discuss. Could increase your bromocriptine to 4 tabs a day      Result Name Current Result Previous Result Reference Range   Hgb A1c ((H)) 7.8 2/24/2022 ((H)) 7.4 10/30/2021  ((H)) 7.4 4/6/2021  - <5.7       Please contact me or my assistant at [phone] if you have any questions about your results.     Sincerely,        Kolton Majano MD        What do your labs mean?  Below is a glossary of commonly ordered labs:  LDL   Bad Cholesterol   HDL   Good Cholesterol  AST/ALT   Liver Function   Cr/Creatinine   Kidney Function  Microalbumin   Kidney Function  BUN   Kidney Function  PSA   Prostate    TSH   Thyroid Hormone  HgbA1c   Diabetes Test   Hgb (Hemoglobin)   Red Blood Cells

## 2022-03-14 ENCOUNTER — TELEPHONE (OUTPATIENT)
Dept: FAMILY MEDICINE | Facility: CLINIC | Age: 72
End: 2022-03-14
Payer: MEDICARE

## 2022-03-14 DIAGNOSIS — E11.9 TYPE 2 DIABETES MELLITUS WITHOUT COMPLICATION, WITHOUT LONG-TERM CURRENT USE OF INSULIN (H): Primary | ICD-10-CM

## 2022-03-14 NOTE — TELEPHONE ENCOUNTER
Reason for Call:  Medication or medication refill:    Do you use a [facility]?  Name of the pharmacy and phone number for the current request:  [facility]    Name of the medication requested: SHOES FOR DIABETES TYPE II    Other request: SEND TO PHARMACY    Can we leave a detailed message on this number? YES    Phone number patient can be reached at: Cell number on file:    Telephone Information:   Mobile [phone]       Best Time: ANY    Call taken on 3/14/2022 at 10:13 AM by KELLEY FIGUEREDO

## 2022-03-18 ENCOUNTER — TELEPHONE (OUTPATIENT)
Dept: FAMILY MEDICINE | Facility: CLINIC | Age: 72
End: 2022-03-18
Payer: MEDICARE

## 2022-03-18 NOTE — TELEPHONE ENCOUNTER
Pharmacy sent fax requesting new Rx for diabetic shoes and moldable orthotics with diagnosis codes.

## 2022-03-21 ENCOUNTER — TELEPHONE (OUTPATIENT)
Dept: FAMILY MEDICINE | Facility: CLINIC | Age: 72
End: 2022-03-21
Payer: MEDICARE

## 2022-03-21 ENCOUNTER — MEDICAL CORRESPONDENCE (OUTPATIENT)
Dept: HEALTH INFORMATION MANAGEMENT | Facility: CLINIC | Age: 72
End: 2022-03-21
Payer: MEDICARE

## 2022-03-21 NOTE — TELEPHONE ENCOUNTER
Reason for Call:  Medication or medication refill:    Do you use a [facility]?  Name of the pharmacy and phone number for the current request:  [facility]    Name of the medication requested: Shoes from [facility]    Other request: Patient states he needs them for neuropathy concerns. Patient states he does not have corns, callesus, or other foot problems history Patient states he needs a size 10.5    Can we leave a detailed message on this number? YES    Phone number patient can be reached at: Home number on file [phone] (home)    Best Time: anytime    Call taken on 3/21/2022 at 12:23 PM by Rosio Hernandez

## 2022-03-21 NOTE — TELEPHONE ENCOUNTER
Patient called back and states he needs them for neuropathy concerns. Patient states he does not have corns, callesus, or other foot problems history Patient states he needs a size 10.5    Form completed and faxed back to [facility].

## 2022-03-21 NOTE — TELEPHONE ENCOUNTER
[facility] received fax, called patient and LVM to call back as there are a few questions that need to be completed.

## 2022-04-18 DIAGNOSIS — L29.0 RECTAL ITCHING: Primary | ICD-10-CM

## 2022-04-18 DIAGNOSIS — N40.0 BENIGN PROSTATIC HYPERPLASIA, UNSPECIFIED WHETHER LOWER URINARY TRACT SYMPTOMS PRESENT: ICD-10-CM

## 2022-04-20 RX ORDER — HYDROCORTISONE ACETATE 25 MG
SUPPOSITORY, RECTAL RECTAL
Qty: 24 SUPPOSITORY | Refills: 11 | Status: SHIPPED | OUTPATIENT
Start: 2022-04-20 | End: 2022-09-02

## 2022-04-20 RX ORDER — DUTASTERIDE 0.5 MG/1
CAPSULE, LIQUID FILLED ORAL
Qty: 90 CAPSULE | Refills: 3 | Status: SHIPPED | OUTPATIENT
Start: 2022-04-20 | End: 2022-05-12

## 2022-04-20 NOTE — TELEPHONE ENCOUNTER
Please review pharmacy request attached to Refill Requests.  Requesting anucort and disregard dutasteride.

## 2022-04-29 ENCOUNTER — TELEPHONE (OUTPATIENT)
Dept: FAMILY MEDICINE | Facility: CLINIC | Age: 72
End: 2022-04-29
Payer: MEDICARE

## 2022-05-03 ENCOUNTER — TELEPHONE (OUTPATIENT)
Dept: FAMILY MEDICINE | Facility: CLINIC | Age: 72
End: 2022-05-03
Payer: MEDICARE

## 2022-05-03 NOTE — TELEPHONE ENCOUNTER
Reason for Call: Request for an order or referral:    Order or referral being requested: Lab work A1C    Date needed: within the next several days    Has the patient been seen by the PCP for this problem? YES    Additional comments: N/A    Phone number Patient can be reached at:  Cell number on file:    Telephone Information:   Mobile [phone]       Best Time:  Any      Can we leave a detailed message on this number?  YES    Call taken on 5/3/2022 at 12:47 PM by KELLEY FIGUEREDO

## 2022-05-04 DIAGNOSIS — E11.9 TYPE 2 DIABETES MELLITUS WITHOUT COMPLICATION, WITHOUT LONG-TERM CURRENT USE OF INSULIN (H): Primary | ICD-10-CM

## 2022-05-09 ENCOUNTER — LAB (OUTPATIENT)
Dept: LAB | Facility: CLINIC | Age: 72
End: 2022-05-09
Payer: MEDICARE

## 2022-05-09 DIAGNOSIS — E11.9 TYPE 2 DIABETES MELLITUS WITHOUT COMPLICATION, WITHOUT LONG-TERM CURRENT USE OF INSULIN (H): ICD-10-CM

## 2022-05-09 LAB — HBA1C MFR BLD: 5.9 % (ref 0–5.6)

## 2022-05-09 PROCEDURE — 36415 COLL VENOUS BLD VENIPUNCTURE: CPT

## 2022-05-09 PROCEDURE — 83036 HEMOGLOBIN GLYCOSYLATED A1C: CPT

## 2022-05-12 ENCOUNTER — OFFICE VISIT (OUTPATIENT)
Dept: FAMILY MEDICINE | Facility: CLINIC | Age: 72
End: 2022-05-12
Payer: MEDICARE

## 2022-05-12 VITALS
WEIGHT: 157.7 LBS | HEART RATE: 76 BPM | BODY MASS INDEX: 24.75 KG/M2 | DIASTOLIC BLOOD PRESSURE: 68 MMHG | OXYGEN SATURATION: 98 % | SYSTOLIC BLOOD PRESSURE: 122 MMHG | HEIGHT: 67 IN

## 2022-05-12 DIAGNOSIS — G89.29 OTHER CHRONIC PAIN: ICD-10-CM

## 2022-05-12 DIAGNOSIS — Z79.899 MEDICATION MANAGEMENT: ICD-10-CM

## 2022-05-12 DIAGNOSIS — N52.9 ERECTILE DYSFUNCTION, UNSPECIFIED ERECTILE DYSFUNCTION TYPE: ICD-10-CM

## 2022-05-12 DIAGNOSIS — L29.0 ANAL PRURITUS: ICD-10-CM

## 2022-05-12 DIAGNOSIS — Z12.5 SCREENING FOR PROSTATE CANCER: ICD-10-CM

## 2022-05-12 DIAGNOSIS — F41.1 GENERALIZED ANXIETY DISORDER: ICD-10-CM

## 2022-05-12 DIAGNOSIS — Z86.2 HISTORY OF ANEMIA: Primary | ICD-10-CM

## 2022-05-12 DIAGNOSIS — E11.9 TYPE 2 DIABETES MELLITUS WITHOUT COMPLICATION, WITHOUT LONG-TERM CURRENT USE OF INSULIN (H): ICD-10-CM

## 2022-05-12 DIAGNOSIS — G47.33 OSA (OBSTRUCTIVE SLEEP APNEA): ICD-10-CM

## 2022-05-12 DIAGNOSIS — R19.7 DIARRHEA, UNSPECIFIED TYPE: ICD-10-CM

## 2022-05-12 DIAGNOSIS — N40.0 BENIGN PROSTATIC HYPERPLASIA, UNSPECIFIED WHETHER LOWER URINARY TRACT SYMPTOMS PRESENT: ICD-10-CM

## 2022-05-12 PROCEDURE — 99214 OFFICE O/P EST MOD 30 MIN: CPT | Performed by: EMERGENCY MEDICINE

## 2022-05-12 RX ORDER — OXYCODONE AND ACETAMINOPHEN 5; 325 MG/1; MG/1
1 TABLET ORAL EVERY 4 HOURS PRN
Qty: 30 TABLET | Refills: 0 | Status: SHIPPED | OUTPATIENT
Start: 2022-05-12 | End: 2023-03-31

## 2022-05-12 RX ORDER — METFORMIN HCL 500 MG
2000 TABLET, EXTENDED RELEASE 24 HR ORAL DAILY
Qty: 370 TABLET | Refills: 3 | Status: SHIPPED | OUTPATIENT
Start: 2022-05-12 | End: 2023-03-01

## 2022-05-12 RX ORDER — VENLAFAXINE HYDROCHLORIDE 75 MG/1
75 CAPSULE, EXTENDED RELEASE ORAL DAILY
Qty: 95 CAPSULE | Refills: 3 | Status: SHIPPED | OUTPATIENT
Start: 2022-05-12 | End: 2023-03-31

## 2022-05-12 RX ORDER — DUTASTERIDE 0.5 MG/1
CAPSULE, LIQUID FILLED ORAL
Qty: 95 CAPSULE | Refills: 3 | Status: SHIPPED | OUTPATIENT
Start: 2022-05-12 | End: 2023-03-31

## 2022-05-12 RX ORDER — TRIAMCINOLONE ACETONIDE 1 MG/G
CREAM TOPICAL 2 TIMES DAILY PRN
Qty: 30 G | Refills: 3 | Status: SHIPPED | OUTPATIENT
Start: 2022-05-12 | End: 2023-03-31

## 2022-05-12 RX ORDER — BROMOCRIPTINE MESYLATE 0.8 MG/1
2.4 TABLET ORAL EVERY MORNING
Qty: 270 TABLET | Refills: 3 | Status: SHIPPED | OUTPATIENT
Start: 2022-05-12 | End: 2023-06-23

## 2022-05-12 RX ORDER — GLIMEPIRIDE 2 MG/1
2 TABLET ORAL
Qty: 95 TABLET | Refills: 3 | Status: SHIPPED | OUTPATIENT
Start: 2022-05-12 | End: 2023-03-31

## 2022-05-12 RX ORDER — SILDENAFIL 100 MG/1
100 TABLET, FILM COATED ORAL DAILY PRN
Qty: 30 TABLET | Refills: 3 | Status: SHIPPED | OUTPATIENT
Start: 2022-05-12 | End: 2023-05-15

## 2022-05-12 RX ORDER — MODAFINIL 200 MG/1
200 TABLET ORAL EVERY MORNING
Qty: 30 TABLET | Refills: 5 | Status: SHIPPED | OUTPATIENT
Start: 2022-05-12 | End: 2022-09-02

## 2022-05-12 RX ORDER — BROMOCRIPTINE MESYLATE 0.8 MG/1
3 TABLET ORAL EVERY MORNING
COMMUNITY
Start: 2021-11-02 | End: 2022-05-12

## 2022-05-12 RX ORDER — DIPHENOXYLATE HCL/ATROPINE 2.5-.025MG
1 TABLET ORAL 4 TIMES DAILY PRN
Qty: 95 TABLET | Refills: 3 | Status: SHIPPED | OUTPATIENT
Start: 2022-05-12 | End: 2023-03-14

## 2022-05-12 NOTE — PROGRESS NOTES
History of Present Illness:  Nadeem Paulino is a 71 year old male    Working out several times a week lifting weights and walking on the treadmill. Working on improving diet as well. Recent HgbA1c 5.9% May 2022 and had been 7.8% 2022.     Had Coronavirus 2020. Fatigue post infection slowly improving.     Started Bromocriptine 2017 for diabetes. No significant side effects. Eating lots of fruits.     Chewing rarely. Using nicorrette. Wants to quit and concerned that chewing could have contributed to the colon polyps. Chews copehagen 2-3 cans a week. Trying nicorrette. Chewing for 30 years. Used an herbal supplement and gum. Using nicorette lozenges.     Had hemorrhoids surgically fixed . Continues to have pain and itching intermittently but less frequently. When has rectal pain 1x a week triamcinolone 0.1% cream, anucort and lidocaine helps.     Had loose stools 3-4x a day and took lomotil 3 tabs a day following hemicolectomy . Now stools have improved and needs lomotil about 2-3x a week (cholestyramine too constipation even 1/2 scoop).     Takes percocet about 5-6 tabs a month for various chronic pains. Prescriptions had [facility] ([facility]jia) but no longer. Has left elbow chronic pain from parachute injury. Has chronic back pain L4-S1 and right knee with torn meniscus.  Uses tylenol daily for lower back pain and neck pain  Diabetes diagnosed in .     Everything hurts when walks a mile. Most pain in left achilles since injury .  Weight is stable  Takes modafinil since about  for sleep apnea and [facility] no [facility]  Takes effexor  Prostate 8.5 in  with normal biopsies  [place] jump with injury in neck and has had chronic issues and chronic pain. Has had cervical injection  Taking Effexor since  for anxiety and depression (knew some people  in the [employer] on )  Taking modafinil since  giving sleepiness from sleep apnea. Uses  it with driving.         Review of Systems:  Respiratory:  No shortness of breath.    Cardiovascular:  No chest pain.     PHQ-9: 2pts (December 2019). 7pts (September 2017). 2pts (February 2016)  Eczema in the winter  CAGE: 0pts and minimal alcohol       Review of Systems:  No chest pain  No shortness of breath      Current Outpatient Medications   Medication Sig Dispense Refill     biotin 300 MCG TABS tablet Take 300 mcg by mouth daily       CYCLOSET 0.8 MG TABS tablet Take 3 tablets (2.4 mg) by mouth every morning 270 tablet 3     diphenoxylate-atropine (LOMOTIL) 2.5-0.025 MG tablet Take 1 tablet by mouth 4 times daily as needed for diarrhea 95 tablet 3     dutasteride (AVODART) 0.5 MG capsule TAKE ONE CAPSULE (0.5 MG) BY MOUTH DAILY 95 capsule 3     glimepiride (AMARYL) 2 MG tablet Take 1 tablet (2 mg) by mouth every morning (before breakfast) 95 tablet 3     ibuprofen (ADVIL/MOTRIN) 600 MG tablet Take 600 mg by mouth as needed       lidocaine (XYLOCAINE) 5 % external ointment Apply topically 3 times daily as needed for moderate pain (anal pruritis) 15 g 3     metFORMIN (GLUCOPHAGE XR) 500 MG 24 hr tablet Take 4 tablets (2,000 mg) by mouth daily 370 tablet 3     modafinil (PROVIGIL) 200 MG tablet Take 1 tablet (200 mg) by mouth every morning 30 tablet 5     oxyCODONE-acetaminophen (PERCOCET) 5-325 MG tablet Take 1 tablet by mouth every 4 hours as needed (back pain, right knee pain, left arm pain) 30 tablet 0     sildenafil (VIAGRA) 100 MG tablet Take 1 tablet (100 mg) by mouth daily as needed 30 tablet 3     triamcinolone (KENALOG) 0.1 % external cream Apply topically 2 times daily as needed for irritation (anal pruritis) 30 g 3     venlafaxine (EFFEXOR XR) 75 MG 24 hr capsule Take 1 capsule (75 mg) by mouth daily 95 capsule 3     ANUCORT-HC 25 MG suppository INSERT ONE SUPPOSITORY RECTALLY TWICE DAILY AS NEEDED (Patient not taking: Reported on 5/12/2022) 24 suppository 11     Family History: No colon cancer or  "polyps on Dad side but mom side unknown, Mom  age 93 from stroke/CHF. Dad  heart attack age 65 and had CABG in 50's. Brother is healthy    Social History: Currently chewing 2-3 cans a week down from 4-5. Single (never ) and no children. Last long term relationship  of Leukemia     /68 (BP Location: Right arm)   Pulse 76   Ht 1.702 m (5' 7\")   Wt 71.5 kg (157 lb 11.2 oz)   SpO2 98%   BMI 24.70 kg/m    General: no acute distress  Musculoskeletal: normal gait  Lungs: Clear  Heart: Regular rate and rhythm    Assessment and Plan:    Diabetes: continue bromocriptine 2.4mg daily, metformin 2000mg daily and glimepiride 2mg daily with HgbA1c 5.9% (May 2022). Recheck in 3 months. Discussed and declines statin given diabetes even with low LDL Taking aspirin.     Anal pruritis: lidocaine has worked well with anucort and Triamcinolone. Seldom needs anymore  Diarrhea: has decreased from 3 a day to 1-2x a week Lomotil  Anemia: has corrected with normal iron stores. Will continue to monitor CBC  Colon cancer screening: had right hemicolectomy for large polyps . Normal colonoscopy . Two colonoscopy prior to . Normal colonoscopy 2017 and May 2022 with consider repeat      Asbestosis exposure: [place] in the . CXR  and  negative and repeat   Immunizations: Pneumovax. Declines Coronavirus vaccine  Eczema in the winter and triamcinolone helps  Chronic pain: given 30 tabs (May 2022) for 5-6 months ([facility] longer gets [facility]). Could consider Butrans if pain escalates in future  BPH: continue dutasteride and continue to monitor PSA 0.70-1.42 since .     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 [facility] based on sleep apnea, multiple orthopedic issues, Type II diabetes, mild depression and other issues.              "

## 2022-05-26 NOTE — NURSING NOTE
Comprehensive Intake Entered On:  2/2/2022 11:28 AM CST    Performed On:  2/2/2022 11:25 AM CST by Margarette Schmid CMA               Summary   Chief Complaint :   Discuss modafiil - no longer working    Pt's weight 155# at home   Margarette Schmid CMA - 2/2/2022 11:50 AM CST     Menstrual Status :   N/A   Weight Measured :   168.5 lb(Converted to: 168 lb 8 oz, 76.430 kg)    Height/Length Estimated :   67 in(Converted to: 5 ft 7 in, 170.18 cm)    Systolic Blood Pressure :   126 mmHg   Diastolic Blood Pressure :   76 mmHg   Mean Arterial Pressure :   93 mmHg   Peripheral Pulse Rate :   80 bpm   Temperature Tympanic :   97.4 DegF(Converted to: 36.3 DegC)  (LOW)    Oxygen Saturation :   98 %   Margarette Schmid CMA - 2/2/2022 11:25 AM CST   Health Status   Allergies Verified? :   Yes   Medication History Verified? :   Yes   Immunizations Current :   No   Medical History Verified? :   Yes   Pre-Visit Planning Status :   Completed   Tobacco Use? :   Current some day smoker   Margarette Schmid CMA - 2/2/2022 11:25 AM CST   Consents   Consent for Immunization Exchange :   Consent Granted   Consent for Immunizations to Providers :   Consent Granted   Margarette Schmid CMA - 2/2/2022 11:25 AM CST   Social History   Social History   (As Of: 2/2/2022 11:28:18 AM CST)   Alcohol:        Current, Beer (12 oz), 1-2 times per month, 2 drinks/episode average.   (Last Updated: 6/5/2014 6:36:23 PM CDT by Elsie Sellers CMA)          Tobacco:  Past      some day chewing   (Last Updated: 4/12/2021 2:32:13 PM CDT by Blanca Chowdhury CMA)          Electronic Cigarette/Vaping:        Electronic Cigarette Use: Never.   (Last Updated: 2/10/2021 3:10:53 PM CST by Alberto Palumbo CMA)          Substance Abuse:  Denies Substance Abuse      Never   (Last Updated: 10/9/2012 9:09:03 AM CDT by Yaa Tillman)          Employment/School:        Retired, Work/School description: Lt. Col., [employer].   (Last Updated: 10/9/2012 9:10:02 AM CDT by Yaa Tillman)           Home/Environment:        Marital status: Single.  Lives with Self.   (Last Updated: 12/21/2021 7:24:48 AM CST by Yaa Tillman)          Exercise:        Exercise frequency: 1-2 times/week.  Exercise type: Walking, Weight lifting.   (Last Updated: 10/9/2012 9:08:01 AM CDT by Yaa Tillman)          Sexual:        Sexually active: No.  Sexual orientation: Heterosexual.   (Last Updated: 10/9/2012 9:06:24 AM CDT by Yaa Tillman)   Identifies as male   (Last Updated: 12/21/2021 7:24:22 AM CST by Yaa Tillman)   unknown

## 2022-07-30 DIAGNOSIS — H60.312 ACUTE DIFFUSE OTITIS EXTERNA OF LEFT EAR: Primary | ICD-10-CM

## 2022-08-01 RX ORDER — NEOMYCIN SULFATE, POLYMYXIN B SULFATE AND HYDROCORTISONE 10; 3.5; 1 MG/ML; MG/ML; [USP'U]/ML
SUSPENSION/ DROPS AURICULAR (OTIC)
Qty: 10 ML | Refills: 0 | Status: SHIPPED | OUTPATIENT
Start: 2022-08-01 | End: 2022-10-17

## 2022-08-01 NOTE — TELEPHONE ENCOUNTER
Routing refill request to provider for review/approval because:  Drug not on the [facility] refill protocol     Last office visit: 5/12/2022    Future Office Visit:

## 2022-08-23 ENCOUNTER — TELEPHONE (OUTPATIENT)
Dept: FAMILY MEDICINE | Facility: CLINIC | Age: 72
End: 2022-08-23

## 2022-09-02 ENCOUNTER — OFFICE VISIT (OUTPATIENT)
Dept: FAMILY MEDICINE | Facility: CLINIC | Age: 72
End: 2022-09-02
Payer: MEDICARE

## 2022-09-02 VITALS
BODY MASS INDEX: 24.59 KG/M2 | SYSTOLIC BLOOD PRESSURE: 116 MMHG | HEART RATE: 90 BPM | DIASTOLIC BLOOD PRESSURE: 72 MMHG | WEIGHT: 157 LBS

## 2022-09-02 DIAGNOSIS — N52.9 ERECTILE DYSFUNCTION, UNSPECIFIED ERECTILE DYSFUNCTION TYPE: ICD-10-CM

## 2022-09-02 DIAGNOSIS — F41.1 GENERALIZED ANXIETY DISORDER: ICD-10-CM

## 2022-09-02 DIAGNOSIS — N40.0 BENIGN PROSTATIC HYPERPLASIA, UNSPECIFIED WHETHER LOWER URINARY TRACT SYMPTOMS PRESENT: ICD-10-CM

## 2022-09-02 DIAGNOSIS — G47.33 OSA (OBSTRUCTIVE SLEEP APNEA): Primary | ICD-10-CM

## 2022-09-02 DIAGNOSIS — I25.10 CORONARY ARTERY CALCIFICATION SEEN ON CT SCAN: ICD-10-CM

## 2022-09-02 DIAGNOSIS — R53.83 FATIGUE, UNSPECIFIED TYPE: ICD-10-CM

## 2022-09-02 PROBLEM — D12.6 BENIGN NEOPLASM OF COLON: Status: ACTIVE | Noted: 2022-09-02

## 2022-09-02 PROBLEM — K64.8 INTERNAL HEMORRHOIDS: Status: ACTIVE | Noted: 2022-09-02

## 2022-09-02 PROBLEM — M54.2 NECK PAIN: Status: ACTIVE | Noted: 2022-09-02

## 2022-09-02 PROBLEM — E78.5 HYPERLIPIDEMIA: Status: ACTIVE | Noted: 2022-09-02

## 2022-09-02 PROBLEM — S04.819A: Status: ACTIVE | Noted: 2022-09-02

## 2022-09-02 PROBLEM — R06.89 DIFFICULTY BREATHING: Status: ACTIVE | Noted: 2022-09-02

## 2022-09-02 PROBLEM — F48.9 DEFERRED DIAGNOSIS ON AXIS II: Status: ACTIVE | Noted: 2022-09-02

## 2022-09-02 PROBLEM — M25.50 ARTHRALGIA OF MULTIPLE JOINTS: Status: ACTIVE | Noted: 2022-09-02

## 2022-09-02 PROBLEM — E11.9 TYPE 2 DIABETES MELLITUS WITHOUT COMPLICATIONS (H): Status: ACTIVE | Noted: 2022-09-02

## 2022-09-02 PROBLEM — G98.8 DISORDER OF NERVOUS SYSTEM DUE TO TYPE 2 DIABETES MELLITUS (H): Status: ACTIVE | Noted: 2022-09-02

## 2022-09-02 PROBLEM — R00.2 PALPITATIONS: Status: ACTIVE | Noted: 2022-09-02

## 2022-09-02 PROBLEM — Z86.2 HISTORY OF ANEMIA: Status: ACTIVE | Noted: 2022-09-02

## 2022-09-02 PROBLEM — F99 MENTAL DISORDER: Status: ACTIVE | Noted: 2022-09-02

## 2022-09-02 PROBLEM — L30.1 DYSHIDROSIS: Status: ACTIVE | Noted: 2022-09-02

## 2022-09-02 PROBLEM — Q66.50 CONGENITAL PES PLANUS: Status: ACTIVE | Noted: 2022-09-02

## 2022-09-02 PROBLEM — G62.9 NEUROPATHY: Status: ACTIVE | Noted: 2022-09-02

## 2022-09-02 PROBLEM — H52.209 ASTIGMATISM: Status: ACTIVE | Noted: 2022-09-02

## 2022-09-02 PROBLEM — M77.00 GOLFER'S ELBOW: Status: ACTIVE | Noted: 2022-09-02

## 2022-09-02 PROBLEM — F43.29 ADJUSTMENT DISORDER WITH MIXED EMOTIONAL FEATURES: Status: ACTIVE | Noted: 2022-09-02

## 2022-09-02 PROBLEM — L93.0 LUPUS ERYTHEMATOSUS: Status: ACTIVE | Noted: 2022-09-02

## 2022-09-02 PROBLEM — M19.90 ARTHRITIS: Status: ACTIVE | Noted: 2022-09-02

## 2022-09-02 PROBLEM — H52.4 PRESBYOPIA: Status: ACTIVE | Noted: 2022-09-02

## 2022-09-02 PROBLEM — Z90.49 HISTORY OF COLECTOMY: Status: ACTIVE | Noted: 2022-09-02

## 2022-09-02 PROBLEM — Z86.0100 HISTORY OF COLONIC POLYPS: Status: ACTIVE | Noted: 2022-09-02

## 2022-09-02 PROBLEM — J30.2 SEASONAL ALLERGIES: Status: ACTIVE | Noted: 2022-09-02

## 2022-09-02 PROBLEM — L04.0 ACUTE CERVICAL ADENITIS: Status: ACTIVE | Noted: 2022-09-02

## 2022-09-02 PROBLEM — G89.29 CHRONIC LOW BACK PAIN: Status: ACTIVE | Noted: 2022-09-02

## 2022-09-02 PROBLEM — M54.50 CHRONIC LOW BACK PAIN: Status: ACTIVE | Noted: 2022-09-02

## 2022-09-02 PROBLEM — M47.812 ARTHROPATHY OF CERVICAL FACET JOINT: Status: ACTIVE | Noted: 2022-09-02

## 2022-09-02 PROBLEM — M81.0 AGE RELATED OSTEOPOROSIS: Status: ACTIVE | Noted: 2022-09-02

## 2022-09-02 PROBLEM — F34.1 PRIMARY DYSTHYMIA: Status: ACTIVE | Noted: 2022-09-02

## 2022-09-02 PROBLEM — F48.9 DEFERRED DIAGNOSIS ON AXIS III: Status: ACTIVE | Noted: 2022-09-02

## 2022-09-02 PROBLEM — M51.369 DDD (DEGENERATIVE DISC DISEASE), LUMBAR: Status: ACTIVE | Noted: 2021-08-03

## 2022-09-02 PROBLEM — M85.80 OSTEOPENIA: Status: ACTIVE | Noted: 2022-09-02

## 2022-09-02 PROBLEM — E61.1 IRON DEFICIENCY: Status: ACTIVE | Noted: 2022-09-02

## 2022-09-02 PROBLEM — K64.8 INTERNAL HEMORRHOIDS WITHOUT COMPLICATION: Status: ACTIVE | Noted: 2022-09-02

## 2022-09-02 PROBLEM — T78.40XA ALLERGIES: Status: ACTIVE | Noted: 2022-09-02

## 2022-09-02 PROBLEM — K63.5 POLYP OF LARGE INTESTINE: Status: ACTIVE | Noted: 2022-09-02

## 2022-09-02 PROBLEM — G40.909 SEIZURE DISORDER (H): Status: ACTIVE | Noted: 2022-09-02

## 2022-09-02 PROBLEM — E11.69 DISORDER OF NERVOUS SYSTEM DUE TO TYPE 2 DIABETES MELLITUS (H): Status: ACTIVE | Noted: 2022-09-02

## 2022-09-02 PROBLEM — N40.1 BENIGN PROSTATIC HYPERPLASIA WITH LOWER URINARY TRACT SYMPTOMS: Status: ACTIVE | Noted: 2018-11-07

## 2022-09-02 PROBLEM — K59.4 PROCTALGIA FUGAX: Status: ACTIVE | Noted: 2022-09-02

## 2022-09-02 LAB
BASOPHILS # BLD AUTO: 0 10E3/UL (ref 0–0.2)
BASOPHILS NFR BLD AUTO: 1 %
EOSINOPHIL # BLD AUTO: 0.1 10E3/UL (ref 0–0.7)
EOSINOPHIL NFR BLD AUTO: 3 %
ERYTHROCYTE [DISTWIDTH] IN BLOOD BY AUTOMATED COUNT: 12.9 % (ref 10–15)
HCT VFR BLD AUTO: 41.6 % (ref 40–53)
HGB BLD-MCNC: 13.8 G/DL (ref 13.3–17.7)
IMM GRANULOCYTES # BLD: 0 10E3/UL
IMM GRANULOCYTES NFR BLD: 0 %
LYMPHOCYTES # BLD AUTO: 1.2 10E3/UL (ref 0.8–5.3)
LYMPHOCYTES NFR BLD AUTO: 24 %
MCH RBC QN AUTO: 31.2 PG (ref 26.5–33)
MCHC RBC AUTO-ENTMCNC: 33.2 G/DL (ref 31.5–36.5)
MCV RBC AUTO: 94 FL (ref 78–100)
MONOCYTES # BLD AUTO: 0.4 10E3/UL (ref 0–1.3)
MONOCYTES NFR BLD AUTO: 9 %
NEUTROPHILS # BLD AUTO: 3.1 10E3/UL (ref 1.6–8.3)
NEUTROPHILS NFR BLD AUTO: 63 %
PLATELET # BLD AUTO: 273 10E3/UL (ref 150–450)
RBC # BLD AUTO: 4.42 10E6/UL (ref 4.4–5.9)
TSH SERPL DL<=0.005 MIU/L-ACNC: 1.25 UIU/ML (ref 0.3–4.2)
WBC # BLD AUTO: 4.9 10E3/UL (ref 4–11)

## 2022-09-02 PROCEDURE — 36415 COLL VENOUS BLD VENIPUNCTURE: CPT | Performed by: PHYSICIAN ASSISTANT

## 2022-09-02 PROCEDURE — 86618 LYME DISEASE ANTIBODY: CPT | Performed by: PHYSICIAN ASSISTANT

## 2022-09-02 PROCEDURE — 99214 OFFICE O/P EST MOD 30 MIN: CPT | Performed by: PHYSICIAN ASSISTANT

## 2022-09-02 PROCEDURE — 80050 GENERAL HEALTH PANEL: CPT | Performed by: PHYSICIAN ASSISTANT

## 2022-09-02 RX ORDER — MODAFINIL 200 MG/1
200 TABLET ORAL EVERY MORNING
Qty: 30 TABLET | Refills: 5 | Status: SHIPPED | OUTPATIENT
Start: 2022-09-02 | End: 2023-05-03

## 2022-09-02 ASSESSMENT — ENCOUNTER SYMPTOMS
ACTIVITY CHANGE: 0
MUSCULOSKELETAL NEGATIVE: 1
FATIGUE: 1
ENDOCRINE NEGATIVE: 1
CARDIOVASCULAR NEGATIVE: 1
GASTROINTESTINAL NEGATIVE: 1
HEADACHES: 0

## 2022-09-02 NOTE — LETTER
September 5, 2022      Nadeem Paulino  [address] [facility] [address]        Dear ,    We are writing to inform you of your test results.    Your glucose is borderline. I will contact you with your other results as available. The rest of these labs are fine.    Resulted Orders   TSH with free T4 reflex   Result Value Ref Range    TSH 1.25 0.30 - 4.20 uIU/mL   Comprehensive metabolic panel   Result Value Ref Range    Sodium 139 136 - 145 mmol/L    Potassium 5.1 3.4 - 5.3 mmol/L    Creatinine 0.75 0.67 - 1.17 mg/dL    Urea Nitrogen 15.4 8.0 - 23.0 mg/dL    Chloride 99 98 - 107 mmol/L    Carbon Dioxide (CO2) 25 22 - 29 mmol/L    Anion Gap 15 7 - 15 mmol/L    Glucose 156 (H) 70 - 99 mg/dL    Calcium 9.6 8.8 - 10.2 mg/dL    Protein Total 7.0 6.4 - 8.3 g/dL    Albumin 4.7 3.5 - 5.2 g/dL    Bilirubin Total 0.4 <=1.2 mg/dL    Alkaline Phosphatase 57 40 - 129 U/L    AST 27 10 - 50 U/L    ALT 22 10 - 50 U/L    GFR Estimate >90 >60 mL/min/1.73m2      Comment:      Effective December 21, 2021 eGFRcr in adults is calculated using the 2021 CKD-EPI creatinine equation which includes age and gender (Deedee et al., [employer], DOI: 10.1056/TGZQvy0864593)   CBC with platelets and differential   Result Value Ref Range    WBC Count 4.9 4.0 - 11.0 10e3/uL    RBC Count 4.42 4.40 - 5.90 10e6/uL    Hemoglobin 13.8 13.3 - 17.7 g/dL    Hematocrit 41.6 40.0 - 53.0 %    MCV 94 78 - 100 fL    MCH 31.2 26.5 - 33.0 pg    MCHC 33.2 31.5 - 36.5 g/dL    RDW 12.9 10.0 - 15.0 %    Platelet Count 273 150 - 450 10e3/uL    % Neutrophils 63 %    % Lymphocytes 24 %    % Monocytes 9 %    % Eosinophils 3 %    % Basophils 1 %    % Immature Granulocytes 0 %    Absolute Neutrophils 3.1 1.6 - 8.3 10e3/uL    Absolute Lymphocytes 1.2 0.8 - 5.3 10e3/uL    Absolute Monocytes 0.4 0.0 - 1.3 10e3/uL    Absolute Eosinophils 0.1 0.0 - 0.7 10e3/uL    Absolute Basophils 0.0 0.0 - 0.2 10e3/uL    Absolute Immature Granulocytes 0.0 <=0.4 10e3/uL       If you have  any questions or concerns, please call the clinic at the number listed above.       Sincerely,      RILEY Villareal

## 2022-09-02 NOTE — PROGRESS NOTES
Assessment & Plan     TIMOTHY (obstructive sleep apnea)  Do not feel his symptoms are related to his TIMOTHY  - TSH with free T4 reflex  - Comprehensive metabolic panel  - CBC with Platelets & Differential  - CT Coronary Calcium Scan  - modafinil (PROVIGIL) 200 MG tablet  Dispense: 30 tablet; Refill: 5    Erectile dysfunction, unspecified erectile dysfunction type  Will refer to urology  - TSH with free T4 reflex  - Comprehensive metabolic panel  - CBC with Platelets & Differential  - Adult Urology  Referral    Benign prostatic hyperplasia, unspecified whether lower urinary tract symptoms present  Refer to  - Adult Urology  Referral    Generalized anxiety disorder  Controlled  - TSH with free T4 reflex  - Lyme Disease Total Abs Bld with Reflex to Confirm CLIA  - Comprehensive metabolic panel  - CBC with Platelets & Differential    Fatigue, unspecified type  Work-up in process  - TSH with free T4 reflex  - Lyme Disease Total Abs Bld with Reflex to Confirm CLIA  - Comprehensive metabolic panel  - CBC with Platelets & Differential                   No follow-ups on file.    RILEY Reyes  [facility]    Viji Silver is a 71 year old, presenting for the following health issues:  Fatigue and Sleep Apnea      71-year-old male presents to the clinic with multiple complaint  He has history of obstructive sleep apnea does well with his device  He will be traveling in the near future needs a refill of his modafinil  He complains of excessive fatigue  Some exertional component has been noted  No chest pain no shortness of breath  He is trying to cut back on smokeless tobacco  He is a diabetic  He wonders if there is something wrong with his heart  He has had no rash  He has had no unintentional weight loss  No fever chills or night sweats  He has history of ED history of elevated PSA he would like to see urology    History of Present Illness       Reason for visit:  Sleep  apnea    He eats 2-3 servings of fruits and vegetables daily.He consumes 0 sweetened beverage(s) daily.He exercises with enough effort to increase his heart rate 60 or more minutes per day.  He exercises with enough effort to increase his heart rate 5 days per week.   He is taking medications regularly.       Onset: Since he had Covid  Description:  Ongoing fatigue and general sleep apnea.  Uses the sleep apnea mouthpiece instead of CPAP.  Things have been significantly worse since getting covid.  Also has questions regarding impotence today.          Review of Systems   Constitutional: Positive for fatigue. Negative for activity change.   HENT: Negative.    Cardiovascular: Negative.    Gastrointestinal: Negative.    Endocrine: Negative.    Musculoskeletal: Negative.    Neurological: Negative for headaches.            Objective    There were no vitals taken for this visit.  There is no height or weight on file to calculate BMI.  Physical Exam  Vitals and nursing note reviewed.   Constitutional:       Appearance: Normal appearance.   HENT:      Head: Normocephalic and atraumatic.      Mouth/Throat:      Mouth: Mucous membranes are moist.      Pharynx: Oropharynx is clear. No posterior oropharyngeal erythema.   Eyes:      Conjunctiva/sclera: Conjunctivae normal.   Cardiovascular:      Rate and Rhythm: Normal rate and regular rhythm.      Heart sounds: Normal heart sounds.   Pulmonary:      Effort: Pulmonary effort is normal.   Musculoskeletal:      Cervical back: Normal range of motion and neck supple.   Lymphadenopathy:      Cervical: No cervical adenopathy.   Neurological:      Mental Status: He is alert.                            @JER@  [unfilled]

## 2022-09-02 NOTE — LETTER
September 6, 2022      Nadeem Paulino  [address] [address]        Dear ,    We are writing to inform you of your test results.    Your glucose is mildly elevated and we should get a fasting glucose and a hemoglobin A1c in 3 months. The rest of your labs are fine. Call if you have any questions.    Resulted Orders   TSH with free T4 reflex   Result Value Ref Range    TSH 1.25 0.30 - 4.20 uIU/mL   Lyme Disease Total Abs Bld with Reflex to Confirm CLIA   Result Value Ref Range    Lyme Disease Antibodies Total 0.12 <0.90      Comment:      Non-reactive, Absence of detectable Borrelia burgdorferi antibodies. A non-reactive result does not exclude the possibility of Borrelia burgdorferi infection. If early Lyme disease is suspected, a second sample should be collected and tested 2 to 4 weeks later.   Comprehensive metabolic panel   Result Value Ref Range    Sodium 139 136 - 145 mmol/L    Potassium 5.1 3.4 - 5.3 mmol/L    Creatinine 0.75 0.67 - 1.17 mg/dL    Urea Nitrogen 15.4 8.0 - 23.0 mg/dL    Chloride 99 98 - 107 mmol/L    Carbon Dioxide (CO2) 25 22 - 29 mmol/L    Anion Gap 15 7 - 15 mmol/L    Glucose 156 (H) 70 - 99 mg/dL    Calcium 9.6 8.8 - 10.2 mg/dL    Protein Total 7.0 6.4 - 8.3 g/dL    Albumin 4.7 3.5 - 5.2 g/dL    Bilirubin Total 0.4 <=1.2 mg/dL    Alkaline Phosphatase 57 40 - 129 U/L    AST 27 10 - 50 U/L    ALT 22 10 - 50 U/L    GFR Estimate >90 >60 mL/min/1.73m2      Comment:      Effective December 21, 2021 eGFRcr in adults is calculated using the 2021 CKD-EPI creatinine equation which includes age and gender (Deedee et al., NEJM, DOI: 10.1056/EVXXdq2059935)   CBC with platelets and differential   Result Value Ref Range    WBC Count 4.9 4.0 - 11.0 10e3/uL    RBC Count 4.42 4.40 - 5.90 10e6/uL    Hemoglobin 13.8 13.3 - 17.7 g/dL    Hematocrit 41.6 40.0 - 53.0 %    MCV 94 78 - 100 fL    MCH 31.2 26.5 - 33.0 pg    MCHC 33.2 31.5 - 36.5 g/dL    RDW 12.9 10.0 - 15.0 %    Platelet  Count 273 150 - 450 10e3/uL    % Neutrophils 63 %    % Lymphocytes 24 %    % Monocytes 9 %    % Eosinophils 3 %    % Basophils 1 %    % Immature Granulocytes 0 %    Absolute Neutrophils 3.1 1.6 - 8.3 10e3/uL    Absolute Lymphocytes 1.2 0.8 - 5.3 10e3/uL    Absolute Monocytes 0.4 0.0 - 1.3 10e3/uL    Absolute Eosinophils 0.1 0.0 - 0.7 10e3/uL    Absolute Basophils 0.0 0.0 - 0.2 10e3/uL    Absolute Immature Granulocytes 0.0 <=0.4 10e3/uL       If you have any questions or concerns, please call the clinic at the number listed above.       Sincerely,      RILEY Villareal

## 2022-09-03 LAB
ALBUMIN SERPL BCG-MCNC: 4.7 G/DL (ref 3.5–5.2)
ALP SERPL-CCNC: 57 U/L (ref 40–129)
ALT SERPL W P-5'-P-CCNC: 22 U/L (ref 10–50)
ANION GAP SERPL CALCULATED.3IONS-SCNC: 15 MMOL/L (ref 7–15)
AST SERPL W P-5'-P-CCNC: 27 U/L (ref 10–50)
BILIRUB SERPL-MCNC: 0.4 MG/DL
BUN SERPL-MCNC: 15.4 MG/DL (ref 8–23)
CALCIUM SERPL-MCNC: 9.6 MG/DL (ref 8.8–10.2)
CHLORIDE SERPL-SCNC: 99 MMOL/L (ref 98–107)
CREAT SERPL-MCNC: 0.75 MG/DL (ref 0.67–1.17)
DEPRECATED HCO3 PLAS-SCNC: 25 MMOL/L (ref 22–29)
GFR SERPL CREATININE-BSD FRML MDRD: >90 ML/MIN/1.73M2
GLUCOSE SERPL-MCNC: 156 MG/DL (ref 70–99)
POTASSIUM SERPL-SCNC: 5.1 MMOL/L (ref 3.4–5.3)
PROT SERPL-MCNC: 7 G/DL (ref 6.4–8.3)
SODIUM SERPL-SCNC: 139 MMOL/L (ref 136–145)

## 2022-09-06 ENCOUNTER — TELEPHONE (OUTPATIENT)
Dept: FAMILY MEDICINE | Facility: CLINIC | Age: 72
End: 2022-09-06

## 2022-09-06 LAB — B BURGDOR IGG+IGM SER QL: 0.12

## 2022-10-14 DIAGNOSIS — H60.312 ACUTE DIFFUSE OTITIS EXTERNA OF LEFT EAR: ICD-10-CM

## 2022-10-17 RX ORDER — NEOMYCIN SULFATE, POLYMYXIN B SULFATE AND HYDROCORTISONE 10; 3.5; 1 MG/ML; MG/ML; [USP'U]/ML
SUSPENSION/ DROPS AURICULAR (OTIC)
Qty: 10 ML | Refills: 0 | Status: SHIPPED | OUTPATIENT
Start: 2022-10-17 | End: 2023-01-18

## 2022-10-17 NOTE — TELEPHONE ENCOUNTER
Routing refill request to provider for review/approval because:  Drug not on the [facility] refill protocol     Last Written Prescription Date: 8/1/22  Last Fill Quantity: 10ml,  # refills: 0   Last office visit: 9/2/2022 with prescribing provider

## 2022-10-26 ENCOUNTER — HOSPITAL ENCOUNTER (OUTPATIENT)
Dept: CT IMAGING | Facility: CLINIC | Age: 72
Discharge: HOME | End: 2022-10-26
Attending: PHYSICIAN ASSISTANT

## 2022-10-26 DIAGNOSIS — G47.33 OSA (OBSTRUCTIVE SLEEP APNEA): ICD-10-CM

## 2022-10-26 PROCEDURE — 75571 CT HRT W/O DYE W/CA TEST: CPT | Mod: 26 | Performed by: GENERAL ACUTE CARE HOSPITAL

## 2022-10-26 PROCEDURE — 75571 CT HRT W/O DYE W/CA TEST: CPT

## 2022-10-27 LAB
CV CALCIUM SCORE AGATSTON LM: 0
CV CALCIUM SCORING AGATSON LAD: 396
CV CALCIUM SCORING AGATSTON CX: 33
CV CALCIUM SCORING AGATSTON RCA: 79
CV CALCIUM SCORING AGATSTON TOTAL: 508

## 2022-11-16 ENCOUNTER — OFFICE VISIT (OUTPATIENT)
Dept: FAMILY MEDICINE | Facility: CLINIC | Age: 72
End: 2022-11-16
Payer: MEDICARE

## 2022-11-16 VITALS
RESPIRATION RATE: 20 BRPM | SYSTOLIC BLOOD PRESSURE: 114 MMHG | BODY MASS INDEX: 26.37 KG/M2 | HEART RATE: 75 BPM | DIASTOLIC BLOOD PRESSURE: 70 MMHG | OXYGEN SATURATION: 100 % | HEIGHT: 67 IN | WEIGHT: 168 LBS | TEMPERATURE: 97.5 F

## 2022-11-16 DIAGNOSIS — E11.69 DISORDER OF NERVOUS SYSTEM DUE TO TYPE 2 DIABETES MELLITUS (H): ICD-10-CM

## 2022-11-16 DIAGNOSIS — L57.0 ACTINIC KERATOSIS: Primary | ICD-10-CM

## 2022-11-16 DIAGNOSIS — G98.8 DISORDER OF NERVOUS SYSTEM DUE TO TYPE 2 DIABETES MELLITUS (H): ICD-10-CM

## 2022-11-16 PROCEDURE — 17000 DESTRUCT PREMALG LESION: CPT | Performed by: PHYSICIAN ASSISTANT

## 2022-11-16 PROCEDURE — 17003 DESTRUCT PREMALG LES 2-14: CPT | Performed by: PHYSICIAN ASSISTANT

## 2022-11-16 PROCEDURE — 99213 OFFICE O/P EST LOW 20 MIN: CPT | Mod: 25 | Performed by: PHYSICIAN ASSISTANT

## 2022-11-16 RX ORDER — BROMOCRIPTINE MESYLATE 2.5 MG/1
1.25 TABLET ORAL
COMMUNITY
Start: 2022-10-27 | End: 2023-05-10

## 2022-11-16 ASSESSMENT — ENCOUNTER SYMPTOMS
CARDIOVASCULAR NEGATIVE: 1
RESPIRATORY NEGATIVE: 1
COLOR CHANGE: 1

## 2022-11-16 ASSESSMENT — PATIENT HEALTH QUESTIONNAIRE - PHQ9
10. IF YOU CHECKED OFF ANY PROBLEMS, HOW DIFFICULT HAVE THESE PROBLEMS MADE IT FOR YOU TO DO YOUR WORK, TAKE CARE OF THINGS AT HOME, OR GET ALONG WITH OTHER PEOPLE: NOT DIFFICULT AT ALL
SUM OF ALL RESPONSES TO PHQ QUESTIONS 1-9: 1
SUM OF ALL RESPONSES TO PHQ QUESTIONS 1-9: 1

## 2022-11-16 NOTE — PROGRESS NOTES
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    Weight as of this encounter: 76.2 kg (168 lb).           No follow-ups on file.    RILEY Reyes  [employer]    Viji Silver is a 72 year old, presenting for the following health issues:  Follow Up (Lab Results) and Derm Problem      72-year-old male presents to clinic to discuss recent CT calcium scoring he also wants me to know that he was out of state and a muscle load shooting contest and developed a pneumonia he was given doxycycline he is feeling better but like to have his lung lungs checked  He has 2 lesions on his face he would like to have me look at he has a history of actinic keratoses that have been treated previously  He has no chest pain or shortness of breath he has an appointment with a cardiologist on the 30th of this month    History of Present Illness       Reason for visit:  Skin blot on l cheek  Symptom onset:  3-4 weeks ago    He eats 2-3 servings of fruits and vegetables daily.He consumes 0 sweetened beverage(s) daily.He exercises with enough effort to increase his heart rate 10 to 19 minutes per day.  He exercises with enough effort to increase his heart rate 3 or less days per week.   He is taking medications regularly.    Today's PHQ-9         PHQ-9 Total Score: 1    PHQ-9 Q9 Thoughts of better off dead/self-harm past 2 weeks :   Not at all    How difficult have these problems made it for you to do your work, take care of things at home, or get along with other people: Not difficult at all         Review of Systems   Respiratory: Negative.    Cardiovascular: Negative.    Skin: Positive for color change.         " "   Objective    /70   Pulse 75   Temp 97.5  F (36.4  C)   Resp 20   Ht 1.702 m (5' 7\")   Wt 76.2 kg (168 lb)   SpO2 100%   BMI 26.31 kg/m    Body mass index is 26.31 kg/m .  Physical Exam 3 mm spots left cheek consistent with actinic keratoses erythematous with scale verbal informed consent carried out lesions were treated with liquid nitrogen in a freeze thaw freeze cycle he tolerated it well there were no complications he will follow on an as-needed basis if this is not replaced with normal skin and 3 to 4 weeks  Lungs clear well ventilated  Cardiovascular regular rate and rhythm  He is in no acute distress                    "

## 2022-11-30 ENCOUNTER — OFFICE VISIT (OUTPATIENT)
Dept: CARDIOLOGY | Facility: CLINIC | Age: 72
End: 2022-11-30
Attending: PHYSICIAN ASSISTANT
Payer: MEDICARE

## 2022-11-30 VITALS
SYSTOLIC BLOOD PRESSURE: 117 MMHG | DIASTOLIC BLOOD PRESSURE: 72 MMHG | BODY MASS INDEX: 27.28 KG/M2 | HEART RATE: 77 BPM | WEIGHT: 174.2 LBS

## 2022-11-30 DIAGNOSIS — R53.83 FATIGUE, UNSPECIFIED TYPE: ICD-10-CM

## 2022-11-30 DIAGNOSIS — F17.200 NICOTINE DEPENDENCE, UNCOMPLICATED, UNSPECIFIED NICOTINE PRODUCT TYPE: ICD-10-CM

## 2022-11-30 DIAGNOSIS — I25.10 CORONARY ARTERY CALCIFICATION SEEN ON CT SCAN: Primary | ICD-10-CM

## 2022-11-30 PROCEDURE — 99204 OFFICE O/P NEW MOD 45 MIN: CPT | Performed by: INTERNAL MEDICINE

## 2022-11-30 RX ORDER — ROSUVASTATIN CALCIUM 5 MG/1
5 TABLET, COATED ORAL DAILY
Qty: 30 TABLET | Refills: 11 | Status: SHIPPED | OUTPATIENT
Start: 2022-11-30 | End: 2023-03-31

## 2022-11-30 NOTE — PATIENT INSTRUCTIONS
It was a pleasure to meet with you today.      Below is a summary of your visit.   Start taking rosuvastatin 5 mg daily to reduce your risk of heart attack  Schedule a stress echocardiogram to evaluate for obstructive coronary disease  Follow up with me in 6 months.    We will call you to inform you of your test or procedure results within 3 business days of the test being performed.  If you do not hear from our office with the test results within 1 week please do not hesitate to call asking for these results.     Please do not hesitate to call the [facility] with any questions or concerns at [phone].     Sincerely,

## 2022-11-30 NOTE — PROGRESS NOTES
[facility]   [address], [place] [address]   [email or web address]   OFFICE: [phone]     CARDIOLOGY CLINIC NOTE     Thank you, Meek Mcgill, for asking the [facility] team to see Mr. Nadeem Paulino to evaluate New Patient (High score on recent testing...referred by Meek Serna)        Assessment/Recommendations   Assessment:    1. Coronary artery disease - denies anginal symptoms. Has high risk cardiac calcium score, predominantly in the LAD coronary artery. LDL is already <70 but Nadeem is not on statin therapy.  2. DMII - controlled with metformin.   3. Nicotine use - uses snuff. Recommended nicotine cessation and abstinence.    Plan:  1. Exercise stress echo to assess for ischemia given high risk calcium score.  2. Start rosuvastatin 5 mg daily.          History of Present Illness   Mr. Nadeem Paulino is a 72 year old male with a significant past history of DMII who presents for evaluation of an abnormal cardiac calcium score.    [employer] Col. Paulino recently had a CT cardiac calcium score to evaluate for coronary artery disease.  This resulted in a calcium score of 508 which is in the 72nd percentile, with most of the calcium located in the LAD coronary artery.  He was very active up until approximately 3 years ago and has not been getting regular exercise since the beginning of the COVID pandemic.  With activity he does perform he denies exertional dyspnea or chest discomfort.  He has a long 20-year history of diabetes which has historically been quite well controlled.  He has no history of hyperlipidemia.  He has remote history of smoking pipe tobacco and has used snuff since joining the [employer] in the 1970s.    Other than noted above, Mr. Paulino denies any chest pain/pressure/tightness, shortness of breath at rest or with exertion, light headedness/dizziness, pre-syncope, syncope, lower extremity swelling, palpitations,  paroxysmal nocturnal dyspnea (PND), or orthopnea.     Cardiac Problems and Cardiac Diagnostics     Most Recent Cardiac testing:  ECG dated 6/4/14 (personaly reviewed and interpreted): sinus rhythm with left axis deviation    CT cardiac calcium score 10/26/22    The total Agatston score is 508. A calcium score in this range places the individual in the 72nd percentile when compared to an age and gender matched control group.  LM - 0  LAD - 396  Cx - 33  RCA - 79       Medications  Allergies   Current Outpatient Medications   Medication Sig Dispense Refill     biotin 300 MCG TABS tablet Take 300 mcg by mouth daily       bromocriptine (PARLODEL) 2.5 MG tablet 1.25 mg       CYCLOSET 0.8 MG TABS tablet Take 3 tablets (2.4 mg) by mouth every morning 270 tablet 3     diphenoxylate-atropine (LOMOTIL) 2.5-0.025 MG tablet Take 1 tablet by mouth 4 times daily as needed for diarrhea 95 tablet 3     dutasteride (AVODART) 0.5 MG capsule TAKE ONE CAPSULE (0.5 MG) BY MOUTH DAILY 95 capsule 3     glimepiride (AMARYL) 2 MG tablet Take 1 tablet (2 mg) by mouth every morning (before breakfast) 95 tablet 3     ibuprofen (ADVIL/MOTRIN) 600 MG tablet Take 600 mg by mouth as needed       lidocaine (XYLOCAINE) 5 % external ointment Apply topically 3 times daily as needed for moderate pain (anal pruritis) 15 g 3     metFORMIN (GLUCOPHAGE XR) 500 MG 24 hr tablet Take 4 tablets (2,000 mg) by mouth daily 370 tablet 3     modafinil (PROVIGIL) 200 MG tablet Take 1 tablet (200 mg) by mouth every morning 30 tablet 5     oxyCODONE-acetaminophen (PERCOCET) 5-325 MG tablet Take 1 tablet by mouth every 4 hours as needed (back pain, right knee pain, left arm pain) 30 tablet 0     rosuvastatin (CRESTOR) 5 MG tablet Take 1 tablet (5 mg) by mouth daily 30 tablet 11     sildenafil (VIAGRA) 100 MG tablet Take 1 tablet (100 mg) by mouth daily as needed 30 tablet 3     triamcinolone (KENALOG) 0.1 % external cream Apply topically 2 times daily as needed for  irritation (anal pruritis) 30 g 3     venlafaxine (EFFEXOR XR) 75 MG 24 hr capsule Take 1 capsule (75 mg) by mouth daily 95 capsule 3      No Known Allergies     Physical Examination Review of Systems   Vitals: /72   Pulse 77   Wt 79 kg (174 lb 3.2 oz)   BMI 27.28 kg/m    BMI= Body mass index is 27.28 kg/m .  Wt Readings from Last 3 Encounters:   11/30/22 79 kg (174 lb 3.2 oz)   11/16/22 76.2 kg (168 lb)   09/02/22 71.2 kg (157 lb)       General Appearance:   Pleasant male, appears stated age. no acute distress, normal body habitus   ENT/Mouth: membranes moist, no apparent gingival bleeding.      EYES:  no scleral icterus, normal conjunctivae   Neck: no carotid bruits. supple   Respiratory:   lungs are clear to auscultation, no rales or wheezing, equal chest wall expansion    Cardiovascular:   Regular rhythm, normal rate. Normal first and second heart sounds with no murmurs, rubs, or gallops; Jugular venous pressure normal, no edema bilaterally    Abdomen/GI:  Soft, non-tender   Extremities: no cyanosis or clubbing   Skin: no xanthelasma, warm.    Heme/lymph/ Immunology No apparent bleeding noted.   Neurologic: Alert and oriented. normal gait, no tremors   Psychiatric: Pleasant, calm, appropriate affect.         Please refer above for cardiac ROS details.       Past History   Past Medical History:   Past Medical History:   Diagnosis Date     DMII (diabetes mellitus, type 2) (H)         Past Surgical History:   Past Surgical History:   Procedure Laterality Date     COLONOSCOPY  01/27/2009     COLONOSCOPY  11/21/2017    Negative for microscopic colitis     CT CALCIUM SCREENING  07/02/2018    Total Agatston calcium score is 393     HEMICOLECTOMY, RT/LT  06/26/2014     MRI BIOPSY PROSTATE      2008     REPAIR TENDON ANKLE  02/13/2002     WISDOM TOOTH EXTRACTION      1971        Family History:   Family History   Problem Relation Age of Onset     Heart Disease Mother      Cerebrovascular Disease Mother       Heart Surgery Father         Social History:   Social History     Socioeconomic History     Marital status: Single     Spouse name: Not on file     Number of children: Not on file     Years of education: Not on file     Highest education level: Not on file   Occupational History     Not on file   Tobacco Use     Smoking status: Never     Smokeless tobacco: Current     Types: Chew   Substance and Sexual Activity     Alcohol use: Not on file     Drug use: Not on file     Sexual activity: Not on file   Other Topics Concern     Not on file   Social History Narrative     Not on file     Social Determinants of Health     Financial Resource Strain: Not on file   Food Insecurity: Not on file   Transportation Needs: Not on file   Physical Activity: Not on file   Stress: Not on file   Social Connections: Not on file   Intimate Partner Violence: Not on file   Housing Stability: Not on file            Lab Results    Chemistry/lipid CBC Cardiac Enzymes/BNP/TSH/INR   Lab Results   Component Value Date    CHOL 148 10/30/2021    HDL 66 10/30/2021    TRIG 77 10/30/2021    BUN 15.4 09/02/2022     09/02/2022    CO2 25 09/02/2022    Lab Results   Component Value Date    WBC 4.9 09/02/2022    HGB 13.8 09/02/2022    HCT 41.6 09/02/2022    MCV 94 09/02/2022     09/02/2022    Lab Results   Component Value Date    TSH 1.25 09/02/2022

## 2022-11-30 NOTE — LETTER
11/30/2022    eMek Serna, [place]  [address] [facility] [address]    RE: Nadeem Paulino       Dear Colleague,     I had the pleasure of seeing Nadeem Paulino in the [facility].    [facility]   [address], [place] [address]   [email or web address]   OFFICE: [phone]     CARDIOLOGY CLINIC NOTE     Thank you, Meek Mcgill, for asking the [facility] team to see Mr. Nadeem Paulino to evaluate New Patient (High score on recent testing...referred by Meek Serna)        Assessment/Recommendations   Assessment:    1. Coronary artery disease - denies anginal symptoms. Has high risk cardiac calcium score, predominantly in the LAD coronary artery. LDL is already <70 but Nadeem is not on statin therapy.  2. DMII - controlled with metformin.   3. Nicotine use - uses snuff. Recommended nicotine cessation and abstinence.    Plan:  1. Exercise stress echo to assess for ischemia given high risk calcium score.  2. Start rosuvastatin 5 mg daily.          History of Present Illness   Mr. Nadeem Paulino is a 72 year old male with a significant past history of DMII who presents for evaluation of an abnormal cardiac calcium score.    Lt. ColAnny Paulino recently had a CT cardiac calcium score to evaluate for coronary artery disease.  This resulted in a calcium score of 508 which is in the 72nd percentile, with most of the calcium located in the LAD coronary artery.  He was very active up until approximately 3 years ago and has not been getting regular exercise since the beginning of the COVID pandemic.  With activity he does perform he denies exertional dyspnea or chest discomfort.  He has a long 20-year history of diabetes which has historically been quite well controlled.  He has no history of hyperlipidemia.  He has remote history of smoking pipe tobacco and has used snuff since joining the [employer] in the 1970s.    Other than  noted above, Mr. Paulino denies any chest pain/pressure/tightness, shortness of breath at rest or with exertion, light headedness/dizziness, pre-syncope, syncope, lower extremity swelling, palpitations, paroxysmal nocturnal dyspnea (PND), or orthopnea.     Cardiac Problems and Cardiac Diagnostics     Most Recent Cardiac testing:  ECG dated 6/4/14 (personaly reviewed and interpreted): sinus rhythm with left axis deviation    CT cardiac calcium score 10/26/22    The total Agatston score is 508. A calcium score in this range places the individual in the 72nd percentile when compared to an age and gender matched control group.  LM - 0  LAD - 396  Cx - 33  RCA - 79       Medications  Allergies   Current Outpatient Medications   Medication Sig Dispense Refill     biotin 300 MCG TABS tablet Take 300 mcg by mouth daily       bromocriptine (PARLODEL) 2.5 MG tablet 1.25 mg       CYCLOSET 0.8 MG TABS tablet Take 3 tablets (2.4 mg) by mouth every morning 270 tablet 3     diphenoxylate-atropine (LOMOTIL) 2.5-0.025 MG tablet Take 1 tablet by mouth 4 times daily as needed for diarrhea 95 tablet 3     dutasteride (AVODART) 0.5 MG capsule TAKE ONE CAPSULE (0.5 MG) BY MOUTH DAILY 95 capsule 3     glimepiride (AMARYL) 2 MG tablet Take 1 tablet (2 mg) by mouth every morning (before breakfast) 95 tablet 3     ibuprofen (ADVIL/MOTRIN) 600 MG tablet Take 600 mg by mouth as needed       lidocaine (XYLOCAINE) 5 % external ointment Apply topically 3 times daily as needed for moderate pain (anal pruritis) 15 g 3     metFORMIN (GLUCOPHAGE XR) 500 MG 24 hr tablet Take 4 tablets (2,000 mg) by mouth daily 370 tablet 3     modafinil (PROVIGIL) 200 MG tablet Take 1 tablet (200 mg) by mouth every morning 30 tablet 5     oxyCODONE-acetaminophen (PERCOCET) 5-325 MG tablet Take 1 tablet by mouth every 4 hours as needed (back pain, right knee pain, left arm pain) 30 tablet 0     rosuvastatin (CRESTOR) 5 MG tablet Take 1 tablet (5 mg) by mouth daily 30  tablet 11     sildenafil (VIAGRA) 100 MG tablet Take 1 tablet (100 mg) by mouth daily as needed 30 tablet 3     triamcinolone (KENALOG) 0.1 % external cream Apply topically 2 times daily as needed for irritation (anal pruritis) 30 g 3     venlafaxine (EFFEXOR XR) 75 MG 24 hr capsule Take 1 capsule (75 mg) by mouth daily 95 capsule 3      No Known Allergies     Physical Examination Review of Systems   Vitals: /72   Pulse 77   Wt 79 kg (174 lb 3.2 oz)   BMI 27.28 kg/m    BMI= Body mass index is 27.28 kg/m .  Wt Readings from Last 3 Encounters:   11/30/22 79 kg (174 lb 3.2 oz)   11/16/22 76.2 kg (168 lb)   09/02/22 71.2 kg (157 lb)       General Appearance:   Pleasant male, appears stated age. no acute distress, normal body habitus   ENT/Mouth: membranes moist, no apparent gingival bleeding.      EYES:  no scleral icterus, normal conjunctivae   Neck: no carotid bruits. supple   Respiratory:   lungs are clear to auscultation, no rales or wheezing, equal chest wall expansion    Cardiovascular:   Regular rhythm, normal rate. Normal first and second heart sounds with no murmurs, rubs, or gallops; Jugular venous pressure normal, no edema bilaterally    Abdomen/GI:  Soft, non-tender   Extremities: no cyanosis or clubbing   Skin: no xanthelasma, warm.    Heme/lymph/ Immunology No apparent bleeding noted.   Neurologic: Alert and oriented. normal gait, no tremors   Psychiatric: Pleasant, calm, appropriate affect.         Please refer above for cardiac ROS details.       Past History   Past Medical History:   Past Medical History:   Diagnosis Date     DMII (diabetes mellitus, type 2) (H)         Past Surgical History:   Past Surgical History:   Procedure Laterality Date     COLONOSCOPY  01/27/2009     COLONOSCOPY  11/21/2017    Negative for microscopic colitis     CT CALCIUM SCREENING  07/02/2018    Total Agatston calcium score is 393     HEMICOLECTOMY, RT/LT  06/26/2014     MRI BIOPSY PROSTATE      2008     REPAIR  TENDON ANKLE  02/13/2002     WISDOM TOOTH EXTRACTION      1971        Family History:   Family History   Problem Relation Age of Onset     Heart Disease Mother      Cerebrovascular Disease Mother      Heart Surgery Father         Social History:   Social History     Socioeconomic History     Marital status: Single     Spouse name: Not on file     Number of children: Not on file     Years of education: Not on file     Highest education level: Not on file   Occupational History     Not on file   Tobacco Use     Smoking status: Never     Smokeless tobacco: Current     Types: Chew   Substance and Sexual Activity     Alcohol use: Not on file     Drug use: Not on file     Sexual activity: Not on file   Other Topics Concern     Not on file   Social History Narrative     Not on file     Social Determinants of Health     Financial Resource Strain: Not on file   Food Insecurity: Not on file   Transportation Needs: Not on file   Physical Activity: Not on file   Stress: Not on file   Social Connections: Not on file   Intimate Partner Violence: Not on file   Housing Stability: Not on file            Lab Results    Chemistry/lipid CBC Cardiac Enzymes/BNP/TSH/INR   Lab Results   Component Value Date    CHOL 148 10/30/2021    HDL 66 10/30/2021    TRIG 77 10/30/2021    BUN 15.4 09/02/2022     09/02/2022    CO2 25 09/02/2022    Lab Results   Component Value Date    WBC 4.9 09/02/2022    HGB 13.8 09/02/2022    HCT 41.6 09/02/2022    MCV 94 09/02/2022     09/02/2022    Lab Results   Component Value Date    TSH 1.25 09/02/2022                Thank you for allowing me to participate in the care of your patient.      Sincerely,     Micheal Cotter MD     [facility]  cc:   RILEY Villareal  [place] [address]

## 2022-12-09 ENCOUNTER — TELEPHONE (OUTPATIENT)
Dept: ONCOLOGY | Facility: CLINIC | Age: 72
End: 2022-12-09

## 2022-12-09 NOTE — TELEPHONE ENCOUNTER
Left many voice mails for patient to call back to reschedule appt.  Appt scheduled with wrong provider. Dr. Fiore no longer seeing patients for ED at this location.  I have canceled the appt that was scheduled for 1/17/23.    Patient called on:   12/5/22 vm left in the am (2 times)  12/6/22 vm left (1)  12/8/22 vm left (1)

## 2023-01-18 DIAGNOSIS — H60.312 ACUTE DIFFUSE OTITIS EXTERNA OF LEFT EAR: ICD-10-CM

## 2023-01-18 RX ORDER — NEOMYCIN SULFATE, POLYMYXIN B SULFATE AND HYDROCORTISONE 10; 3.5; 1 MG/ML; MG/ML; [USP'U]/ML
SUSPENSION/ DROPS AURICULAR (OTIC)
Qty: 5 ML | Refills: 5 | Status: SHIPPED | OUTPATIENT
Start: 2023-01-18 | End: 2023-01-25

## 2023-01-18 NOTE — TELEPHONE ENCOUNTER
Routing refill request to provider for review/approval because:  Drug not on the [facility] refill protocol   LOV 11/16/2022     LOUANN Damico  [facility]

## 2023-02-08 ENCOUNTER — HOSPITAL ENCOUNTER (OUTPATIENT)
Dept: CARDIOLOGY | Facility: CLINIC | Age: 73
Discharge: HOME | End: 2023-02-08
Attending: INTERNAL MEDICINE | Admitting: INTERNAL MEDICINE
Payer: MEDICARE

## 2023-02-08 DIAGNOSIS — I25.10 CORONARY ARTERY CALCIFICATION SEEN ON CT SCAN: ICD-10-CM

## 2023-02-08 PROCEDURE — 93016 CV STRESS TEST SUPVJ ONLY: CPT | Performed by: INTERNAL MEDICINE

## 2023-02-08 PROCEDURE — 93352 ADMIN ECG CONTRAST AGENT: CPT | Performed by: INTERNAL MEDICINE

## 2023-02-08 PROCEDURE — 255N000002 HC RX 255 OP 636: Performed by: INTERNAL MEDICINE

## 2023-02-08 PROCEDURE — 93018 CV STRESS TEST I&R ONLY: CPT | Performed by: INTERNAL MEDICINE

## 2023-02-08 PROCEDURE — 93321 DOPPLER ECHO F-UP/LMTD STD: CPT | Mod: 26 | Performed by: INTERNAL MEDICINE

## 2023-02-08 PROCEDURE — 93350 STRESS TTE ONLY: CPT | Mod: 26 | Performed by: INTERNAL MEDICINE

## 2023-02-08 PROCEDURE — 93325 DOPPLER ECHO COLOR FLOW MAPG: CPT | Mod: 26 | Performed by: INTERNAL MEDICINE

## 2023-02-08 PROCEDURE — C8928 TTE W OR W/O FOL W/CON,STRES: HCPCS

## 2023-02-08 RX ADMIN — PERFLUTREN 6 ML: 6.52 INJECTION, SUSPENSION INTRAVENOUS at 13:30

## 2023-03-01 DIAGNOSIS — E11.9 TYPE 2 DIABETES MELLITUS WITHOUT COMPLICATION, WITHOUT LONG-TERM CURRENT USE OF INSULIN (H): ICD-10-CM

## 2023-03-01 RX ORDER — METFORMIN HCL 500 MG
2000 TABLET, EXTENDED RELEASE 24 HR ORAL DAILY
Qty: 370 TABLET | Refills: 3 | Status: SHIPPED | OUTPATIENT
Start: 2023-03-01 | End: 2023-03-31

## 2023-03-01 NOTE — TELEPHONE ENCOUNTER
Routing refill request to provider for review/approval because:  LOV 11/16/2022    Biguanide Agents Failed       Patient has documented A1c within the specified period of time.        LOUANN Damico  [facility]

## 2023-03-09 DIAGNOSIS — R19.7 DIARRHEA, UNSPECIFIED TYPE: ICD-10-CM

## 2023-03-09 NOTE — TELEPHONE ENCOUNTER
Medication Question or Refill        What medication are you calling about (include dose and sig)?: Lomotil 2.5-0.025mg tab    Preferred Pharmacy:   [id number] [place], [place] - [address]  [address] [address]  Phone: [phone] Fax: [phone]      Controlled Substance Agreement on file:   CSA -- Patient Level:    CSA: None found at the patient level.       Who prescribed the medication?: Dr. Majano filled last time    Do you need a refill? Yes    Okay to leave a detailed message?: Yes at Home number on file [phone] (home)

## 2023-03-13 NOTE — TELEPHONE ENCOUNTER
Routing refill request to provider for review/approval because:  Drug not on the [facility] refill protocol     Last Written Prescription Date:  5/12/22  Last Fill Quantity: 95,  # refills: 3   Last office visit provider:  11/16/22     Requested Prescriptions   Pending Prescriptions Disp Refills     diphenoxylate-atropine (LOMOTIL) 2.5-0.025 MG tablet 95 tablet 3     Sig: Take 1 tablet by mouth 4 times daily as needed for diarrhea       There is no refill protocol information for this order          Maria Esther Steven RN 03/13/23 5:58 PM

## 2023-03-14 RX ORDER — DIPHENOXYLATE HCL/ATROPINE 2.5-.025MG
1 TABLET ORAL 4 TIMES DAILY PRN
Qty: 95 TABLET | Refills: 3 | Status: SHIPPED | OUTPATIENT
Start: 2023-03-14 | End: 2023-05-10

## 2023-03-23 DIAGNOSIS — E78.2 MIXED HYPERLIPIDEMIA: ICD-10-CM

## 2023-03-23 DIAGNOSIS — E11.9 TYPE 2 DIABETES MELLITUS WITHOUT COMPLICATION, WITHOUT LONG-TERM CURRENT USE OF INSULIN (H): Primary | ICD-10-CM

## 2023-03-27 ENCOUNTER — LAB (OUTPATIENT)
Dept: LAB | Facility: CLINIC | Age: 73
End: 2023-03-27
Payer: MEDICARE

## 2023-03-27 DIAGNOSIS — G98.8 DISORDER OF NERVOUS SYSTEM DUE TO TYPE 2 DIABETES MELLITUS (H): ICD-10-CM

## 2023-03-27 DIAGNOSIS — Z11.59 NEED FOR HEPATITIS C SCREENING TEST: ICD-10-CM

## 2023-03-27 DIAGNOSIS — E11.69 DISORDER OF NERVOUS SYSTEM DUE TO TYPE 2 DIABETES MELLITUS (H): ICD-10-CM

## 2023-03-27 DIAGNOSIS — E11.9 TYPE 2 DIABETES MELLITUS WITHOUT COMPLICATION, WITHOUT LONG-TERM CURRENT USE OF INSULIN (H): ICD-10-CM

## 2023-03-27 DIAGNOSIS — E78.2 MIXED HYPERLIPIDEMIA: ICD-10-CM

## 2023-03-27 LAB
ANION GAP SERPL CALCULATED.3IONS-SCNC: 9 MMOL/L (ref 7–15)
BUN SERPL-MCNC: 12.9 MG/DL (ref 8–23)
CALCIUM SERPL-MCNC: 9.2 MG/DL (ref 8.8–10.2)
CHLORIDE SERPL-SCNC: 104 MMOL/L (ref 98–107)
CHOLEST SERPL-MCNC: 94 MG/DL
CREAT SERPL-MCNC: 0.83 MG/DL (ref 0.67–1.17)
DEPRECATED HCO3 PLAS-SCNC: 30 MMOL/L (ref 22–29)
GFR SERPL CREATININE-BSD FRML MDRD: >90 ML/MIN/1.73M2
GLUCOSE SERPL-MCNC: 111 MG/DL (ref 70–99)
HBA1C MFR BLD: 6 % (ref 0–5.6)
HCV AB SERPL QL IA: NONREACTIVE
HDLC SERPL-MCNC: 53 MG/DL
LDLC SERPL CALC-MCNC: 30 MG/DL
NONHDLC SERPL-MCNC: 41 MG/DL
POTASSIUM SERPL-SCNC: 4.7 MMOL/L (ref 3.4–5.3)
SODIUM SERPL-SCNC: 143 MMOL/L (ref 136–145)
TRIGL SERPL-MCNC: 55 MG/DL

## 2023-03-27 PROCEDURE — 80061 LIPID PANEL: CPT

## 2023-03-27 PROCEDURE — 83036 HEMOGLOBIN GLYCOSYLATED A1C: CPT

## 2023-03-27 PROCEDURE — 36415 COLL VENOUS BLD VENIPUNCTURE: CPT

## 2023-03-27 PROCEDURE — 80048 BASIC METABOLIC PNL TOTAL CA: CPT

## 2023-03-27 PROCEDURE — 86803 HEPATITIS C AB TEST: CPT

## 2023-03-31 ENCOUNTER — OFFICE VISIT (OUTPATIENT)
Dept: FAMILY MEDICINE | Facility: CLINIC | Age: 73
End: 2023-03-31
Payer: MEDICARE

## 2023-03-31 VITALS
OXYGEN SATURATION: 97 % | TEMPERATURE: 97.9 F | WEIGHT: 158 LBS | BODY MASS INDEX: 24.8 KG/M2 | HEART RATE: 82 BPM | RESPIRATION RATE: 18 BRPM | HEIGHT: 67 IN | DIASTOLIC BLOOD PRESSURE: 62 MMHG | SYSTOLIC BLOOD PRESSURE: 124 MMHG

## 2023-03-31 DIAGNOSIS — Z00.00 ENCOUNTER FOR ANNUAL WELLNESS EXAM IN MEDICARE PATIENT: Primary | ICD-10-CM

## 2023-03-31 DIAGNOSIS — Z00.00 ENCOUNTER FOR MEDICARE ANNUAL WELLNESS EXAM: ICD-10-CM

## 2023-03-31 DIAGNOSIS — Z13.6 SCREENING FOR AAA (ABDOMINAL AORTIC ANEURYSM): ICD-10-CM

## 2023-03-31 DIAGNOSIS — G98.8 DISORDER OF NERVOUS SYSTEM DUE TO TYPE 2 DIABETES MELLITUS (H): ICD-10-CM

## 2023-03-31 DIAGNOSIS — N40.0 BENIGN PROSTATIC HYPERPLASIA, UNSPECIFIED WHETHER LOWER URINARY TRACT SYMPTOMS PRESENT: ICD-10-CM

## 2023-03-31 DIAGNOSIS — I25.10 CORONARY ARTERY CALCIFICATION SEEN ON CT SCAN: ICD-10-CM

## 2023-03-31 DIAGNOSIS — E11.69 DISORDER OF NERVOUS SYSTEM DUE TO TYPE 2 DIABETES MELLITUS (H): ICD-10-CM

## 2023-03-31 DIAGNOSIS — E11.9 TYPE 2 DIABETES MELLITUS WITHOUT COMPLICATION, WITHOUT LONG-TERM CURRENT USE OF INSULIN (H): ICD-10-CM

## 2023-03-31 DIAGNOSIS — Z12.5 SCREENING FOR PROSTATE CANCER: ICD-10-CM

## 2023-03-31 DIAGNOSIS — F41.1 GENERALIZED ANXIETY DISORDER: ICD-10-CM

## 2023-03-31 PROCEDURE — G0439 PPPS, SUBSEQ VISIT: HCPCS | Performed by: PHYSICIAN ASSISTANT

## 2023-03-31 PROCEDURE — 99207 PR FOOT EXAM NO CHARGE: CPT | Performed by: PHYSICIAN ASSISTANT

## 2023-03-31 RX ORDER — DUTASTERIDE 0.5 MG/1
CAPSULE, LIQUID FILLED ORAL
Qty: 95 CAPSULE | Refills: 3 | Status: SHIPPED | OUTPATIENT
Start: 2023-03-31 | End: 2024-05-01

## 2023-03-31 RX ORDER — HYDROCORTISONE ACETATE 25 MG
SUPPOSITORY, RECTAL RECTAL
COMMUNITY
Start: 2023-03-01

## 2023-03-31 RX ORDER — GLIMEPIRIDE 2 MG/1
2 TABLET ORAL
Qty: 95 TABLET | Refills: 3 | Status: SHIPPED | OUTPATIENT
Start: 2023-03-31 | End: 2024-03-01

## 2023-03-31 RX ORDER — METFORMIN HCL 500 MG
2000 TABLET, EXTENDED RELEASE 24 HR ORAL DAILY
Qty: 370 TABLET | Refills: 3 | Status: SHIPPED | OUTPATIENT
Start: 2023-03-31 | End: 2024-05-31

## 2023-03-31 RX ORDER — VENLAFAXINE HYDROCHLORIDE 75 MG/1
75 CAPSULE, EXTENDED RELEASE ORAL DAILY
Qty: 95 CAPSULE | Refills: 3 | Status: SHIPPED | OUTPATIENT
Start: 2023-03-31 | End: 2024-07-03

## 2023-03-31 RX ORDER — ROSUVASTATIN CALCIUM 5 MG/1
5 TABLET, COATED ORAL DAILY
Qty: 30 TABLET | Refills: 11 | Status: SHIPPED | OUTPATIENT
Start: 2023-03-31 | End: 2023-05-10

## 2023-03-31 ASSESSMENT — ENCOUNTER SYMPTOMS
ABDOMINAL PAIN: 0
HEMATURIA: 0
JOINT SWELLING: 0
MYALGIAS: 1
EYE PAIN: 0
SHORTNESS OF BREATH: 0
SORE THROAT: 0
NAUSEA: 0
DIZZINESS: 0
HEADACHES: 0
FREQUENCY: 0
FEVER: 0
COUGH: 0
DYSURIA: 0
CONSTIPATION: 0
CHILLS: 0
WEAKNESS: 0
PALPITATIONS: 0
HEMATOCHEZIA: 0
NERVOUS/ANXIOUS: 0
PARESTHESIAS: 0
HEARTBURN: 0
DIARRHEA: 1
ARTHRALGIAS: 1

## 2023-03-31 ASSESSMENT — ACTIVITIES OF DAILY LIVING (ADL): CURRENT_FUNCTION: NO ASSISTANCE NEEDED

## 2023-03-31 NOTE — PATIENT INSTRUCTIONS
Patient Education   Personalized Prevention Plan  You are due for the preventive services outlined below.  Your care team is available to assist you in scheduling these services.  If you have already completed any of these items, please share that information with your care team to update in your medical record.  Health Maintenance Due   Topic Date Due     URINE DRUG SCREEN  Never done     Depression Action Plan  Never done     COVID-19 Vaccine (1) Never done     AORTIC ANEURYSM SCREENING (SYSTEM ASSIGNED)  Never done     Flu Vaccine (1) 09/01/2022     Kidney Microalbumin Urine Test  10/30/2022     Eye Exam  11/01/2022     Your Health Risk Assessment indicates you feel you are not in good health    A healthy lifestyle helps keep the body fit and the mind alert. It helps protect you from disease, helps you fight disease, and helps prevent chronic disease (disease that doesn't go away) from getting worse. This is important as you get older and begin to notice twinges in muscles and joints and a decline in the strength and stamina you once took for granted. A healthy lifestyle includes good healthcare, good nutrition, weight control, recreation, and regular exercise. Avoid harmful substances and do what you can to keep safe. Another part of a healthy lifestyle is stay mentally active and socially involved.    Good healthcare     Have a wellness visit every year.     If you have new symptoms, let us know right away. Don't wait until the next checkup.     Take medicines exactly as prescribed and keep your medicines in a safe place. Tell us if your medicine causes problems.   Healthy diet and weight control     Eat 3 or 4 small, nutritious, low-fat, high-fiber meals a day. Include a variety of fruits, vegetables, and whole-grain foods.     Make sure you get enough calcium in your diet. Calcium, vitamin D, and exercise help prevent osteoporosis (bone thinning).     If you live alone, try eating with others when you can.  That way you get a good meal and have company while you eat it.     Try to keep a healthy weight. If you eat more calories than your body uses for energy, it will be stored as fat and you will gain weight.     Recreation   Recreation is not limited to sports and team events. It includes any activity that provides relaxation, interest, enjoyment, and exercise. Recreation provides an outlet for physical, mental, and social energy. It can give a sense of worth and achievement. It can help you stay healthy.    Mental Exercise and Social Involvement  Mental and emotional health is as important as physical health. Keep in touch with friends and family. Stay as active as possible. Continue to learn and challenge yourself.   Things you can do to stay mentally active are:    Learn something new, like a foreign language or musical instrument.     Play SCRABBLE or do crossword puzzles. If you cannot find people to play these games with you at home, you can play them with others on your computer through the Internet.     Join a games club--anything from card games to chess or checkers or lawn bowling.     Start a new hobby.     Go back to school.     Volunteer.     Read.   Keep up with world events.    Exercise for a Healthier Heart  You may wonder how you can improve the health of your heart. If you re thinking about exercise, you re on the right track. You don t need to become an athlete. But you do need a certain amount of brisk exercise to help strengthen your heart. If you have been diagnosed with a heart condition, your healthcare provider may advise exercise to help your condition. To help make exercise a habit, choose safe, fun activities.      Exercise with a friend. When activity is fun, you're more likely to stick with it.     Before you start  Check with your healthcare provider before starting an exercise program. This is especially important if you haven't been active for a while. It's also important if you have a  long-term (chronic) health problem such as heart disease, diabetes, or obesity. Also check with your provider if you're at high risk for having these problems.   Why exercise?  Exercising regularly offers many healthy rewards. It can help you do all of these:     Improve your blood cholesterol level to help prevent further heart trouble.    Lower your blood pressure to help prevent a stroke or heart attack.    Control diabetes or reduce your risk of getting this disease.    Improve your heart and lung function.    Reach and stay at a healthy weight.    Make your muscles stronger so you can stay active.    Prevent falls and fractures by slowing the loss of bone mass (osteoporosis).    Manage stress better.    Improve your sense of self and your body image.  Exercise tips      Ease into your routine. Set small goals. Then build on them. Talk with your healthcare provider first before starting an exercise routine if you're not sure what your activity level should be.    Exercise on most days. Aim for a total of at least 150 minutes (2 hours and 30 minutes) or more of moderate-intensity aerobic activity each week. You could also do 75 minutes (1 hour and 15 minutes) or more of vigorous-intensity aerobic activity each week. Or try for a combination of both. Moderate activity means that you breathe heavier and your heart rate increases, but you can still talk. Think about doing at least 30 minutes of moderate exercise, 5 times a week. It's OK to work up to the 30-minute period over time. Examples of moderate-intensity activity are brisk walking, gardening, and water aerobics.    Step up your daily activity level.  Along with your exercise program, try being more active the whole day. Walk instead of drive. Or park further away so that you take more steps each day. Do more household tasks or yard work. You may not be able to meet the advised amount of physical activity. But doing some moderate- or vigorous-intensity  aerobic activity can help reduce your risk for heart disease. Your healthcare provider can help you figure out what is best for you.    Choose 1 or more activities you enjoy.  Walking is one of the easiest things you can do. You can also try swimming, riding a bike, dancing, or taking an exercise class.    Call 911  Call 911 right away if any of these occur:     Chest pain that doesn't go away quickly with rest    New burning, tightness, pressure, or heaviness in your chest, neck, shoulders, back, or arms    Abnormal or severe shortness of breath    A very fast or irregular heartbeat (palpitations)    Fainting  When to call your healthcare provider  Call your healthcare provider if you have any of these:     Dizziness or lightheadedness    Mild shortness of breath or chest pain    Increased or new joint or muscle pain    [employer] last reviewed this educational content on 7/1/2022 2000-2022 [employer]. All rights reserved. This information is not intended as a substitute for professional medical care. Always follow your healthcare professional's instructions.          Understanding USDA MyPlate  The USDA has guidelines to help you make healthy food choices. These are called MyPlate. MyPlate shows the food groups that make up healthy meals using the image of a place setting. Before you eat, think about the healthiest choices for what to put on your plate or in your cup or bowl. To learn more about building a healthy plate, visit www.choosemyplate.gov.     The food groups    Fruits. Any fruit or 100% fruit juice counts as part of the Fruit Group. Fruits may be fresh, canned, frozen, or dried, and may be whole, cut-up, or pureed. Make 1/2 of your plate fruits and vegetables.    Vegetables. Any vegetable or 100% vegetable juice counts as a member of the Vegetable Group. Vegetables may be fresh, frozen, canned, or dried. They can be served raw or cooked and may be whole, cut-up, or mashed. Make 1/2 of  your plate fruits and vegetables.    Grains. All foods made from grains are part of the Grains Group. These include wheat, rice, oats, cornmeal, and barley. Grains are often used to make foods such as bread, pasta, oatmeal, cereal, tortillas, and grits. Grains should be no more than 1/4 of your plate. At least half of your grains should be whole grains.    Protein. This group includes meat, poultry, seafood, beans and peas, eggs, processed soy products (such as tofu), nuts (including nut butters), and seeds. Make protein choices no more than 1/4 of your plate. Meat and poultry choices should be lean or low fat.    Dairy. The Dairy Group includes all fluid milk products and foods made from milk that contain calcium, such as yogurt and cheese. (Foods that have little calcium, such as cream, butter, and cream cheese, are not part of this group.) Most dairy choices should be low-fat or fat-free.    Oils. Oils aren't a food group, but they do contain essential nutrients. However it's important to watch your intake of oils. These are fats that are liquid at room temperature. They include canola, corn, olive, soybean, vegetable, and sunflower oil. Foods that are mainly oil include mayonnaise, certain salad dressings, and soft margarines. You likely already get your daily oil allowance from the foods you eat.  Things to limit  Eating healthy also means limiting these things in your diet:    Salt (sodium). Many processed foods have a lot of sodium. To keep sodium intake down, eat fresh vegetables, meats, poultry, and seafood when possible. Purchase low-sodium, reduced-sodium, or no-salt-added food products at the store. And don't add salt to your meals at home. Instead, season them with herbs and spices such as dill, oregano, cumin, and paprika. Or try adding flavor with lemon or lime zest and juice.    Saturated fat. Saturated fats are most often found in animal products such as beef, pork, and chicken. They are often solid  at room temperature, such as butter. To reduce your saturated fat intake, choose leaner cuts of meat and poultry. And try healthier cooking methods such as grilling, broiling, roasting, or baking. For a simple lower-fat swap, use plain nonfat yogurt instead of mayonnaise when making potato salad or macaroni salad.    Added sugars. These are sugars added to foods. They are in foods such as ice cream, candy, soda, fruit drinks, sports drinks, energy drinks, cookies, pastries, jams, and syrups. Cut down on added sugars by sharing sweet treats with a family member or friend. You can also choose fruit for dessert, and drink water or other unsweetened beverages.  [employer] last reviewed this educational content on 6/1/2020 2000-2022 [employer]. All rights reserved. This information is not intended as a substitute for professional medical care. Always follow your healthcare professional's instructions.          Signs of Hearing Loss  Hearing loss is a problem shared by many people. In fact, it's one of the most common health problems, particularly as people age. Most people aged 65 and older have some hearing loss. By age 80, almost everyone does. Hearing loss often occurs slowly over the years. So, you may not realize your hearing has gotten worse.   When sudden hearing loss occurs, it's important to contact your healthcare provider right away. Your provider will do a medical exam and a hearing exam as soon as possible. This is to help find the cause and type of your sudden hearing loss. Based on your diagnosis, your healthcare provider will discuss possible treatments.      Hearing much better with one ear can be a sign of hearing loss.     Have your hearing checked  Call your healthcare provider if you:     Have to strain to hear normal conversation    Have to watch other people s faces very carefully to follow what they re saying    Need to ask people to repeat what they ve said    Often misunderstand  what people are saying    Turn the volume of the television or radio up so high that others complain    Feel that people are mumbling when they re talking to you    Find that the effort to hear leaves you feeling tired and irritated    Notice, when using the phone, that you hear better with one ear than the other  Xuan last reviewed this educational content on 6/1/2022 2000-2022 [employer]. All rights reserved. This information is not intended as a substitute for professional medical care. Always follow your healthcare professional's instructions.

## 2023-04-04 ENCOUNTER — DOCUMENTATION ONLY (OUTPATIENT)
Dept: LAB | Facility: CLINIC | Age: 73
End: 2023-04-04
Payer: MEDICARE

## 2023-04-05 ENCOUNTER — LAB (OUTPATIENT)
Dept: LAB | Facility: CLINIC | Age: 73
End: 2023-04-05
Payer: MEDICARE

## 2023-04-05 DIAGNOSIS — Z12.5 SCREENING FOR PROSTATE CANCER: ICD-10-CM

## 2023-04-05 PROCEDURE — G0103 PSA SCREENING: HCPCS

## 2023-04-05 PROCEDURE — 36415 COLL VENOUS BLD VENIPUNCTURE: CPT

## 2023-04-06 LAB — PSA SERPL DL<=0.01 NG/ML-MCNC: 0.66 NG/ML (ref 0–6.5)

## 2023-05-03 DIAGNOSIS — G47.33 OSA (OBSTRUCTIVE SLEEP APNEA): ICD-10-CM

## 2023-05-03 RX ORDER — MODAFINIL 200 MG/1
200 TABLET ORAL EVERY MORNING
Qty: 30 TABLET | Refills: 5 | Status: SHIPPED | OUTPATIENT
Start: 2023-05-03 | End: 2024-06-26

## 2023-05-03 NOTE — TELEPHONE ENCOUNTER
Medication Question or Refill    What medication are you calling about (include dose and sig)?: Modafinil 200 MG    Preferred Pharmacy:      [id number] - [place], [place] - [address]  [address] [address]  Phone: [phone] Fax: [phone]      Controlled Substance Agreement on file:   CSA -- Patient Level:    CSA: None found at the patient level.       Who prescribed the medication?: Meek Serna    Do you need a refill? Yes    Patient offered an appointment? No    Do you have any questions or concerns?  No      Okay to leave a detailed message?: Yes at Cell number on file:    Telephone Information:   Mobile [phone]

## 2023-05-03 NOTE — TELEPHONE ENCOUNTER
Routing refill request to provider for review/approval because:  Drug not on the [facility] refill protocol         Last Written Prescription Date: 9/2/22  Last Fill Quantity: 30,  # refills: 5   Last office visit: 3/31/2023

## 2023-05-08 ENCOUNTER — TELEPHONE (OUTPATIENT)
Dept: FAMILY MEDICINE | Facility: CLINIC | Age: 73
End: 2023-05-08
Payer: MEDICARE

## 2023-05-08 NOTE — TELEPHONE ENCOUNTER
Left voicemail for patient to return call to clinic. When patient returns call, please give them below message.    After reviewing patient's chart, I do not see this medication on his med list, nor do I see it discussed in the last appointment. It looks like the last time this was prescribed for patient was 5/12/22 for 30 tables. When patient returns call, please ask patient what they take this for and how often he uses it so we can send over to Meek for review if this is appropriate.    Beverly Hull, CMA

## 2023-05-08 NOTE — TELEPHONE ENCOUNTER
Medication Question or Refill    What medication are you calling about (include dose and sig)?: Oxycodone 5-325 MG     Preferred Pharmacy:    [id number] - [place], [place] - [address]  [address] [address]  Phone: [phone] Fax: [phone]      Controlled Substance Agreement on file:   CSA -- Patient Level:    CSA: None found at the patient level.       Who prescribed the medication?: Meek Serna    Do you need a refill? Yes    Do you have any questions or concerns?  No      Okay to leave a detailed message?: Yes at Cell number on file:    Telephone Information:   Mobile [phone]

## 2023-05-08 NOTE — TELEPHONE ENCOUNTER
PT called to inquire as to status of his Rx Refill and was very upset that it wasn't at the pharmacy.    He became hostile with me at the phone and I actually had to speak over him and tell him that I'm not a nurse or healthcare provider and that I'm just the messenger, and it was ONLY then that he stopped raising his voice at me.    He sounded very annoyed and irritated.

## 2023-05-09 ENCOUNTER — OFFICE VISIT (OUTPATIENT)
Dept: CARDIOLOGY | Facility: CLINIC | Age: 73
End: 2023-05-09
Attending: INTERNAL MEDICINE
Payer: MEDICARE

## 2023-05-09 VITALS
HEART RATE: 96 BPM | WEIGHT: 171.5 LBS | BODY MASS INDEX: 25.99 KG/M2 | SYSTOLIC BLOOD PRESSURE: 112 MMHG | HEIGHT: 68 IN | RESPIRATION RATE: 16 BRPM | DIASTOLIC BLOOD PRESSURE: 52 MMHG

## 2023-05-09 DIAGNOSIS — I25.10 CORONARY ARTERY CALCIFICATION SEEN ON CT SCAN: ICD-10-CM

## 2023-05-09 DIAGNOSIS — I25.10 CORONARY ARTERY DISEASE INVOLVING NATIVE CORONARY ARTERY OF NATIVE HEART WITHOUT ANGINA PECTORIS: Primary | ICD-10-CM

## 2023-05-09 PROCEDURE — 99214 OFFICE O/P EST MOD 30 MIN: CPT | Performed by: INTERNAL MEDICINE

## 2023-05-09 NOTE — PATIENT INSTRUCTIONS
It was a pleasure to meet with you today.      Below is a summary of your visit.   Your stress echocardiogram was reassuring and did not demonstrate any significant obstruction in blood flow through your coronary arteries.  The rosuvastatin you are taking is working well to control your cholesterol levels.     Please do not hesitate to call the [facility] with any questions or concerns at [phone].     Sincerely,

## 2023-05-09 NOTE — PROGRESS NOTES
[facility]   [address], [place] [address]   [email or web address]   OFFICE: [phone]     CARDIOLOGY CLINIC NOTE     Thank you, Meek Mcgill, for asking the [facility] Heart Care team to see Mr. Nadeem Paulino to Follow Up (CAD)        Assessment/Recommendations   Assessment:    1. Coronary artery disease - denies anginal symptoms. Has high risk cardiac calcium score with a reassuring exercise stress echo.  2. DMII - controlled with metformin.   3. Nicotine use - uses snuff. Recommended nicotine cessation and abstinence.    Plan:  1. Continue medications without changes.  2. I encouraged regular exercise as tolerated  3. Follow up with me in a year or sooner if needed.         History of Present Illness   Mr. Nadeem Paulino is a 72 year old male with a significant past history of DMII who presents for evaluation of an abnormal cardiac calcium score.    Lt. ColAnny Paulino recently had a CT cardiac calcium score to evaluate for coronary artery disease.  This resulted in a calcium score of 508 which is in the 72nd percentile, with most of the calcium located in the LAD coronary artery.  An exercise stress echo was performed and he lasted 10 minutes on a Savage protocol with no evidence of ischemia.    He currently denies any exertional anginal symptoms. He was active until he threw his back out last week.     Other than noted above, Mr. Paulino denies any chest pain/pressure/tightness, shortness of breath at rest or with exertion, light headedness/dizziness, pre-syncope, syncope, lower extremity swelling, palpitations, paroxysmal nocturnal dyspnea (PND), or orthopnea.     Cardiac Problems and Cardiac Diagnostics     Most Recent Cardiac testing:  ECG dated 6/4/14 (personaly reviewed and interpreted): sinus rhythm with left axis deviation    Exercise stress echo 2/8/23  Interpretation Summary  1. Normal stress echocardiogram without evidence of stress  induced ischemia.  2. Normal resting LV systolic performance with an ejection fraction of 55-60%. There is normal improvement in left ventricular systolic performance with a peak ejection fraction of 70-75%.  3. The ECG portion of this study is abnormal and suggestive of ischemia. Specifically, with exercise there is 3-4 mm of downsloping ST depression in the inferior leads and to a lesser degree anterolateral leads. The specificity of the ECG findings, however, are felt reduced due to considerable baseline artifact during exercise, the prompt normalization of ST depression in the recovery phase, and in the setting of normal echocardiographic images  4. No anginal chest pain reported with exercise.  5. Good functional capacity for age.    CT cardiac calcium score 10/26/22    The total Agatston score is 508. A calcium score in this range places the individual in the 72nd percentile when compared to an age and gender matched control group.  LM - 0  LAD - 396  Cx - 33  RCA - 79       Medications  Allergies   Current Outpatient Medications   Medication Sig Dispense Refill     ANUCORT-HC 25 MG suppository UNWRAP AND INSERT 1 SUPPOSITORY RECTALLY TWICE DAILY AS NEEDED       biotin 300 MCG TABS tablet Take 300 mcg by mouth daily       CYCLOSET 0.8 MG TABS tablet Take 3 tablets (2.4 mg) by mouth every morning 270 tablet 3     diphenoxylate-atropine (LOMOTIL) 2.5-0.025 MG tablet Take 1 tablet by mouth 4 times daily as needed for diarrhea 95 tablet 3     dutasteride (AVODART) 0.5 MG capsule TAKE ONE CAPSULE (0.5 MG) BY MOUTH DAILY 95 capsule 3     glimepiride (AMARYL) 2 MG tablet Take 1 tablet (2 mg) by mouth every morning (before breakfast) 95 tablet 3     ibuprofen (ADVIL/MOTRIN) 600 MG tablet Take 600 mg by mouth as needed       lidocaine (XYLOCAINE) 5 % external ointment Apply topically 3 times daily as needed for moderate pain (anal pruritis) 15 g 3     metFORMIN (GLUCOPHAGE XR) 500 MG 24 hr tablet Take 4 tablets (2,000  "mg) by mouth daily 370 tablet 3     modafinil (PROVIGIL) 200 MG tablet Take 1 tablet (200 mg) by mouth every morning 30 tablet 5     rosuvastatin (CRESTOR) 5 MG tablet Take 1 tablet (5 mg) by mouth daily 30 tablet 11     sildenafil (VIAGRA) 100 MG tablet Take 1 tablet (100 mg) by mouth daily as needed 30 tablet 3     venlafaxine (EFFEXOR XR) 75 MG 24 hr capsule Take 1 capsule (75 mg) by mouth daily 95 capsule 3     bromocriptine (PARLODEL) 2.5 MG tablet 1.25 mg (Patient not taking: Reported on 5/9/2023)        No Known Allergies     Physical Examination Review of Systems   Vitals: /52 (BP Location: Right arm, Patient Position: Sitting, Cuff Size: Adult Regular)   Pulse 96   Resp 16   Ht 1.727 m (5' 8\")   Wt 77.8 kg (171 lb 8 oz)   BMI 26.08 kg/m    BMI= Body mass index is 26.08 kg/m .  Wt Readings from Last 3 Encounters:   05/09/23 77.8 kg (171 lb 8 oz)   03/31/23 71.7 kg (158 lb)   11/30/22 79 kg (174 lb 3.2 oz)       General Appearance:   Pleasant male, appears stated age. no acute distress, normal body habitus   ENT/Mouth: membranes moist, no apparent gingival bleeding.      EYES:  no scleral icterus, normal conjunctivae   Neck: supple   Respiratory:   lungs are clear to auscultation, no rales or wheezing, equal chest wall expansion    Cardiovascular:   Regular rhythm, normal rate. Normal first and second heart sounds with no murmurs, rubs, or gallops; Jugular venous pressure normal, no edema bilaterally    Extremities: no cyanosis or clubbing   Skin: no xanthelasma, warm.    Heme/lymph/ Immunology No apparent bleeding noted.   Neurologic: Alert and oriented. normal gait, no tremors   Psychiatric: Pleasant, calm, appropriate affect.         Please refer above for cardiac ROS details.       Past History   Past Medical History:   Past Medical History:   Diagnosis Date     DMII (diabetes mellitus, type 2) (H)         Past Surgical History:   Past Surgical History:   Procedure Laterality Date     " COLONOSCOPY  01/27/2009     COLONOSCOPY  11/21/2017    Negative for microscopic colitis     CT CALCIUM SCREENING  07/02/2018    Total Agatston calcium score is 393     HEMICOLECTOMY, RT/LT  06/26/2014     MRI BIOPSY PROSTATE      2008     REPAIR TENDON ANKLE  02/13/2002     WISDOM TOOTH EXTRACTION      1971        Family History:   Family History   Problem Relation Age of Onset     Heart Disease Mother      Cerebrovascular Disease Mother      Heart Surgery Father         Social History:   Social History     Socioeconomic History     Marital status: Single     Spouse name: Not on file     Number of children: Not on file     Years of education: Not on file     Highest education level: Not on file   Occupational History     Not on file   Tobacco Use     Smoking status: Never     Smokeless tobacco: Current     Types: Chew   Vaping Use     Vaping status: Never Used   Substance and Sexual Activity     Alcohol use: Not on file     Drug use: Not on file     Sexual activity: Not on file   Other Topics Concern     Not on file   Social History Narrative     Not on file     Social Determinants of Health     Financial Resource Strain: Not on file   Food Insecurity: Not on file   Transportation Needs: Not on file   Physical Activity: Not on file   Stress: Not on file   Social Connections: Not on file   Intimate Partner Violence: Not on file   Housing Stability: Not on file            Lab Results    Chemistry/lipid CBC Cardiac Enzymes/BNP/TSH/INR   Lab Results   Component Value Date    CHOL 94 03/27/2023    HDL 53 03/27/2023    TRIG 55 03/27/2023    BUN 12.9 03/27/2023     03/27/2023    CO2 30 (H) 03/27/2023    Lab Results   Component Value Date    WBC 4.9 09/02/2022    HGB 13.8 09/02/2022    HCT 41.6 09/02/2022    MCV 94 09/02/2022     09/02/2022    Lab Results   Component Value Date    TSH 1.25 09/02/2022

## 2023-05-09 NOTE — LETTER
5/9/2023    Meek Serna, [place]  [address] [address]    RE: Nadeem Paulino       Dear Colleague,     I had the pleasure of seeing Nadeem Paulino in the [facility].    [facility]   [address], [place] [address]   [email or web address]   OFFICE: [phone]     CARDIOLOGY CLINIC NOTE     Thank you, Meek Mcgill, for asking the [facility] Care team to see Mr. Nadeem Paulino to Follow Up (CAD)        Assessment/Recommendations   Assessment:    Coronary artery disease - denies anginal symptoms. Has high risk cardiac calcium score with a reassuring exercise stress echo.  DMII - controlled with metformin.   Nicotine use - uses snuff. Recommended nicotine cessation and abstinence.    Plan:  Continue medications without changes.  I encouraged regular exercise as tolerated  Follow up with me in a year or sooner if needed.         History of Present Illness   Mr. Nadeem Paulino is a 72 year old male with a significant past history of DMII who presents for evaluation of an abnormal cardiac calcium score.    Lt. Col. Paulino recently had a CT cardiac calcium score to evaluate for coronary artery disease.  This resulted in a calcium score of 508 which is in the 72nd percentile, with most of the calcium located in the LAD coronary artery.  An exercise stress echo was performed and he lasted 10 minutes on a Savage protocol with no evidence of ischemia.    He currently denies any exertional anginal symptoms. He was active until he threw his back out last week.     Other than noted above, Mr. Paulino denies any chest pain/pressure/tightness, shortness of breath at rest or with exertion, light headedness/dizziness, pre-syncope, syncope, lower extremity swelling, palpitations, paroxysmal nocturnal dyspnea (PND), or orthopnea.     Cardiac Problems and Cardiac Diagnostics     Most Recent Cardiac testing:  ECG dated 6/4/14  (personaly reviewed and interpreted): sinus rhythm with left axis deviation    Exercise stress echo 2/8/23  Interpretation Summary  1. Normal stress echocardiogram without evidence of stress induced ischemia.  2. Normal resting LV systolic performance with an ejection fraction of 55-60%. There is normal improvement in left ventricular systolic performance with a peak ejection fraction of 70-75%.  3. The ECG portion of this study is abnormal and suggestive of ischemia. Specifically, with exercise there is 3-4 mm of downsloping ST depression in the inferior leads and to a lesser degree anterolateral leads. The specificity of the ECG findings, however, are felt reduced due to considerable baseline artifact during exercise, the prompt normalization of ST depression in the recovery phase, and in the setting of normal echocardiographic images  4. No anginal chest pain reported with exercise.  5. Good functional capacity for age.    CT cardiac calcium score 10/26/22  The total Agatston score is 508. A calcium score in this range places the individual in the 72nd percentile when compared to an age and gender matched control group.  LM - 0  LAD - 396  Cx - 33  RCA - 79       Medications  Allergies   Current Outpatient Medications   Medication Sig Dispense Refill    ANUCORT-HC 25 MG suppository UNWRAP AND INSERT 1 SUPPOSITORY RECTALLY TWICE DAILY AS NEEDED      biotin 300 MCG TABS tablet Take 300 mcg by mouth daily      CYCLOSET 0.8 MG TABS tablet Take 3 tablets (2.4 mg) by mouth every morning 270 tablet 3    diphenoxylate-atropine (LOMOTIL) 2.5-0.025 MG tablet Take 1 tablet by mouth 4 times daily as needed for diarrhea 95 tablet 3    dutasteride (AVODART) 0.5 MG capsule TAKE ONE CAPSULE (0.5 MG) BY MOUTH DAILY 95 capsule 3    glimepiride (AMARYL) 2 MG tablet Take 1 tablet (2 mg) by mouth every morning (before breakfast) 95 tablet 3    ibuprofen (ADVIL/MOTRIN) 600 MG tablet Take 600 mg by mouth as needed      lidocaine  "(XYLOCAINE) 5 % external ointment Apply topically 3 times daily as needed for moderate pain (anal pruritis) 15 g 3    metFORMIN (GLUCOPHAGE XR) 500 MG 24 hr tablet Take 4 tablets (2,000 mg) by mouth daily 370 tablet 3    modafinil (PROVIGIL) 200 MG tablet Take 1 tablet (200 mg) by mouth every morning 30 tablet 5    rosuvastatin (CRESTOR) 5 MG tablet Take 1 tablet (5 mg) by mouth daily 30 tablet 11    sildenafil (VIAGRA) 100 MG tablet Take 1 tablet (100 mg) by mouth daily as needed 30 tablet 3    venlafaxine (EFFEXOR XR) 75 MG 24 hr capsule Take 1 capsule (75 mg) by mouth daily 95 capsule 3    bromocriptine (PARLODEL) 2.5 MG tablet 1.25 mg (Patient not taking: Reported on 5/9/2023)        No Known Allergies     Physical Examination Review of Systems   Vitals: /52 (BP Location: Right arm, Patient Position: Sitting, Cuff Size: Adult Regular)   Pulse 96   Resp 16   Ht 1.727 m (5' 8\")   Wt 77.8 kg (171 lb 8 oz)   BMI 26.08 kg/m    BMI= Body mass index is 26.08 kg/m .  Wt Readings from Last 3 Encounters:   05/09/23 77.8 kg (171 lb 8 oz)   03/31/23 71.7 kg (158 lb)   11/30/22 79 kg (174 lb 3.2 oz)       General Appearance:   Pleasant male, appears stated age. no acute distress, normal body habitus   ENT/Mouth: membranes moist, no apparent gingival bleeding.      EYES:  no scleral icterus, normal conjunctivae   Neck: supple   Respiratory:   lungs are clear to auscultation, no rales or wheezing, equal chest wall expansion    Cardiovascular:   Regular rhythm, normal rate. Normal first and second heart sounds with no murmurs, rubs, or gallops; Jugular venous pressure normal, no edema bilaterally    Extremities: no cyanosis or clubbing   Skin: no xanthelasma, warm.    Heme/lymph/ Immunology No apparent bleeding noted.   Neurologic: Alert and oriented. normal gait, no tremors   Psychiatric: Pleasant, calm, appropriate affect.         Please refer above for cardiac ROS details.       Past History   Past Medical " History:   Past Medical History:   Diagnosis Date    DMII (diabetes mellitus, type 2) (H)         Past Surgical History:   Past Surgical History:   Procedure Laterality Date    COLONOSCOPY  01/27/2009    COLONOSCOPY  11/21/2017    Negative for microscopic colitis    CT CALCIUM SCREENING  07/02/2018    Total Agatston calcium score is 393    HEMICOLECTOMY, RT/LT  06/26/2014    MRI BIOPSY PROSTATE      2008    REPAIR TENDON ANKLE  02/13/2002    WISDOM TOOTH EXTRACTION      1971        Family History:   Family History   Problem Relation Age of Onset    Heart Disease Mother     Cerebrovascular Disease Mother     Heart Surgery Father         Social History:   Social History     Socioeconomic History    Marital status: Single     Spouse name: Not on file    Number of children: Not on file    Years of education: Not on file    Highest education level: Not on file   Occupational History    Not on file   Tobacco Use    Smoking status: Never    Smokeless tobacco: Current     Types: Chew   Vaping Use    Vaping status: Never Used   Substance and Sexual Activity    Alcohol use: Not on file    Drug use: Not on file    Sexual activity: Not on file   Other Topics Concern    Not on file   Social History Narrative    Not on file     Social Determinants of Health     Financial Resource Strain: Not on file   Food Insecurity: Not on file   Transportation Needs: Not on file   Physical Activity: Not on file   Stress: Not on file   Social Connections: Not on file   Intimate Partner Violence: Not on file   Housing Stability: Not on file            Lab Results    Chemistry/lipid CBC Cardiac Enzymes/BNP/TSH/INR   Lab Results   Component Value Date    CHOL 94 03/27/2023    HDL 53 03/27/2023    TRIG 55 03/27/2023    BUN 12.9 03/27/2023     03/27/2023    CO2 30 (H) 03/27/2023    Lab Results   Component Value Date    WBC 4.9 09/02/2022    HGB 13.8 09/02/2022    HCT 41.6 09/02/2022    MCV 94 09/02/2022     09/02/2022    Lab Results    Component Value Date    TSH 1.25 09/02/2022                Thank you for allowing me to participate in the care of your patient.      Sincerely,     Micheal Cotter MD     [facility]  cc:   Micheal Cotter MD  [address]  [place] [id number]

## 2023-05-10 ENCOUNTER — OFFICE VISIT (OUTPATIENT)
Dept: FAMILY MEDICINE | Facility: CLINIC | Age: 73
End: 2023-05-10
Payer: MEDICARE

## 2023-05-10 VITALS
RESPIRATION RATE: 18 BRPM | HEART RATE: 70 BPM | SYSTOLIC BLOOD PRESSURE: 92 MMHG | BODY MASS INDEX: 25.31 KG/M2 | WEIGHT: 167 LBS | DIASTOLIC BLOOD PRESSURE: 52 MMHG | HEIGHT: 68 IN | OXYGEN SATURATION: 98 % | TEMPERATURE: 97.5 F

## 2023-05-10 DIAGNOSIS — I25.10 CORONARY ARTERY CALCIFICATION SEEN ON CT SCAN: ICD-10-CM

## 2023-05-10 DIAGNOSIS — G98.8 DISORDER OF NERVOUS SYSTEM DUE TO TYPE 2 DIABETES MELLITUS (H): ICD-10-CM

## 2023-05-10 DIAGNOSIS — R19.7 DIARRHEA, UNSPECIFIED TYPE: ICD-10-CM

## 2023-05-10 DIAGNOSIS — G89.29 OTHER CHRONIC PAIN: Primary | ICD-10-CM

## 2023-05-10 DIAGNOSIS — E11.69 DISORDER OF NERVOUS SYSTEM DUE TO TYPE 2 DIABETES MELLITUS (H): ICD-10-CM

## 2023-05-10 PROCEDURE — 99213 OFFICE O/P EST LOW 20 MIN: CPT | Performed by: PHYSICIAN ASSISTANT

## 2023-05-10 RX ORDER — MELOXICAM 15 MG/1
15 TABLET ORAL DAILY PRN
COMMUNITY
Start: 2023-05-08 | End: 2023-05-10

## 2023-05-10 RX ORDER — ROSUVASTATIN CALCIUM 5 MG/1
5 TABLET, COATED ORAL DAILY
Qty: 90 TABLET | Refills: 3 | Status: SHIPPED | OUTPATIENT
Start: 2023-05-10 | End: 2024-05-17

## 2023-05-10 RX ORDER — DIPHENOXYLATE HCL/ATROPINE 2.5-.025MG
1 TABLET ORAL
Qty: 95 TABLET | Refills: 3 | Status: SHIPPED | OUTPATIENT
Start: 2023-05-10 | End: 2023-10-01

## 2023-05-10 RX ORDER — OXYCODONE AND ACETAMINOPHEN 5; 325 MG/1; MG/1
1 TABLET ORAL EVERY 6 HOURS PRN
Qty: 30 TABLET | Refills: 0 | Status: SHIPPED | OUTPATIENT
Start: 2023-05-10 | End: 2023-08-18

## 2023-05-10 ASSESSMENT — ENCOUNTER SYMPTOMS
NECK PAIN: 1
ACTIVITY CHANGE: 1
MYALGIAS: 1
PARESTHESIAS: 1
BACK PAIN: 1
NECK STIFFNESS: 0
NUMBNESS: 1
ARTHRALGIAS: 1

## 2023-05-10 ASSESSMENT — PATIENT HEALTH QUESTIONNAIRE - PHQ9
10. IF YOU CHECKED OFF ANY PROBLEMS, HOW DIFFICULT HAVE THESE PROBLEMS MADE IT FOR YOU TO DO YOUR WORK, TAKE CARE OF THINGS AT HOME, OR GET ALONG WITH OTHER PEOPLE: SOMEWHAT DIFFICULT
SUM OF ALL RESPONSES TO PHQ QUESTIONS 1-9: 3
SUM OF ALL RESPONSES TO PHQ QUESTIONS 1-9: 3

## 2023-05-10 NOTE — PROGRESS NOTES
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    Weight as of this encounter: 75.8 kg (167 lb).           RILEY Reyes  [facility]    Viji Silver is a 72 year old, presenting for the following health issues:  Medication Problem        5-10-23  1:10pm   Additional Questions   Roomed by CLJ LPN   Accompanied by -     72-year-old male presents to the clinic for evaluation and clarification regards to prescriptions  He has had Percocet in the past [facility] 30 lasted him 1 year  He states he has to take Lomotil 5 times daily otherwise he has diarrhea other agents do not work he tolerates this dose without difficulty  Somehow his Crestor was ordered for 30 instead of 90 he would like a 90-day supply as he will be traveling in the near future and will run out before he returns  He is seeing Dr. Hanson tomorrow for lower back injections he would like meloxicam and tizanidine taken off his med list as they make him too fatigued and he does not wish to take them  He has no other complaints or concerns    History of Present Illness       Reason for visit:  Prescrions    He eats 2-3 servings of fruits and vegetables daily.He consumes 0 sweetened " "beverage(s) daily.He exercises with enough effort to increase his heart rate 10 to 19 minutes per day.  He exercises with enough effort to increase his heart rate 3 or less days per week.   He is taking medications regularly.    Today's PHQ-9         PHQ-9 Total Score: 3    PHQ-9 Q9 Thoughts of better off dead/self-harm past 2 weeks :   Not at all    How difficult have these problems made it for you to do your work, take care of things at home, or get along with other people: Somewhat difficult     Having problems where he needs the lomitol 5x day, and having problems with many scripts being on 30 day fills and would like 90 day supplies.            Review of Systems   Constitutional: Positive for activity change.   Musculoskeletal: Positive for arthralgias, back pain, myalgias and neck pain. Negative for neck stiffness.   Neurological: Positive for numbness and paresthesias.            Objective    BP 92/52   Pulse 70   Temp 97.5  F (36.4  C)   Resp 18   Ht 1.727 m (5' 8\")   Wt 75.8 kg (167 lb)   SpO2 98%   BMI 25.39 kg/m    Body mass index is 25.39 kg/m .  Physical Exam attentive no acute distress no formal exam repeated today                      "

## 2023-05-15 DIAGNOSIS — N52.9 ERECTILE DYSFUNCTION, UNSPECIFIED ERECTILE DYSFUNCTION TYPE: ICD-10-CM

## 2023-05-15 RX ORDER — SILDENAFIL 100 MG/1
100 TABLET, FILM COATED ORAL DAILY PRN
Qty: 10 TABLET | Refills: 0 | Status: SHIPPED | OUTPATIENT
Start: 2023-05-15 | End: 2023-07-19

## 2023-05-15 NOTE — TELEPHONE ENCOUNTER
"    Last office visit: 5/10/2023     Future Appointments 5/15/2023 - 11/11/2023      Date Visit Type Length Department Provider     7/5/2023  1:30 PM NEW ERECTILE DYSFUNCTION 30 min [facility] PHY Bety Gillespie PA-C    Location Instructions:     Clinic is located at [address] [place] [address]                   Requested Prescriptions   Pending Prescriptions Disp Refills     sildenafil (VIAGRA) 100 MG tablet 10 tablet 0     Sig: Take 1 tablet (100 mg) by mouth daily as needed (for sexual activity)       Erectile Dysfuction Protocol Passed - 5/15/2023 12:01 PM        Passed - Absence of nitrates on medication list        Passed - Absence of Alpha Blockers on Med list        Passed - Recent (12 mo) or future (30 days) visit within the authorizing provider's specialty     Patient has had an office visit with the authorizing provider or a provider within the authorizing providers department within the previous 12 mos or has a future within next 30 days. See \"Patient Info\" tab in inbasket, or \"Choose Columns\" in Meds & Orders section of the refill encounter.              Passed - Medication is active on med list        Passed - Patient is age 18 or older                   "

## 2023-06-02 ENCOUNTER — TELEPHONE (OUTPATIENT)
Dept: FAMILY MEDICINE | Facility: CLINIC | Age: 73
End: 2023-06-02
Payer: MEDICARE

## 2023-06-02 DIAGNOSIS — N40.0 BENIGN PROSTATIC HYPERPLASIA, UNSPECIFIED WHETHER LOWER URINARY TRACT SYMPTOMS PRESENT: ICD-10-CM

## 2023-06-02 RX ORDER — DUTASTERIDE 0.5 MG/1
CAPSULE, LIQUID FILLED ORAL
Qty: 95 CAPSULE | Refills: 3 | Status: CANCELLED | OUTPATIENT
Start: 2023-06-02

## 2023-06-02 NOTE — TELEPHONE ENCOUNTER
PT called and asked if [facility] can authorize this Rx, as Dr. Majano used to however he is not here at our clinic any longer.        Medication Question or Refill    What medication are you calling about (include dose and sig)?:      Preferred Pharmacy:   [id number] - [place], [place] - [address]  [address] [address]  Phone: [phone] Fax: [phone]      Controlled Substance Agreement on file:   CSA -- Patient Level:    CSA: None found at the patient level.       Who prescribed the medication?: Dr. Majano    Do you need a refill? Yes    Okay to leave a detailed message?: Yes at Home number on file [phone] (home)

## 2023-06-23 DIAGNOSIS — E11.9 TYPE 2 DIABETES MELLITUS WITHOUT COMPLICATION, WITHOUT LONG-TERM CURRENT USE OF INSULIN (H): ICD-10-CM

## 2023-06-23 RX ORDER — BROMOCRIPTINE MESYLATE 0.8 MG/1
2.4 TABLET ORAL EVERY MORNING
Qty: 270 TABLET | Refills: 0 | Status: SHIPPED | OUTPATIENT
Start: 2023-06-23 | End: 2023-10-01

## 2023-06-23 NOTE — TELEPHONE ENCOUNTER
Routing refill request to provider for review/approval because:  Drug not on the [facility] refill protocol     Last Written Prescription Date: 5/12/22  Last Fill Quantity: 270,  # refills: 3   Last office visit: 5/10/2023

## 2023-07-05 ENCOUNTER — OFFICE VISIT (OUTPATIENT)
Dept: UROLOGY | Facility: CLINIC | Age: 73
End: 2023-07-05
Attending: PHYSICIAN ASSISTANT
Payer: MEDICARE

## 2023-07-05 VITALS
HEIGHT: 68 IN | HEART RATE: 65 BPM | WEIGHT: 156 LBS | SYSTOLIC BLOOD PRESSURE: 123 MMHG | DIASTOLIC BLOOD PRESSURE: 67 MMHG | BODY MASS INDEX: 23.64 KG/M2

## 2023-07-05 DIAGNOSIS — N52.9 ERECTILE DYSFUNCTION, UNSPECIFIED ERECTILE DYSFUNCTION TYPE: Primary | ICD-10-CM

## 2023-07-05 DIAGNOSIS — R68.82 DECREASED LIBIDO: ICD-10-CM

## 2023-07-05 DIAGNOSIS — N40.0 BENIGN PROSTATIC HYPERPLASIA, UNSPECIFIED WHETHER LOWER URINARY TRACT SYMPTOMS PRESENT: ICD-10-CM

## 2023-07-05 DIAGNOSIS — Z80.42 FAMILY HISTORY OF PROSTATE CANCER: ICD-10-CM

## 2023-07-05 PROCEDURE — 99203 OFFICE O/P NEW LOW 30 MIN: CPT | Performed by: PHYSICIAN ASSISTANT

## 2023-07-05 RX ORDER — TADALAFIL 20 MG/1
20 TABLET ORAL DAILY PRN
Qty: 30 TABLET | Refills: 1 | Status: SHIPPED | OUTPATIENT
Start: 2023-07-05 | End: 2024-02-29

## 2023-07-05 ASSESSMENT — ENCOUNTER SYMPTOMS
HEMATURIA: 0
VOMITING: 1
CHILLS: 0
SHORTNESS OF BREATH: 0
NAUSEA: 1
DYSURIA: 0
FEVER: 0

## 2023-07-05 ASSESSMENT — PAIN SCALES - GENERAL: PAINLEVEL: NO PAIN (0)

## 2023-07-05 NOTE — PROGRESS NOTES
Subjective      REQUESTING PROVIDER   Meek Serna     REASON FOR CONSULT   ED    HISTORY OF PRESENT ILLNESS   Mr. Paulino is very pleasant 72 year old year old male, who presents today for further evaluation recommendations regarding erectile dysfunction.  He has been given 100 mg of sildenafil to try.  He has taken up to the maximum dosage of 100 mg.  He notes that it is harder and harder to get an erection.  He is also wondering about a psychological component.  He also feels like his libido has decreased.  Patient is on Avodart 0.5 mg.  He notes that he is really on this for cancer prevention.  He had 5 negative prostate biopsies and was in a study at [facility].  He does have a family history of prostate cancer in his maternal grandfather and 2 maternal uncles.    Patient does have some urgency, but denies dysuria, or hematuria.  He typically has nocturia x2.    Was diagnosed with diabetes years ago.  Most recent hemoglobin A1c is 6.0%.  Lipids are within normal limits.  Most recent PSA is 0.66 on Avodart.  Also previous chart notes indicate some coronary artery disease and is on a SNRI in the form of Effexor.    The following portions of the patient's history were reviewed and updated as appropriate: allergies, current medications, past family history, past medical history, past social history, past surgical history and problem list.     REVIEW OF SYSTEMS   Review of Systems   Constitutional: Negative for chills and fever.   Respiratory: Negative for shortness of breath.    Cardiovascular: Negative for chest pain.   Gastrointestinal: Positive for nausea (Recent due to food poisoning.) and vomiting (Recent due to food poisoning.).   Genitourinary: Positive for urgency. Negative for dysuria and hematuria.      Per HPI.     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)     Dyshidrosis     Primary dysthymia     Golfer's elbow     History of anemia     History of colectomy     History of colonic polyps     Astigmatism     Impotence of organic origin     Injury of olfactory nerve     Internal hemorrhoids     Internal hemorrhoids without complication     Iron deficiency     Mental disorder     Neck pain     Neuropathy     Obstructive sleep apnea (adult) (pediatric)     Arthritis     Osteopenia     Palpitations     Polyp of large intestine     Presbyopia     Proctalgia fugax     Hyperlipidemia     Seasonal allergies     Seizure disorder (H)     Arthropathy of cervical facet joint     Lupus erythematosus     Type 2 diabetes mellitus without complications (H)      Past Medical History:   Diagnosis Date     DMII (diabetes mellitus, type 2) (H)      Mumps      Prostate infection      STD (sexually transmitted disease)      Swelling of testicle       Past Surgical History:   Procedure Laterality Date     COLONOSCOPY  01/27/2009     COLONOSCOPY  11/21/2017    Negative for microscopic colitis     CT CALCIUM SCREENING  07/02/2018    Total Agatston calcium score is 393     HEMICOLECTOMY, RT/LT  06/26/2014     MRI BIOPSY PROSTATE      2008     REPAIR TENDON ANKLE  02/13/2002     WISDOM TOOTH EXTRACTION      1971      Social History:   Never smoker.  Uses chew.    Family History:   Family history of prostate cancer in 2 maternal uncles and maternal grandfather.    Objective      PHYSICAL EXAM   /67   Pulse 65   Ht 1.727 m (5' 8\")   Wt 70.8 kg (156 lb)   BMI 23.72 kg/m     Physical " Exam  HENT:      Head: Normocephalic.      Nose: Nose normal.   Eyes:      General: No scleral icterus.  Pulmonary:      Effort: Pulmonary effort is normal.   Abdominal:      General: There is no distension.   Musculoskeletal:         General: Normal range of motion.      Cervical back: Normal range of motion.   Skin:     General: Skin is warm and dry.   Neurological:      General: No focal deficit present.      Mental Status: He is alert and oriented to person, place, and time.   Psychiatric:         Mood and Affect: Mood normal.         Behavior: Behavior normal.        LABORATORY     Hemoglobin A1c 6.0%.  Cholesterol, total 94.  HDL 53.  PSA 0.66 on Avodart 0.5 mg.    Assessment & Plan    1. Erectile dysfunction, unspecified erectile dysfunction type    2. Family history of prostate cancer    3. Decreased libido    4. Benign prostatic hyperplasia, unspecified whether lower urinary tract symptoms present      I had the pleasure today of meeting with Mr. Paulino to discuss erectile dysfunction and decreased libido.  We did discuss the natural history of erectile dysfunction and to have some degree erectile dysfunction at his current age but actually very normal.  We discussed risk factors for this such as diabetes, age, cholesterol, or neurological conditions.    We discussed treatment options for erectile dysfunction.    1. PDE5I - Discussed that if a patient doesn't respond adequately to Viagra, he would be unlikely to respond to Cialis or Levitra. The indication to switch to a different PDE5I would be if he was having bothersome side effects from the initial PDE5I.  2. MUSE (intraurethral alprostadil suppository) - Would perform the first teaching session in clinic. It is only effective in 30-40% of patients, and is not as effective as intracavernosal alprostadil. Most common side effects include dysuria or pain with insertion of the suppository.   3. ICI (intracavernosal injection of alprostadil) - Typically  "most effective of the medical treatment options. At 1 year, 60-70% of patient are still using ICI. Reasons to discontinue are often if it is ineffective, unreliable, and painful. Patient are asked to rotate site of injection to prevent scar tissue from building up at one site that can cause curvature of the penis (Peyronie's disease). If the patient is on anticoagulation, he would be at increased risk for hematoma formation at the site of injection.   4. PANCHITO (vacuum erection device) - Typically the least natural and most tedious of all the options listed above, but is a great way to get blood flow into the penis and promote penile \"physical therapy\" to preserve penile length. It can be used in combination with any of the medical therapies described above. This is typically used in conjunction with a constriction ring.  5. Penile implant - The most effective of all the options listed above and is guaranteed to get the patient an erection sufficient for penetration. It has the highest satisfaction rates of all the treatment listed above (over 95%). It is a surgery that requires general anesthesia and carries a 1-2% risk of infection, and 20% risk of mechanical failure at 10 years.     We did discuss that Avodart can possibly cause difficulties with libido, but patient may also have issues with low testosterone.  His PSA remains very low.  He is slightly more inclined to trial less invasive treatment.      We will plan on the following:    -Plan on trial of Cialis 20 mg for erectile dysfunction.  Discussed possible side effects.  Showed patient how to use good Rx to make this cheaper.    -Have recommended he stop sildenafil.    -Plan on obtaining a vacuum erection device and using flexible penile constriction ring to maintain erections longer.    -We will have patient trial stopping Avodart to see if this will help his libido.    -Will trial stopping Avodart to see if this will help your libido.      -We will plan on " testosterone testing to see if this is low.  If so, would recommend referral to endocrinology for supplementation.    -Patient will contact us if he would like to have a nurse visit to trial penile injections.  He will also contact us if he would like referral to Dr. Rodgers for possible penile prosthesis.    If concern in the future for psychogenic component, can consider referral for possible sex therapy.    Discussed typical prostate cancer screening guidelines.  Patient can continue with PSA given family history.  Would expect that his PSA will increase slightly after discontinuing Avodart.    Signed by:     Bety Velasquez PA-C 7/5/2023 1:54 PM

## 2023-07-05 NOTE — LETTER
7/5/2023       RE: Nadeem Paulino  [address] [place] [facility] [address]     Dear Colleague,    Thank you for referring your patient, Nadeem Paulino, to the [facility] [place] at [facility]. Please see a copy of my visit note below.    Subjective      REQUESTING PROVIDER   Meek Serna     REASON FOR CONSULT   ED    HISTORY OF PRESENT ILLNESS   Mr. Paulino is very pleasant 72 year old year old male, who presents today for further evaluation recommendations regarding erectile dysfunction.  He has been given 100 mg of sildenafil to try.  He has taken up to the maximum dosage of 100 mg.  He notes that it is harder and harder to get an erection.  He is also wondering about a psychological component.  He also feels like his libido has decreased.  Patient is on Avodart 0.5 mg.  He notes that he is really on this for cancer prevention.  He had 5 negative prostate biopsies and was in a study at [facility].  He does have a family history of prostate cancer in his maternal grandfather and 2 maternal uncles.    Patient does have some urgency, but denies dysuria, or hematuria.  He typically has nocturia x2.    Was diagnosed with diabetes years ago.  Most recent hemoglobin A1c is 6.0%.  Lipids are within normal limits.  Most recent PSA is 0.66 on Avodart.  Also previous chart notes indicate some coronary artery disease and is on a SNRI in the form of Effexor.    The following portions of the patient's history were reviewed and updated as appropriate: allergies, current medications, past family history, past medical history, past social history, past surgical history and problem list.     REVIEW OF SYSTEMS   Review of Systems   Constitutional: Negative for chills and fever.   Respiratory: Negative for shortness of breath.    Cardiovascular: Negative for chest pain.   Gastrointestinal: Positive for nausea (Recent due to food poisoning.) and vomiting  (Recent due to food poisoning.).   Genitourinary: Positive for urgency. Negative for dysuria and hematuria.      Per HPI.     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)    Dyshidrosis    Primary dysthymia    Golfer's elbow    History of anemia    History of colectomy    History of colonic polyps    Astigmatism    Impotence of organic origin    Injury of olfactory nerve    Internal hemorrhoids    Internal hemorrhoids without complication    Iron deficiency    Mental disorder    Neck pain    Neuropathy    Obstructive sleep apnea (adult) (pediatric)    Arthritis    Osteopenia    Palpitations    Polyp of large intestine    Presbyopia    Proctalgia fugax    Hyperlipidemia    Seasonal allergies    Seizure disorder (H)    Arthropathy of cervical facet joint    Lupus erythematosus    Type 2 diabetes mellitus without complications (H)      Past Medical History:   Diagnosis Date    DMII (diabetes mellitus, type 2) (H)     Mumps     Prostate infection     STD (sexually transmitted disease)     Swelling of testicle       Past Surgical History:   Procedure Laterality Date    COLONOSCOPY  01/27/2009    COLONOSCOPY  11/21/2017    Negative for microscopic colitis    CT CALCIUM SCREENING  07/02/2018    Total Agatston calcium score is 393    HEMICOLECTOMY, RT/LT  06/26/2014    MRI BIOPSY PROSTATE      2008    REPAIR TENDON ANKLE  02/13/2002    WISDOM TOOTH EXTRACTION      1971      Social History:   Never  "smoker.  Uses chew.    Family History:   Family history of prostate cancer in 2 maternal uncles and maternal grandfather.    Objective      PHYSICAL EXAM   /67   Pulse 65   Ht 1.727 m (5' 8\")   Wt 70.8 kg (156 lb)   BMI 23.72 kg/m     Physical Exam  HENT:      Head: Normocephalic.      Nose: Nose normal.   Eyes:      General: No scleral icterus.  Pulmonary:      Effort: Pulmonary effort is normal.   Abdominal:      General: There is no distension.   Musculoskeletal:         General: Normal range of motion.      Cervical back: Normal range of motion.   Skin:     General: Skin is warm and dry.   Neurological:      General: No focal deficit present.      Mental Status: He is alert and oriented to person, place, and time.   Psychiatric:         Mood and Affect: Mood normal.         Behavior: Behavior normal.        LABORATORY     Hemoglobin A1c 6.0%.  Cholesterol, total 94.  HDL 53.  PSA 0.66 on Avodart 0.5 mg.    Assessment & Plan    1. Erectile dysfunction, unspecified erectile dysfunction type    2. Family history of prostate cancer    3. Decreased libido    4. Benign prostatic hyperplasia, unspecified whether lower urinary tract symptoms present      I had the pleasure today of meeting with Mr. Paulino to discuss erectile dysfunction and decreased libido.  We did discuss the natural history of erectile dysfunction and to have some degree erectile dysfunction at his current age but actually very normal.  We discussed risk factors for this such as diabetes, age, cholesterol, or neurological conditions.    We discussed treatment options for erectile dysfunction.    1. PDE5I - Discussed that if a patient doesn't respond adequately to Viagra, he would be unlikely to respond to Cialis or Levitra. The indication to switch to a different PDE5I would be if he was having bothersome side effects from the initial PDE5I.  2. MUSE (intraurethral alprostadil suppository) - Would perform the first teaching session in " "clinic. It is only effective in 30-40% of patients, and is not as effective as intracavernosal alprostadil. Most common side effects include dysuria or pain with insertion of the suppository.   3. ICI (intracavernosal injection of alprostadil) - Typically most effective of the medical treatment options. At 1 year, 60-70% of patient are still using ICI. Reasons to discontinue are often if it is ineffective, unreliable, and painful. Patient are asked to rotate site of injection to prevent scar tissue from building up at one site that can cause curvature of the penis (Peyronie's disease). If the patient is on anticoagulation, he would be at increased risk for hematoma formation at the site of injection.   4. PANCHITO (vacuum erection device) - Typically the least natural and most tedious of all the options listed above, but is a great way to get blood flow into the penis and promote penile \"physical therapy\" to preserve penile length. It can be used in combination with any of the medical therapies described above. This is typically used in conjunction with a constriction ring.  5. Penile implant - The most effective of all the options listed above and is guaranteed to get the patient an erection sufficient for penetration. It has the highest satisfaction rates of all the treatment listed above (over 95%). It is a surgery that requires general anesthesia and carries a 1-2% risk of infection, and 20% risk of mechanical failure at 10 years.     We did discuss that Avodart can possibly cause difficulties with libido, but patient may also have issues with low testosterone.  His PSA remains very low.  He is slightly more inclined to trial less invasive treatment.      We will plan on the following:    -Plan on trial of Cialis 20 mg for erectile dysfunction.  Discussed possible side effects.  Showed patient how to use good Rx to make this cheaper.    -Have recommended he stop sildenafil.    -Plan on obtaining a vacuum erection " device and using flexible penile constriction ring to maintain erections longer.    -We will have patient trial stopping Avodart to see if this will help his libido.    -Will trial stopping Avodart to see if this will help your libido.      -We will plan on testosterone testing to see if this is low.  If so, would recommend referral to endocrinology for supplementation.    -Patient will contact us if he would like to have a nurse visit to trial penile injections.  He will also contact us if he would like referral to Dr. Rodgers for possible penile prosthesis.    If concern in the future for psychogenic component, can consider referral for possible sex therapy.    Discussed typical prostate cancer screening guidelines.  Patient can continue with PSA given family history.  Would expect that his PSA will increase slightly after discontinuing Avodart.    Signed by:     Bety Velasquez PA-C 7/5/2023 1:54 PM

## 2023-07-05 NOTE — PATIENT INSTRUCTIONS
Will plan on trialing Cialis 20 mg for ED. Possible side effects include headache, flushing, blurred vision, backache, and priapism (an erection last greater than 4 hours).  Medication should be taken an hour prior to anticipated sexual activity.  This medication must not be taken with any medications such as nitroglycerin that he used for chest tightness. Do not exceed 20 mg in a day.    Stop sildenafil.    Can get a vacuum erection device and use flexible penile constriction ring.    Will trial stopping Avodart to see if this will help your libido.      Will plan on testing testosterone to see if this is low.  Can do at [facility].    If you would like to trial penile injections for ED, contact us for a nurse teaching session.    Contact us in the interim with questions, concerns, or changes in symptomatology.  [phone]

## 2023-07-07 ENCOUNTER — OFFICE VISIT (OUTPATIENT)
Dept: FAMILY MEDICINE | Facility: CLINIC | Age: 73
End: 2023-07-07
Payer: MEDICARE

## 2023-07-07 VITALS
DIASTOLIC BLOOD PRESSURE: 76 MMHG | SYSTOLIC BLOOD PRESSURE: 127 MMHG | HEIGHT: 68 IN | TEMPERATURE: 97.5 F | BODY MASS INDEX: 25.16 KG/M2 | WEIGHT: 166 LBS | HEART RATE: 72 BPM | RESPIRATION RATE: 20 BRPM | OXYGEN SATURATION: 100 %

## 2023-07-07 DIAGNOSIS — N52.9 ERECTILE DYSFUNCTION, UNSPECIFIED ERECTILE DYSFUNCTION TYPE: ICD-10-CM

## 2023-07-07 DIAGNOSIS — R68.82 DECREASED LIBIDO: ICD-10-CM
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
## 2023-07-07 DIAGNOSIS — S80.862D TICK BITE OF LEFT LOWER LEG, SUBSEQUENT ENCOUNTER: Primary | ICD-10-CM

## 2023-07-07 PROCEDURE — 86666 EHRLICHIA ANTIBODY: CPT | Mod: 90 | Performed by: PHYSICIAN ASSISTANT

## 2023-07-07 PROCEDURE — 84270 ASSAY OF SEX HORMONE GLOBUL: CPT | Performed by: PHYSICIAN ASSISTANT

## 2023-07-07 PROCEDURE — 99000 SPECIMEN HANDLING OFFICE-LAB: CPT | Performed by: PHYSICIAN ASSISTANT

## 2023-07-07 PROCEDURE — 84403 ASSAY OF TOTAL TESTOSTERONE: CPT | Performed by: PHYSICIAN ASSISTANT

## 2023-07-07 PROCEDURE — 36415 COLL VENOUS BLD VENIPUNCTURE: CPT | Performed by: PHYSICIAN ASSISTANT

## 2023-07-07 PROCEDURE — 99213 OFFICE O/P EST LOW 20 MIN: CPT | Performed by: PHYSICIAN ASSISTANT

## 2023-07-07 PROCEDURE — 86618 LYME DISEASE ANTIBODY: CPT | Performed by: PHYSICIAN ASSISTANT

## 2023-07-07 RX ORDER — MELOXICAM 15 MG/1
TABLET ORAL
COMMUNITY
Start: 2023-07-05 | End: 2024-03-18 | Stop reason: ALTCHOICE

## 2023-07-07 RX ORDER — DOXYCYCLINE 100 MG/1
100 CAPSULE ORAL 2 TIMES DAILY
COMMUNITY
Start: 2023-06-22 | End: 2024-03-18

## 2023-07-07 ASSESSMENT — ENCOUNTER SYMPTOMS
MUSCULOSKELETAL NEGATIVE: 1
CONSTITUTIONAL NEGATIVE: 1
CARDIOVASCULAR NEGATIVE: 1
RESPIRATORY NEGATIVE: 1
GASTROINTESTINAL NEGATIVE: 1

## 2023-07-07 NOTE — PROGRESS NOTES
"  1. Tick bite of left lower leg, subsequent encounter  He was treated with doxycycline for 10 days, he has no other sxs at this time but will check blood for tick borne illnesses  - Lyme Disease Total Abs Bld with Reflex to Confirm CLIA; Future  - Ehrlichia chaffeenis Abys IgG and IgM; Future  - Anaplasma phagocytoph Antibodies IgG IgM; Future      Subjective   Nadeem is a 72 year old, presenting for the following health issues:  Tick Bite (Pt c/o tick bite on left lower leg from \"late June\" Pt states he finished course of doxycycline but would still like a lymes screen.)        3/31/2023     1:55 PM   Additional Questions   Roomed by CLJ LPN   Accompanied by -     HPI           He was in [place] 2 weeks ago and found an embedded tick on left lower leg and went to a clinic and was treated with doxycycline for 10 days    Did not develop a rash,   No fever or chills, no mylagias    He would like a test for Lyme disease        Review of Systems   Constitutional: Negative.    HENT: Negative.    Respiratory: Negative.    Cardiovascular: Negative.    Gastrointestinal: Negative.    Musculoskeletal: Negative.             Objective    /76 (BP Location: Right arm, Patient Position: Sitting, Cuff Size: Adult Regular)   Pulse 72   Temp 97.5  F (36.4  C) (Tympanic)   Resp 20   Ht 1.727 m (5' 8\")   Wt 75.3 kg (166 lb)   SpO2 100%   BMI 25.24 kg/m    Body mass index is 25.24 kg/m .  Physical Exam  Vitals reviewed.   Constitutional:       Appearance: Normal appearance.   Cardiovascular:      Rate and Rhythm: Normal rate and regular rhythm.      Pulses: Normal pulses.      Heart sounds: Normal heart sounds.   Pulmonary:      Effort: Pulmonary effort is normal.      Breath sounds: Normal breath sounds.   Skin:     Comments: Small red lesion (3 mm) on lateral lower left leg, no drainage, no surrounding erythema   Neurological:      Mental Status: He is alert.                            "

## 2023-07-08 LAB — SHBG SERPL-SCNC: 68 NMOL/L (ref 11–80)

## 2023-07-09 LAB
A PHAGOCYTOPH IGG TITR SER IF: NORMAL {TITER}
A PHAGOCYTOPH IGM TITR SER IF: NORMAL {TITER}

## 2023-07-10 LAB
B BURGDOR IGG+IGM SER QL: 0.24
E CHAFFEENSIS IGG TITR SER IF: ABNORMAL {TITER}
E CHAFFEENSIS IGM TITR SER IF: ABNORMAL {TITER}

## 2023-07-11 LAB
TESTOST FREE SERPL-MCNC: 6.72 NG/DL
TESTOST SERPL-MCNC: 528 NG/DL (ref 240–950)

## 2023-07-14 ENCOUNTER — OFFICE VISIT (OUTPATIENT)
Dept: FAMILY MEDICINE | Facility: CLINIC | Age: 73
End: 2023-07-14
Payer: MEDICARE

## 2023-07-14 VITALS
BODY MASS INDEX: 25.46 KG/M2 | TEMPERATURE: 97.4 F | HEART RATE: 77 BPM | RESPIRATION RATE: 16 BRPM | SYSTOLIC BLOOD PRESSURE: 122 MMHG | HEIGHT: 68 IN | OXYGEN SATURATION: 100 % | DIASTOLIC BLOOD PRESSURE: 73 MMHG | WEIGHT: 168 LBS
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
## 2023-07-14 DIAGNOSIS — S80.862D TICK BITE OF LEFT LOWER LEG, SUBSEQUENT ENCOUNTER: Primary | ICD-10-CM

## 2023-07-14 PROCEDURE — 99213 OFFICE O/P EST LOW 20 MIN: CPT

## 2023-07-14 PROCEDURE — 99000 SPECIMEN HANDLING OFFICE-LAB: CPT

## 2023-07-14 PROCEDURE — 36415 COLL VENOUS BLD VENIPUNCTURE: CPT

## 2023-07-14 PROCEDURE — 86666 EHRLICHIA ANTIBODY: CPT | Mod: 90

## 2023-07-14 RX ORDER — HYDROCORTISONE 25 MG/G
OINTMENT TOPICAL 2 TIMES DAILY
Qty: 30 G | Refills: 1 | Status: SHIPPED | OUTPATIENT
Start: 2023-07-14

## 2023-07-14 NOTE — PROGRESS NOTES
"  Assessment & Plan     Tick bite of left lower leg, subsequent encounter  Left lower leg, small red bump seen.  No signs of infection, no signs of embedded foreign object or tick head.  Tick bite was on 6-25 and discussed with patient that if tick head was present it has deteriorated now to the point that it would not be able to be seen should small bump be excised.  Patient was treated with doxycycline at time of tick bite.  Denies any fevers, muscle aches or other signs of ongoing Lyme disease.  He did have a test for ear like he also has that was indeterminant.  The IgM was within expected range but the IgG was indeterminate.  Patient would like to retest today and this test is ordered               BMI:   Estimated body mass index is 25.54 kg/m  as calculated from the following:    Height as of this encounter: 1.727 m (5' 8\").    Weight as of this encounter: 76.2 kg (168 lb).           BJORN Alcazar CNP [facility]    Viji Silver is a 72 year old, presenting for the following health issues:  Insect Bites (Deer tick left ankle 06/25)        7/14/2023     3:48 PM   Additional Questions   Roomed by KYLIE Zaman     History of Present Illness       Reason for visit:  Tick bite    He eats 2-3 servings of fruits and vegetables daily.He consumes 0 sweetened beverage(s) daily.He exercises with enough effort to increase his heart rate 30 to 60 minutes per day.  He exercises with enough effort to increase his heart rate 4 days per week.   He is taking medications regularly.       Acute Illness  None      Review of Systems   Constitutional, HEENT, cardiovascular, pulmonary, gi and gu systems are negative, except as otherwise noted.      Objective    /73 (BP Location: Right arm, Patient Position: Sitting, Cuff Size: Adult Regular)   Pulse 77   Temp 97.4  F (36.3  C) (Tympanic)   Resp 16   Ht 1.727 m (5' 8\")   Wt 76.2 kg (168 lb)   SpO2 100%   BMI 25.54 kg/m  "   Body mass index is 25.54 kg/m .  Physical Exam   GENERAL: healthy, alert and no distress  NECK: no adenopathy, no asymmetry, masses, or scars and thyroid normal to palpation  RESP: lungs clear to auscultation - no rales, rhonchi or wheezes  CV: regular rate and rhythm, normal S1 S2, no S3 or S4, no murmur, click or rub, no peripheral edema and peripheral pulses strong  ABDOMEN: soft, nontender, no hepatosplenomegaly, no masses and bowel sounds normal  MS: no gross musculoskeletal defects noted, no edema

## 2023-07-14 NOTE — LETTER
July 21, 2023      Nadeem Paulino  [address] [place] [address]        Dear ,    We are writing to inform you of your test results.  Test for ehrlichiosis is within expected range and negative.    Resulted Orders   Ehrlichia chaffeenis Abys IgG and IgM   Result Value Ref Range    Ehrlichia BEN IgG <1:64 <1:64      Comment:      INTERPRETIVE INFORMATION: Ehrlichia Chaffeensis IgG Ab      Less than 1:64 ....... Negative: No significant level of                           Ehrlichia chaffeensis IgG antibody                           detected.    1:64-1:128 ........... Equivocal: Questionable presence                           of Ehrlichia chaffeensis IgG                           antibody detected. Repeat testing                           in 10-14 days may be helpful.    1:256 or greater ..... Positive: Presence of IgG antibody                           to Ehrlichia chaffeensis detected,                           suggestive of current or past                           infection.    Seroconversion between acute and convalescent sera is   considered strong evidence of recent infection. The best   evidence for infection is a significant change (fourfold   difference in titer) on two appropriately timed specimens,   where both tests are done in the same laboratory at the   same time.    This test was developed and its  performance characteristics   determined by [facility]. It has not been cleared or   approved by the US Food and Drug Administration. This test   was performed in a CLIA certified laboratory and is   intended for clinical purposes.    Ehrlichia BEN IgM < 1:16 <1:16      Comment:      INTERPRETIVE INFORMATION: Ehrlichia Chaffeensis IgM Ab      Less than 1:16 ....... Negative - No significant level                           of Ehrlichia chaffeensis IgM                           antibody detected.    1:16 or greater ...... Positive - Presence of IgM                           antibody to  Ehrlichia                           chaffeensis detected,                           suggestive of current or                           recent infection.    While the presence of IgM antibodies suggest current or   recent infection, low levels of IgM antibodies may   occasionally persist for more than 12 months   post-infection. A single IgM result should be interpreted   with caution.    This test was developed and its performance characteristics   determined by [facility]. It has not been cleared or   approved by the US Food and Drug Administration. This test   was performed in a CLIA certified laboratory and is   intended for clinical purposes.  Performed By: [facility]  [address] [place] [id number]  : Behzad Luan MD, PhD       If you have any questions or concerns, please call the clinic at the number listed above.       Sincerely,        BJORN Alcazar CNP

## 2023-07-17 ENCOUNTER — OFFICE VISIT (OUTPATIENT)
Dept: FAMILY MEDICINE | Facility: CLINIC | Age: 73
End: 2023-07-17
Payer: MEDICARE

## 2023-07-17 VITALS
DIASTOLIC BLOOD PRESSURE: 62 MMHG | HEART RATE: 68 BPM | TEMPERATURE: 98.1 F | SYSTOLIC BLOOD PRESSURE: 122 MMHG | RESPIRATION RATE: 16 BRPM | OXYGEN SATURATION: 98 % | HEIGHT: 68 IN | WEIGHT: 168 LBS | BODY MASS INDEX: 25.46 KG/M2

## 2023-07-17 DIAGNOSIS — E11.69 DISORDER OF NERVOUS SYSTEM DUE TO TYPE 2 DIABETES MELLITUS (H): ICD-10-CM

## 2023-07-17 DIAGNOSIS — G98.8 DISORDER OF NERVOUS SYSTEM DUE TO TYPE 2 DIABETES MELLITUS (H): ICD-10-CM

## 2023-07-17 DIAGNOSIS — E11.9 TYPE 2 DIABETES MELLITUS WITHOUT COMPLICATION, WITHOUT LONG-TERM CURRENT USE OF INSULIN (H): Primary | ICD-10-CM

## 2023-07-17 PROCEDURE — 99213 OFFICE O/P EST LOW 20 MIN: CPT | Performed by: PHYSICIAN ASSISTANT

## 2023-07-17 NOTE — PROGRESS NOTES
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    Weight as of this encounter: 76.2 kg (168 lb).           RILEY Reyes  [facility]    Viji Silver is a 72 year old, presenting for the following health issues:  Follow Up (Prostate Exam)        7/17/2023     9:34 AM   Additional Questions   Roomed by CLJ LPN   Accompanied by -     72-year-old presents to the clinic to discuss recent visit with urologist.  He has some questions in regards to continuing Avodart and he has erectile dysfunction we reviewed that information with him  His last PSA was 0.66 he states we need to double it because of the Avodart but still acceptable.    History of Present Illness       Reason for visit:  Tick bite    He eats 2-3 servings of fruits and vegetables daily.He consumes 0 sweetened beverage(s) daily.He exercises with enough effort to increase his heart rate 30 to 60 minutes per day.  He exercises with enough effort to increase his heart rate 4 days per week.   He is taking medications regularly.         Review of Systems   Genitourinary: Positive for impotence.            Objective    /62   Pulse 68   Temp 98.1  F (36.7  C)   Resp 16   Ht 1.727 m (5' 8\")   Wt 76.2 kg (168 lb)   SpO2 98%   BMI 25.54 kg/m    Body mass index is 25.54 kg/m .  Physical Exam alert attentive no acute distress  Respirations unlabored  Cardiovascular normal and rate                      "

## 2023-07-19 ENCOUNTER — TELEPHONE (OUTPATIENT)
Dept: FAMILY MEDICINE | Facility: CLINIC | Age: 73
End: 2023-07-19
Payer: MEDICARE

## 2023-07-19 LAB
E CHAFFEENSIS IGG TITR SER IF: NORMAL {TITER}
E CHAFFEENSIS IGM TITR SER IF: NORMAL {TITER}

## 2023-07-19 NOTE — TELEPHONE ENCOUNTER
Left him a message to call back.  This was an unidentified voicemail.  If he calls, I would state that the Wellbutrin has a risk of seizures as a possible side effect and I would rather not make that switch unless he is really strongly in favor of it.    KAH

## 2023-07-19 NOTE — TELEPHONE ENCOUNTER
New Medication Request    Contacts       Type Contact Phone/Fax    07/19/2023 08:14 AM CDT Phone (Incoming) Nadeem Paulino (Self) [phone] (H)          What medication are you requesting?: Wellbutrin    Reason for medication request: Prostate Specialist recommend pt to switch from Venlafaxine to Wellbutrin      Controlled Substance Agreement on file:   CSA -- Patient Level:    CSA: None found at the patient level.           Preferred Pharmacy:   [id number] [place], [place] - [address]  [address] [address]  Phone: [phone] Fax: [phone]      Okay to leave a detailed message?: Yes at Home number on file [phone] (home)

## 2023-07-19 NOTE — TELEPHONE ENCOUNTER
Pt called back and was informed of message from provider. Pt states he is going to research this and will call back after returning from trip with decision.

## 2023-08-17 ENCOUNTER — TELEPHONE (OUTPATIENT)
Dept: FAMILY MEDICINE | Facility: CLINIC | Age: 73
End: 2023-08-17
Payer: MEDICARE

## 2023-08-17 DIAGNOSIS — G89.29 OTHER CHRONIC PAIN: ICD-10-CM

## 2023-08-17 RX ORDER — OXYCODONE AND ACETAMINOPHEN 5; 325 MG/1; MG/1
1 TABLET ORAL EVERY 6 HOURS PRN
Qty: 30 TABLET | Refills: 0 | Status: CANCELLED | OUTPATIENT
Start: 2023-08-17

## 2023-08-17 NOTE — TELEPHONE ENCOUNTER
S-(situation): left knee injury    B-(background): pt is in [facility] for a target shooting competition. Pt stated he was hanging up a target while standing on the tailgate of his truck, when he slipped and fell to the ground.     A-(assessment): Pt stated during the fall, he twisted his knee. Having pain and swelling of the knee. Using a cane when walking. Pt has a couple of percocets that he will take for the pain, but is requesting a new prescription to be sent in.     R-(recommendations): Pt needs to be seen for evaluation of the injury. Pt refused; [place]. Pt requesting a refill of percocet be sent to a pharmacy in [place]. Pt stated he will fax the pharmacy information to the clinic. Writer encouraged to have knee injury evaluated. Discussed using OTC pain medications, ice, rest, elevation, compression.  Pt requested an appointment with PCP next week when he is back in town; scheduled as requested.

## 2023-08-17 NOTE — TELEPHONE ENCOUNTER
Fax received from patient with same information as above. Fax placed on Meek's desk for review.    Refill of percocet and pharmacy pended if appropriate.    Beverly Hull CMA ............... 5:20 PM, 08/17/23

## 2023-08-18 DIAGNOSIS — G89.29 OTHER CHRONIC PAIN: ICD-10-CM

## 2023-08-18 RX ORDER — OXYCODONE AND ACETAMINOPHEN 5; 325 MG/1; MG/1
1 TABLET ORAL EVERY 6 HOURS PRN
Qty: 30 TABLET | Refills: 0 | Status: SHIPPED | OUTPATIENT
Start: 2023-08-18 | End: 2024-06-26

## 2023-08-18 RX ORDER — OXYCODONE AND ACETAMINOPHEN 5; 325 MG/1; MG/1
1 TABLET ORAL EVERY 6 HOURS PRN
Qty: 30 TABLET | Refills: 0 | Status: SHIPPED | OUTPATIENT
Start: 2023-08-18 | End: 2023-08-18

## 2023-08-23 ENCOUNTER — OFFICE VISIT (OUTPATIENT)
Dept: FAMILY MEDICINE | Facility: CLINIC | Age: 73
End: 2023-08-23
Payer: MEDICARE

## 2023-08-23 VITALS
HEART RATE: 70 BPM | BODY MASS INDEX: 25.46 KG/M2 | RESPIRATION RATE: 18 BRPM | WEIGHT: 168 LBS | OXYGEN SATURATION: 98 % | SYSTOLIC BLOOD PRESSURE: 112 MMHG | DIASTOLIC BLOOD PRESSURE: 56 MMHG | HEIGHT: 68 IN | TEMPERATURE: 98 F

## 2023-08-23 DIAGNOSIS — E11.69 DISORDER OF NERVOUS SYSTEM DUE TO TYPE 2 DIABETES MELLITUS (H): Primary | ICD-10-CM

## 2023-08-23 DIAGNOSIS — S86.912A KNEE STRAIN, LEFT, INITIAL ENCOUNTER: ICD-10-CM

## 2023-08-23 DIAGNOSIS — G98.8 DISORDER OF NERVOUS SYSTEM DUE TO TYPE 2 DIABETES MELLITUS (H): Primary | ICD-10-CM

## 2023-08-23 LAB — HBA1C MFR BLD: 6.3 % (ref 0–5.6)

## 2023-08-23 PROCEDURE — 36415 COLL VENOUS BLD VENIPUNCTURE: CPT | Performed by: PHYSICIAN ASSISTANT

## 2023-08-23 PROCEDURE — 99213 OFFICE O/P EST LOW 20 MIN: CPT | Performed by: PHYSICIAN ASSISTANT

## 2023-08-23 PROCEDURE — 83036 HEMOGLOBIN GLYCOSYLATED A1C: CPT | Performed by: PHYSICIAN ASSISTANT

## 2023-08-23 ASSESSMENT — ENCOUNTER SYMPTOMS
JOINT SWELLING: 0
ARTHRALGIAS: 1
ACTIVITY CHANGE: 1

## 2023-08-23 NOTE — LETTER
August 25, 2023      Nadeem JENAE Paulino  [address] [address]        Dear ,    We are writing to inform you of your test results.    This still shows excellent control.    Resulted Orders   HEMOGLOBIN A1C   Result Value Ref Range    Hemoglobin A1C 6.3 (H) 0.0 - 5.6 %      Comment:      Normal <5.7%   Prediabetes 5.7-6.4%    Diabetes 6.5% or higher     Note: Adopted from ADA consensus guidelines.       If you have any questions or concerns, please call the clinic at the number listed above.       Sincerely,      RILEY Villareal

## 2023-08-23 NOTE — PROGRESS NOTES
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    Weight as of this encounter: 76.2 kg (168 lb).           RILEY Reyes  [facility]    Viji Silver is a 72 year old, presenting for the following health issues:  Knee Problem      To discuss his left knee he injured it while falling off his tailgate of the pickup while [place] [employer] contest  This was last week  He is doing better  He did not get notified that we had sent in medication for him  He took Aleve  There was no swelling there is no instability he has chronic knee problems    History of Present Illness       Reason for visit:  Knee  Symptom onset:  3-7 days ago  Symptoms include:  Knee pain    He eats 2-3 servings of fruits and vegetables daily.He consumes 0 sweetened beverage(s) daily.He exercises with enough effort to increase his heart rate 20 to 29 minutes per day.    He is taking medications regularly.         Review of Systems   Constitutional:  Positive for activity change.   Musculoskeletal:  Positive for arthralgias and gait problem. Negative for joint swelling.   Skin: Negative.             Objective    /56   Pulse 70   Temp 98  F (36.7  C)   Resp 18   Ht 1.727 m (5' 8\")   Wt 76.2 kg (168 lb)   SpO2 98%   BMI 25.54 kg/m    Body mass index is 25.54 kg/m .  Physical Exam attentive no acute distress there is no knee effusion there is no erythema there is no laxity with varus or valgus strain drawer sign is negative no joint line tenderness  He has some tenderness in the lateral " hamstring

## 2023-08-23 NOTE — LETTER
August 30, 2023      Nadeem Paulino  [address] [facility] [facility] [address]        Dear ,    We are writing to inform you of your test results.    Your urine microalbumin is normal.  Your A1c is actually very good at 6.3 let me know if you need anything else.    Resulted Orders   Albumin Random Urine Quantitative with Creat Ratio   Result Value Ref Range    Creatinine Urine mg/dL 21.4 mg/dL      Comment:      The reference ranges have not been established in urine creatinine. The results should be integrated into the clinical context for interpretation.    Albumin Urine mg/L <12.0 mg/L      Comment:      The reference ranges have not been established in urine albumin. The results should be integrated into the clinical context for interpretation.    Albumin Urine mg/g Cr        Comment:      Unable to calculate, urine albumin and/or urine creatinine is outside detectable limits.  Microalbuminuria is defined as an albumin:creatinine ratio of 17 to 299 for males and 25 to 299 for females. A ratio of albumin:creatinine of 300 or higher is indicative of overt proteinuria.  Due to biologic variability, positive results should be confirmed by a second, first-morning random or 24-hour timed urine specimen. If there is discrepancy, a third specimen is recommended. When 2 out of 3 results are in the microalbuminuria range, this is evidence for incipient nephropathy and warrants increased efforts at glucose control, blood pressure control, and institution of therapy with an angiotensin-converting-enzyme (ACE) inhibitor (if the patient can tolerate it).     HEMOGLOBIN A1C   Result Value Ref Range    Hemoglobin A1C 6.3 (H) 0.0 - 5.6 %      Comment:      Normal <5.7%   Prediabetes 5.7-6.4%    Diabetes 6.5% or higher     Note: Adopted from ADA consensus guidelines.       If you have any questions or concerns, please call the clinic at the number listed above.       Sincerely,      Meek Serna,  PA

## 2023-08-24 ENCOUNTER — NURSE TRIAGE (OUTPATIENT)
Dept: NURSING | Facility: CLINIC | Age: 73
End: 2023-08-24
Payer: MEDICARE

## 2023-08-24 NOTE — TELEPHONE ENCOUNTER
Received call from patient stating that he received a call from the clinic today asking him to call them back. In reviewing the chart, did not find any documentation of such call. Patient stated he saw his PCP at [facility] yesterday. Attempted to warm-transfer patient to [facility] TC priority line, but he hung up before calls could be connected.    Reason for Disposition   Nursing judgment    Protocols used: Information Only Call - No Triage-A-OH

## 2023-08-29 ENCOUNTER — APPOINTMENT (OUTPATIENT)
Dept: LAB | Facility: CLINIC | Age: 73
End: 2023-08-29
Payer: MEDICARE

## 2023-08-29 LAB
CREAT UR-MCNC: 21.4 MG/DL
MICROALBUMIN UR-MCNC: <12 MG/L
MICROALBUMIN/CREAT UR: NORMAL MG/G{CREAT}

## 2023-08-29 PROCEDURE — 82570 ASSAY OF URINE CREATININE: CPT | Performed by: PHYSICIAN ASSISTANT

## 2023-08-29 PROCEDURE — 82043 UR ALBUMIN QUANTITATIVE: CPT | Performed by: PHYSICIAN ASSISTANT

## 2023-08-30 DIAGNOSIS — N52.9 ERECTILE DYSFUNCTION, UNSPECIFIED ERECTILE DYSFUNCTION TYPE: Primary | ICD-10-CM

## 2023-08-30 RX ORDER — SILDENAFIL 100 MG/1
100 TABLET, FILM COATED ORAL DAILY PRN
Qty: 10 TABLET | Refills: 0 | Status: SHIPPED | OUTPATIENT
Start: 2023-08-30 | End: 2024-03-18 | Stop reason: ALTCHOICE

## 2023-08-30 NOTE — TELEPHONE ENCOUNTER
Routing refill request to provider for review/approval because:  Drug not active on patient's medication list    7/5/23 urology note:

## 2023-09-30 DIAGNOSIS — R19.7 DIARRHEA, UNSPECIFIED TYPE: ICD-10-CM

## 2023-09-30 DIAGNOSIS — E11.9 TYPE 2 DIABETES MELLITUS WITHOUT COMPLICATION, WITHOUT LONG-TERM CURRENT USE OF INSULIN (H): ICD-10-CM

## 2023-10-01 RX ORDER — BROMOCRIPTINE MESYLATE 0.8 MG/1
TABLET ORAL
Qty: 270 TABLET | Refills: 0 | Status: SHIPPED | OUTPATIENT
Start: 2023-10-01 | End: 2023-10-19

## 2023-10-01 RX ORDER — DIPHENOXYLATE HCL/ATROPINE 2.5-.025MG
1 TABLET ORAL 4 TIMES DAILY PRN
Qty: 95 TABLET | Refills: 5 | Status: SHIPPED | OUTPATIENT
Start: 2023-10-01 | End: 2024-03-29

## 2023-10-19 DIAGNOSIS — E11.9 TYPE 2 DIABETES MELLITUS WITHOUT COMPLICATION, WITHOUT LONG-TERM CURRENT USE OF INSULIN (H): ICD-10-CM

## 2023-10-19 RX ORDER — BROMOCRIPTINE MESYLATE 0.8 MG/1
TABLET ORAL
Qty: 270 TABLET | Refills: 3 | Status: SHIPPED | OUTPATIENT
Start: 2023-10-19 | End: 2024-03-15

## 2023-12-01 ENCOUNTER — PRE VISIT (OUTPATIENT)
Dept: UROLOGY | Facility: CLINIC | Age: 73
End: 2023-12-01
Payer: MEDICARE

## 2023-12-01 NOTE — CONFIDENTIAL NOTE
Reason for visit: discuss possible penile prothesis     Relevant information: erectile dysfunction    Records/imaging/labs/orders: in epic    At Rooming: damon Carrillo  12/1/2023  1:38 PM

## 2023-12-14 ENCOUNTER — OFFICE VISIT (OUTPATIENT)
Dept: UROLOGY | Facility: CLINIC | Age: 73
End: 2023-12-14
Attending: UROLOGY
Payer: MEDICARE

## 2023-12-14 VITALS
HEIGHT: 68 IN | OXYGEN SATURATION: 99 % | BODY MASS INDEX: 23.49 KG/M2 | HEART RATE: 75 BPM | SYSTOLIC BLOOD PRESSURE: 119 MMHG | WEIGHT: 155 LBS | DIASTOLIC BLOOD PRESSURE: 69 MMHG

## 2023-12-14 DIAGNOSIS — N52.8 OTHER MALE ERECTILE DYSFUNCTION: Primary | ICD-10-CM

## 2023-12-14 PROCEDURE — 99214 OFFICE O/P EST MOD 30 MIN: CPT | Performed by: UROLOGY

## 2023-12-14 ASSESSMENT — PAIN SCALES - GENERAL: PAINLEVEL: MILD PAIN (2)

## 2023-12-14 NOTE — NURSING NOTE
"Chief Complaint   Patient presents with    Consult     Penile prosthesis consult       Blood pressure 119/69, pulse 75, height 1.727 m (5' 8\"), weight 70.3 kg (155 lb), SpO2 99%. Body mass index is 23.57 kg/m .    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)    Dyshidrosis    Primary dysthymia    Golfer's elbow    History of anemia    History of colectomy    History of colonic polyps    Astigmatism    Impotence of organic origin    Injury of olfactory nerve    Internal hemorrhoids    Internal hemorrhoids without complication    Iron deficiency    Mental disorder    Neck pain    Neuropathy    Obstructive sleep apnea (adult) (pediatric)    Arthritis    Osteopenia    Palpitations    Polyp of large intestine    Presbyopia    Proctalgia fugax    Hyperlipidemia    Seasonal allergies    Seizure disorder (H)    Arthropathy of cervical facet joint    Lupus erythematosus    Type 2 diabetes mellitus without complications (H)       No Known Allergies    Current Outpatient Medications   Medication Sig Dispense Refill    ANUCORT-HC 25 MG suppository       aspirin (ASA) 81 MG EC tablet 81 mg daily      biotin 300 MCG TABS tablet Take 300 mcg by mouth daily      CYCLOSET 0.8 MG TABS tablet TAKE THREE TABLETS (2.4 MG) BY MOUTH EVERY MORNING 270 tablet 3    diphenoxylate-atropine (LOMOTIL) 2.5-0.025 MG tablet TAKE 1 TABLET BY MOUTH FOUR TIMES DAILY AS NEEDED FOR DIARRHEA 95 tablet 5    doxycycline hyclate " (VIBRAMYCIN) 100 MG capsule Take 100 mg by mouth 2 times daily      dutasteride (AVODART) 0.5 MG capsule TAKE ONE CAPSULE (0.5 MG) BY MOUTH DAILY 95 capsule 3    glimepiride (AMARYL) 2 MG tablet Take 1 tablet (2 mg) by mouth every morning (before breakfast) 95 tablet 3    hydrocortisone 2.5 % ointment Apply topically 2 times daily 30 g 1    ibuprofen (ADVIL/MOTRIN) 600 MG tablet Take 600 mg by mouth as needed      lidocaine (XYLOCAINE) 5 % external ointment Apply topically 3 times daily as needed for moderate pain (anal pruritis) 15 g 3    meloxicam (MOBIC) 15 MG tablet       metFORMIN (GLUCOPHAGE XR) 500 MG 24 hr tablet Take 4 tablets (2,000 mg) by mouth daily 370 tablet 3    modafinil (PROVIGIL) 200 MG tablet Take 1 tablet (200 mg) by mouth every morning 30 tablet 5    oxyCODONE-acetaminophen (PERCOCET) 5-325 MG tablet Take 1 tablet by mouth every 6 hours as needed for pain or severe pain 30 tablet 0    rosuvastatin (CRESTOR) 5 MG tablet Take 1 tablet (5 mg) by mouth daily 90 tablet 3    sildenafil (VIAGRA) 100 MG tablet Take 1 tablet (100 mg) by mouth daily as needed (for sexual intercourse) 10 tablet 0    tadalafil (ADCIRCA/CIALIS) 20 MG tablet Take 1 tablet (20 mg) by mouth daily as needed (prior to sexual activity) 30 tablet 1    tiZANidine (ZANAFLEX) 4 MG tablet   See Instructions, Instructions: 0.5-2 tab(s) Oral q8 hrs as needed, # 30 tab(s), 1 Refill(s), Type: Maintenance, Pharmacy: [facility] #[id number], 0.5-2 tab(s) Oral q8 hrs as needed, 159, lb, 05/08/23 14:41:00 CDT, Weight Measured      venlafaxine (EFFEXOR XR) 75 MG 24 hr capsule Take 1 capsule (75 mg) by mouth daily 95 capsule 3       Social History     Tobacco Use    Smoking status: Never    Smokeless tobacco: Current     Types: Chew   Vaping Use    Vaping Use: Never used       Sultana García LPN  12/14/2023  2:59 PM

## 2023-12-14 NOTE — PROGRESS NOTES
I am seeing Nadeem Paulino in consultation for evaluation of erectile dysfunction.    HPI:  Nadeem Paulino is a 73 year old male is a very nice man with complaints of erectile dysfunction.    PDE5i's are not working well.  PANCHITO not satisfactory.  He is interested in next steps for ED management.    He does have a family history of prostate cancer in his maternal grandfather and 2 maternal uncles.     ED/Vascular disease risk factors:  HTN:   No  Hyperlipidemia: no  Smoking: chews-    DM: yes, on Metformin.  Cardiovascular disease: yes, he has history of coronary artery disease.  Meds associated with ED that he's taking: SSRI,   Anxiety/anger/depression: treated, yes.  Penile Plaques or curvature:  none.     PAST MEDICAL HX:  Past Medical History:   Diagnosis Date     DMII (diabetes mellitus, type 2) (H)      Mumps      Prostate infection      STD (sexually transmitted disease)      Swelling of testicle        PAST SURG HX:  Past Surgical History:   Procedure Laterality Date     COLONOSCOPY  01/27/2009     COLONOSCOPY  11/21/2017    Negative for microscopic colitis     CT CALCIUM SCREENING  07/02/2018    Total Agatston calcium score is 393     HEMICOLECTOMY, RT/LT  06/26/2014     MRI BIOPSY PROSTATE      2008     REPAIR TENDON ANKLE  02/13/2002     WISDOM TOOTH EXTRACTION      1971        FAMILY HX:  Family History   Problem Relation Age of Onset     Heart Disease Mother      Cerebrovascular Disease Mother      Heart Surgery Father        SOCIAL HX:  Social History     Tobacco Use     Smoking status: Never     Smokeless tobacco: Current     Types: Chew   Vaping Use     Vaping Use: Never used       MEDICATIONS:  Current Outpatient Medications   Medication Sig     ANUCORT-HC 25 MG suppository      aspirin (ASA) 81 MG EC tablet 81 mg daily     biotin 300 MCG TABS tablet Take 300 mcg by mouth daily     CYCLOSET 0.8 MG TABS tablet TAKE THREE TABLETS (2.4 MG) BY MOUTH EVERY MORNING     diphenoxylate-atropine (LOMOTIL)  "2.5-0.025 MG tablet TAKE 1 TABLET BY MOUTH FOUR TIMES DAILY AS NEEDED FOR DIARRHEA     doxycycline hyclate (VIBRAMYCIN) 100 MG capsule Take 100 mg by mouth 2 times daily     dutasteride (AVODART) 0.5 MG capsule TAKE ONE CAPSULE (0.5 MG) BY MOUTH DAILY     glimepiride (AMARYL) 2 MG tablet Take 1 tablet (2 mg) by mouth every morning (before breakfast)     hydrocortisone 2.5 % ointment Apply topically 2 times daily     ibuprofen (ADVIL/MOTRIN) 600 MG tablet Take 600 mg by mouth as needed     lidocaine (XYLOCAINE) 5 % external ointment Apply topically 3 times daily as needed for moderate pain (anal pruritis)     meloxicam (MOBIC) 15 MG tablet      metFORMIN (GLUCOPHAGE XR) 500 MG 24 hr tablet Take 4 tablets (2,000 mg) by mouth daily     modafinil (PROVIGIL) 200 MG tablet Take 1 tablet (200 mg) by mouth every morning     oxyCODONE-acetaminophen (PERCOCET) 5-325 MG tablet Take 1 tablet by mouth every 6 hours as needed for pain or severe pain     rosuvastatin (CRESTOR) 5 MG tablet Take 1 tablet (5 mg) by mouth daily     sildenafil (VIAGRA) 100 MG tablet Take 1 tablet (100 mg) by mouth daily as needed (for sexual intercourse)     tadalafil (ADCIRCA/CIALIS) 20 MG tablet Take 1 tablet (20 mg) by mouth daily as needed (prior to sexual activity)     tiZANidine (ZANAFLEX) 4 MG tablet   See Instructions, Instructions: 0.5-2 tab(s) Oral q8 hrs as needed, # 30 tab(s), 1 Refill(s), Type: Maintenance, Pharmacy: [facility] #[id number], 0.5-2 tab(s) Oral q8 hrs as needed, 159, lb, 05/08/23 14:41:00 CDT, Weight Measured     venlafaxine (EFFEXOR XR) 75 MG 24 hr capsule Take 1 capsule (75 mg) by mouth daily     No current facility-administered medications for this visit.       ALLERGIES:  Patient has no known allergies.      GENERAL PHYSICAL EXAM:   /69 (BP Location: Right arm, Patient Position: Sitting, Cuff Size: Adult Regular)   Pulse 75   Ht 1.727 m (5' 8\")   Wt 70.3 kg (155 lb)   SpO2 99%   BMI 23.57 kg/m   "   Constitutional: No acute distress. Well nourished.   PSYCH: normal mood and affect.  NEURO: normal gait, no focal deficits.   EYES: anicteric, EOMI, PERR.  CARDIOPULMONARY: breathing non-labored, pulse regular rate/rhythm, no peripheral edema.  GI: Abdomen soft, non-tender, nondistended   MUSCULOSKELETAL: normal limb proportions, no muscle wasting, no contractures.  SKIN: Normal virilized hair distribution, no lesions, warts or rashes over genitalia, abdomen extremities or face.  HEME/LYMPH: no ecchymosis, no lymphadenopathy in groin, no lymphedema.    Imaging/labs:  Lab Results   Component Value Date    CR 0.83 03/27/2023    CR 0.75 09/02/2022    CR 0.87 02/02/2022     Lab Results   Component Value Date    PSA 0.66 04/05/2023    PSA 0.66 10/30/2021    PSA 0.4 06/09/2021    PSA 0.7 06/11/2020    PSA 0.7 02/18/2019    PSA 1.0 12/21/2017    PSA 1.5 05/10/2017    PSA 1.5 05/10/2017       Normal testosterone 7/7/23:          ASSESSMENT:     Erectile dysfunction- organic.    PLAN:    Schedule intracavernosal injections teaching with 20mcg Edex injection.      Copied cc to Consulting provider Bety Velasquez        Thank-you for the kind consultation.  Fabio Rodgers MD     Urological Surgeon       Additional Coding Information:    Problems:  3 -- one stable chronic illness    Data Reviewed  Normal testosterone level     Level of risk:  4 -- prescription drug management    Time spent:  17 minutes spent on the date of the encounter doing chart review, history and exam, documentation and further activities per the note

## 2023-12-14 NOTE — LETTER
12/14/2023       RE: Nadeem Paulino  [address] [facility] [address]     Dear Colleague,    Thank you for referring your patient, Nadeem Paulino, to the [facility]OLOG[facility] [place] at [facility]. Please see a copy of my visit note below.    I am seeing Nadeem Paulino in consultation for evaluation of erectile dysfunction.    HPI:  Nadeem Paulino is a 73 year old male is a very nice man with complaints of erectile dysfunction.    PDE5i's are not working well.  PANCHITO not satisfactory.  He is interested in next steps for ED management.    He does have a family history of prostate cancer in his maternal grandfather and 2 maternal uncles.     ED/Vascular disease risk factors:  HTN:   No  Hyperlipidemia: no  Smoking: chews-    DM: yes, on Metformin.  Cardiovascular disease: yes, he has history of coronary artery disease.  Meds associated with ED that he's taking: SSRI,   Anxiety/anger/depression: treated, yes.  Penile Plaques or curvature:  none.     PAST MEDICAL HX:  Past Medical History:   Diagnosis Date    DMII (diabetes mellitus, type 2) (H)     Mumps     Prostate infection     STD (sexually transmitted disease)     Swelling of testicle        PAST SURG HX:  Past Surgical History:   Procedure Laterality Date    COLONOSCOPY  01/27/2009    COLONOSCOPY  11/21/2017    Negative for microscopic colitis    CT CALCIUM SCREENING  07/02/2018    Total Agatston calcium score is 393    HEMICOLECTOMY, RT/LT  06/26/2014    MRI BIOPSY PROSTATE      2008    REPAIR TENDON ANKLE  02/13/2002    WISDOM TOOTH EXTRACTION      1971        FAMILY HX:  Family History   Problem Relation Age of Onset    Heart Disease Mother     Cerebrovascular Disease Mother     Heart Surgery Father        SOCIAL HX:  Social History     Tobacco Use    Smoking status: Never    Smokeless tobacco: Current     Types: Chew   Vaping Use    Vaping Use: Never used       MEDICATIONS:  Current  Outpatient Medications   Medication Sig    ANUCORT-HC 25 MG suppository     aspirin (ASA) 81 MG EC tablet 81 mg daily    biotin 300 MCG TABS tablet Take 300 mcg by mouth daily    CYCLOSET 0.8 MG TABS tablet TAKE THREE TABLETS (2.4 MG) BY MOUTH EVERY MORNING    diphenoxylate-atropine (LOMOTIL) 2.5-0.025 MG tablet TAKE 1 TABLET BY MOUTH FOUR TIMES DAILY AS NEEDED FOR DIARRHEA    doxycycline hyclate (VIBRAMYCIN) 100 MG capsule Take 100 mg by mouth 2 times daily    dutasteride (AVODART) 0.5 MG capsule TAKE ONE CAPSULE (0.5 MG) BY MOUTH DAILY    glimepiride (AMARYL) 2 MG tablet Take 1 tablet (2 mg) by mouth every morning (before breakfast)    hydrocortisone 2.5 % ointment Apply topically 2 times daily    ibuprofen (ADVIL/MOTRIN) 600 MG tablet Take 600 mg by mouth as needed    lidocaine (XYLOCAINE) 5 % external ointment Apply topically 3 times daily as needed for moderate pain (anal pruritis)    meloxicam (MOBIC) 15 MG tablet     metFORMIN (GLUCOPHAGE XR) 500 MG 24 hr tablet Take 4 tablets (2,000 mg) by mouth daily    modafinil (PROVIGIL) 200 MG tablet Take 1 tablet (200 mg) by mouth every morning    oxyCODONE-acetaminophen (PERCOCET) 5-325 MG tablet Take 1 tablet by mouth every 6 hours as needed for pain or severe pain    rosuvastatin (CRESTOR) 5 MG tablet Take 1 tablet (5 mg) by mouth daily    sildenafil (VIAGRA) 100 MG tablet Take 1 tablet (100 mg) by mouth daily as needed (for sexual intercourse)    tadalafil (ADCIRCA/CIALIS) 20 MG tablet Take 1 tablet (20 mg) by mouth daily as needed (prior to sexual activity)    tiZANidine (ZANAFLEX) 4 MG tablet   See Instructions, Instructions: 0.5-2 tab(s) Oral q8 hrs as needed, # 30 tab(s), 1 Refill(s), Type: Maintenance, Pharmacy: [facility] #[id number], 0.5-2 tab(s) Oral q8 hrs as needed, 159, lb, 05/08/23 14:41:00 CDT, Weight Measured    venlafaxine (EFFEXOR XR) 75 MG 24 hr capsule Take 1 capsule (75 mg) by mouth daily     No current facility-administered medications for  "this visit.       ALLERGIES:  Patient has no known allergies.      GENERAL PHYSICAL EXAM:   /69 (BP Location: Right arm, Patient Position: Sitting, Cuff Size: Adult Regular)   Pulse 75   Ht 1.727 m (5' 8\")   Wt 70.3 kg (155 lb)   SpO2 99%   BMI 23.57 kg/m     Constitutional: No acute distress. Well nourished.   PSYCH: normal mood and affect.  NEURO: normal gait, no focal deficits.   EYES: anicteric, EOMI, PERR.  CARDIOPULMONARY: breathing non-labored, pulse regular rate/rhythm, no peripheral edema.  GI: Abdomen soft, non-tender, nondistended   MUSCULOSKELETAL: normal limb proportions, no muscle wasting, no contractures.  SKIN: Normal virilized hair distribution, no lesions, warts or rashes over genitalia, abdomen extremities or face.  HEME/LYMPH: no ecchymosis, no lymphadenopathy in groin, no lymphedema.    Imaging/labs:  Lab Results   Component Value Date    CR 0.83 03/27/2023    CR 0.75 09/02/2022    CR 0.87 02/02/2022     Lab Results   Component Value Date    PSA 0.66 04/05/2023    PSA 0.66 10/30/2021    PSA 0.4 06/09/2021    PSA 0.7 06/11/2020    PSA 0.7 02/18/2019    PSA 1.0 12/21/2017    PSA 1.5 05/10/2017    PSA 1.5 05/10/2017       Normal testosterone 7/7/23:          ASSESSMENT:   Erectile dysfunction- organic.    PLAN:  Schedule intracavernosal injections teaching with 20mcg Edex injection.      Copied cc to Consulting provider Bety Velasquez        Thank-you for the kind consultation.  Fabio Rodgers MD     Urological Surgeon       Additional Coding Information:    Problems:  3 -- one stable chronic illness    Data Reviewed  Normal testosterone level     Level of risk:  4 -- prescription drug management    Time spent:  17 minutes spent on the date of the encounter doing chart review, history and exam, documentation and further activities per the note    "

## 2023-12-16 ENCOUNTER — TRANSFERRED RECORDS (OUTPATIENT)
Dept: MULTI SPECIALTY CLINIC | Facility: CLINIC | Age: 73
End: 2023-12-16

## 2023-12-16 LAB — RETINOPATHY: NORMAL

## 2024-01-03 ENCOUNTER — PATIENT OUTREACH (OUTPATIENT)
Dept: GASTROENTEROLOGY | Facility: CLINIC | Age: 74
End: 2024-01-03
Payer: MEDICARE

## 2024-01-05 ENCOUNTER — ALLIED HEALTH/NURSE VISIT (OUTPATIENT)
Dept: UROLOGY | Facility: CLINIC | Age: 74
End: 2024-01-05
Payer: MEDICARE

## 2024-01-05 DIAGNOSIS — N52.9 ERECTILE DYSFUNCTION, UNSPECIFIED ERECTILE DYSFUNCTION TYPE: Primary | ICD-10-CM

## 2024-01-05 PROCEDURE — 99207 PR NO BILLABLE SERVICE THIS VISIT: CPT | Mod: JZ | Performed by: UROLOGY

## 2024-01-05 PROCEDURE — 99207 PR ALPROSTADIL 1.25 MCG INJ: CPT | Mod: JZ

## 2024-01-05 PROCEDURE — 99211 OFF/OP EST MAY X REQ PHY/QHP: CPT

## 2024-01-06 NOTE — PROGRESS NOTES
Chief Complaint   Patient presents with    Clinic Care Coordination - Face To Face       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)    Dyshidrosis    Primary dysthymia    Golfer's elbow    History of anemia    History of colectomy    History of colonic polyps    Astigmatism    Impotence of organic origin    Injury of olfactory nerve    Internal hemorrhoids    Internal hemorrhoids without complication    Iron deficiency    Mental disorder    Neck pain    Neuropathy    Obstructive sleep apnea (adult) (pediatric)    Arthritis    Osteopenia    Palpitations    Polyp of large intestine    Presbyopia    Proctalgia fugax    Hyperlipidemia    Seasonal allergies    Seizure disorder (H)    Arthropathy of cervical facet joint    Lupus erythematosus    Type 2 diabetes mellitus without complications (H)       No Known Allergies    Current Outpatient Medications   Medication Sig Dispense Refill    alprostadil (EDEX) 20 MCG kit 20 mcg by Intracavitary route as needed for erectile dysfunction use no more than 3 times per week 2 kit 1    ANUCORT-HC 25 MG suppository       aspirin (ASA) 81 MG EC tablet 81 mg daily      biotin 300 MCG TABS tablet Take 300 mcg by mouth daily      CYCLOSET 0.8 MG TABS tablet TAKE THREE TABLETS (2.4 MG) BY MOUTH EVERY MORNING 270 tablet 3    diphenoxylate-atropine (LOMOTIL) 2.5-0.025 MG tablet TAKE 1 TABLET BY MOUTH FOUR TIMES DAILY AS NEEDED FOR DIARRHEA 95 tablet 5     doxycycline hyclate (VIBRAMYCIN) 100 MG capsule Take 100 mg by mouth 2 times daily      dutasteride (AVODART) 0.5 MG capsule TAKE ONE CAPSULE (0.5 MG) BY MOUTH DAILY 95 capsule 3    glimepiride (AMARYL) 2 MG tablet Take 1 tablet (2 mg) by mouth every morning (before breakfast) 95 tablet 3    hydrocortisone 2.5 % ointment Apply topically 2 times daily 30 g 1    ibuprofen (ADVIL/MOTRIN) 600 MG tablet Take 600 mg by mouth as needed      lidocaine (XYLOCAINE) 5 % external ointment Apply topically 3 times daily as needed for moderate pain (anal pruritis) 15 g 3    meloxicam (MOBIC) 15 MG tablet       metFORMIN (GLUCOPHAGE XR) 500 MG 24 hr tablet Take 4 tablets (2,000 mg) by mouth daily 370 tablet 3    modafinil (PROVIGIL) 200 MG tablet Take 1 tablet (200 mg) by mouth every morning 30 tablet 5    oxyCODONE-acetaminophen (PERCOCET) 5-325 MG tablet Take 1 tablet by mouth every 6 hours as needed for pain or severe pain 30 tablet 0    rosuvastatin (CRESTOR) 5 MG tablet Take 1 tablet (5 mg) by mouth daily 90 tablet 3    sildenafil (VIAGRA) 100 MG tablet Take 1 tablet (100 mg) by mouth daily as needed (for sexual intercourse) 10 tablet 0    tadalafil (ADCIRCA/CIALIS) 20 MG tablet Take 1 tablet (20 mg) by mouth daily as needed (prior to sexual activity) 30 tablet 1    tiZANidine (ZANAFLEX) 4 MG tablet   See Instructions, Instructions: 0.5-2 tab(s) Oral q8 hrs as needed, # 30 tab(s), 1 Refill(s), Type: Maintenance, Pharmacy: [facility] #[id number], 0.5-2 tab(s) Oral q8 hrs as needed, 159, lb, 05/08/23 14:41:00 CDT, Weight Measured      venlafaxine (EFFEXOR XR) 75 MG 24 hr capsule Take 1 capsule (75 mg) by mouth daily 95 capsule 3       Social History     Tobacco Use    Smoking status: Never    Smokeless tobacco: Current     Types: Chew   Vaping Use    Vaping Use: Never used       Nadeem Paulino comes into clinic today at the request of Dr Rodgers  for intracavernosal injection teaching.    Diagnosis: erectile  dysfunction    This service provided today was under the direct supervision of Dr Rodgers , who was available if needed.    Nadeem Paulino presents to clinic for Edex injection teaching.  Edex informational patient packet was read and reviewed in clinic by patient.  Reviewed usage and possible side effects.  Questions answered appropriately.  Patient injected 20 mcg with assistance. Assisted patient's hand in directing the syringe into penis.  After 20 minutes, patient reassessed.  Patient rates his erection a 6/10. Patient stated that it would not be firm enough for penetration.  Patient educated on alternating sites for injection, left and right side of shaft. Also discussed not to use more than 3 times per week, at least 24 hours between each injection. Also discussed prolonged erections and need to go to ER if that happens.  Patient verbalized understanding. No further questions.  Patient to call if he has any questions or concerns.  Prescription for Edex 20 mcg.    The following medication was given:     MEDICATION:  Edex  ROUTE:  intracavity  SITE: left side of penis  DOSE: 20 mcg  LOT #: [id number]  : [employer]  EXPIRATION DATE: 08/2025  NDC#: 46050-810-27   Was there drug waste? No    Prior to injection, verified patient identity using patient's name and date of birth.  Due to injection administration, patient instructed to remain in clinic for 15 minutes  afterwards, and to report any adverse reaction to me immediately.        Rosa Vogt RN, BSN  1/5/2024  7:09 PM

## 2024-01-08 ENCOUNTER — TELEPHONE (OUTPATIENT)
Dept: UROLOGY | Facility: CLINIC | Age: 74
End: 2024-01-08
Payer: MEDICARE

## 2024-01-08 NOTE — TELEPHONE ENCOUNTER
----- Message from Fabio Rodgers MD sent at 1/5/2024  8:11 PM CST -----  [facility] Easy  Thank You    ----- Message -----  From: Rosa Vogt RN  Sent: 1/5/2024   7:17 PM CST  To: MD Tru Steward,  This patient was here today for injection teaching and wants to know if you could [facility] because it [facility] be [facility] for him.   Let me know if you are able to otherwise he can see his primary out there     Max

## 2024-01-08 NOTE — TELEPHONE ENCOUNTER
Left message for the [facility] to [facility] for     Rosa Vogt, RN, BSN  Care Coordinator Urology  [facility] [place]  Urology [facility]  [phone]

## 2024-02-29 DIAGNOSIS — N52.9 ERECTILE DYSFUNCTION, UNSPECIFIED ERECTILE DYSFUNCTION TYPE: ICD-10-CM

## 2024-02-29 RX ORDER — TADALAFIL 20 MG/1
20 TABLET ORAL DAILY PRN
Qty: 30 TABLET | Refills: 1 | Status: SHIPPED | OUTPATIENT
Start: 2024-02-29 | End: 2024-07-24

## 2024-02-29 NOTE — TELEPHONE ENCOUNTER
Medication Question or Refill    Contacts         Type Contact Phone/Fax    02/29/2024 10:26 AM CST In Person (Incoming) Nadeem Paulino (Self)         Pharmacy: [facility]    What medication are you calling about (include dose and sig): Cyclocet.Has become so expensive, and was $540 for one 30 day supply recently) Is there something else for Type II Diabetes that he can use? (Patient dropped off letter to .)    Preferred Pharmacy:   [facility] #[id number] - [facility] [address]  [address] [address]  Phone: [phone] Fax: [phone]        Controlled Substance Agreement on file:   CSA -- Patient Level:    CSA: None found at the patient level.       Who prescribed the medication?: KAH    Do you need a refill? Yes    Patient offered an appointment? No    Do you have any questions or concerns?  Yes: See above.      Okay to leave a detailed message?: Yes at Home number on file [phone] (home)     Please advise patient if there is a cheaper medication and call to confirm.

## 2024-03-01 ENCOUNTER — DOCUMENTATION ONLY (OUTPATIENT)
Dept: FAMILY MEDICINE | Facility: CLINIC | Age: 74
End: 2024-03-01
Payer: MEDICARE

## 2024-03-01 DIAGNOSIS — E11.9 TYPE 2 DIABETES MELLITUS WITHOUT COMPLICATION, WITHOUT LONG-TERM CURRENT USE OF INSULIN (H): ICD-10-CM

## 2024-03-01 RX ORDER — GLIMEPIRIDE 2 MG/1
4 TABLET ORAL
Qty: 180 TABLET | Refills: 0 | Status: SHIPPED
Start: 2024-03-01 | End: 2024-03-18

## 2024-03-01 NOTE — PROGRESS NOTES
Received handwritten note from patient.  Apprised of the Cycloset which [facility] for [facility] is now cost prohibitive.  He wonders about other options.  In reviewing his medications it looks like the easiest and most inexpensive thing would be to increase his Amaryl from 2 to 4 mg daily.  I have informed him of this. Will monitor him for side effects and A1c response.    KAH

## 2024-03-18 ENCOUNTER — OFFICE VISIT (OUTPATIENT)
Dept: FAMILY MEDICINE | Facility: CLINIC | Age: 74
End: 2024-03-18
Payer: MEDICARE

## 2024-03-18 VITALS
HEART RATE: 74 BPM | TEMPERATURE: 98.2 F | WEIGHT: 161 LBS | HEIGHT: 68 IN | SYSTOLIC BLOOD PRESSURE: 122 MMHG | OXYGEN SATURATION: 99 % | BODY MASS INDEX: 24.4 KG/M2 | DIASTOLIC BLOOD PRESSURE: 62 MMHG | RESPIRATION RATE: 18 BRPM

## 2024-03-18 DIAGNOSIS — E11.69 DISORDER OF NERVOUS SYSTEM DUE TO TYPE 2 DIABETES MELLITUS (H): Primary | ICD-10-CM

## 2024-03-18 DIAGNOSIS — Z29.11 NEED FOR VACCINATION AGAINST RESPIRATORY SYNCYTIAL VIRUS: ICD-10-CM

## 2024-03-18 DIAGNOSIS — E11.9 TYPE 2 DIABETES MELLITUS WITHOUT COMPLICATION, WITHOUT LONG-TERM CURRENT USE OF INSULIN (H): ICD-10-CM

## 2024-03-18 DIAGNOSIS — F41.1 GENERALIZED ANXIETY DISORDER: ICD-10-CM

## 2024-03-18 DIAGNOSIS — G98.8 DISORDER OF NERVOUS SYSTEM DUE TO TYPE 2 DIABETES MELLITUS (H): Primary | ICD-10-CM

## 2024-03-18 PROCEDURE — 99213 OFFICE O/P EST LOW 20 MIN: CPT | Performed by: PHYSICIAN ASSISTANT

## 2024-03-18 RX ORDER — VENLAFAXINE HYDROCHLORIDE 37.5 MG/1
37.5 CAPSULE, EXTENDED RELEASE ORAL DAILY
Qty: 14 CAPSULE | Refills: 0 | Status: SHIPPED | OUTPATIENT
Start: 2024-03-18 | End: 2024-07-03

## 2024-03-18 RX ORDER — RESPIRATORY SYNCYTIAL VIRUS VACCINE 120MCG/0.5
0.5 KIT INTRAMUSCULAR ONCE
Qty: 1 EACH | Refills: 0 | Status: CANCELLED | OUTPATIENT
Start: 2024-03-18 | End: 2024-03-18

## 2024-03-18 RX ORDER — BUPROPION HYDROCHLORIDE 150 MG/1
150 TABLET ORAL EVERY MORNING
Qty: 30 TABLET | Refills: 3 | Status: SHIPPED | OUTPATIENT
Start: 2024-03-18 | End: 2024-06-24

## 2024-03-18 RX ORDER — GLIMEPIRIDE 2 MG/1
4 TABLET ORAL
Qty: 180 TABLET | Refills: 0 | Status: SHIPPED | OUTPATIENT
Start: 2024-03-18 | End: 2024-06-24

## 2024-03-18 NOTE — PROGRESS NOTES
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)  (primary encounter diagnosis)  Comment: Increase Amaryl to 4 mg decrease Cycloset  Plan: HEMOGLOBIN A1C, BASIC METABOLIC PANEL, Lipid         panel reflex to direct LDL Non-fasting            (Z29.11) Need for vaccination against respiratory syncytial virus  Comment: get at pharmacy  Plan:     (F41.1) Generalized anxiety disorder  Comment: Venlafaxine and start Wellbutrin  Plan: venlafaxine (EFFEXOR XR) 37.5 MG 24 hr capsule,        buPROPion (WELLBUTRIN XL) 150 MG 24 hr tablet            (E11.9) Type 2 diabetes mellitus without complication, without long-term current use of insulin (H)  Comment: Decrease Cycloset increase Amaryl to 4 mg daily A1c in 3 months  Plan: glimepiride (AMARYL) 2 MG tablet                        Viji Silver is a 73 year old, presenting for the following health issues:  Medication Problem (Discuss med alternatives, cost and side effects.)        3/18/2024     2:05 PM   Additional Questions   Roomed by Ana M VALERA   Accompanied by kolton     Patient had question about his medication he did not get my message that I left on dedicated identified voicemail.  The Cycloset is because prohibitive for him I think the best option is to increase his glimepiride from 2 to 4 mg once daily  His last A1c was acceptable he is due for repeat lab work will return  His back pain is stable  He has no other complaints or concerns  He would like to get off his venlafaxine and try bupropion no history of seizure disorder told him we will need to wean off the venlafaxine                       Objective    /62   Pulse 74   Temp 98.2  F (36.8  C)   Resp 18   Ht 1.727 m (5' 8\")   Wt 73 kg (161 lb)   SpO2 99%   BMI 24.48 kg/m    Body mass index is 24.48 kg/m .  Physical Exam alert attentive no acute distress  Lungs clear well ventilated no wheeze rales or rhonchi respirations unlabored  Cardiovascular " regular rate and rhythm              Signed Electronically by: RILEY Reyes

## 2024-03-29 ENCOUNTER — TELEPHONE (OUTPATIENT)
Dept: FAMILY MEDICINE | Facility: CLINIC | Age: 74
End: 2024-03-29
Payer: MEDICARE

## 2024-03-29 DIAGNOSIS — R19.7 DIARRHEA, UNSPECIFIED TYPE: ICD-10-CM

## 2024-03-29 RX ORDER — DIPHENOXYLATE HCL/ATROPINE 2.5-.025MG
1 TABLET ORAL 4 TIMES DAILY PRN
Qty: 120 TABLET | Refills: 5 | Status: SHIPPED | OUTPATIENT
Start: 2024-03-29 | End: 2024-06-24

## 2024-03-29 NOTE — TELEPHONE ENCOUNTER
Patient calling to get refill of Lomotil, He states that he needs 120 pills per month to control his diarrhea and he is taking this 4x daily. Patient states that Meek know all about his diarrhea and is demanding that Meek addresses this asap. Pended for approval.     Updated, RN let patient know this has been addressed and sent in.

## 2024-04-30 DIAGNOSIS — N40.0 BENIGN PROSTATIC HYPERPLASIA, UNSPECIFIED WHETHER LOWER URINARY TRACT SYMPTOMS PRESENT: ICD-10-CM

## 2024-05-01 RX ORDER — DUTASTERIDE 0.5 MG/1
CAPSULE, LIQUID FILLED ORAL
Qty: 90 CAPSULE | Refills: 2 | Status: SHIPPED | OUTPATIENT
Start: 2024-05-01

## 2024-05-16 ENCOUNTER — TELEPHONE (OUTPATIENT)
Dept: FAMILY MEDICINE | Facility: CLINIC | Age: 74
End: 2024-05-16
Payer: MEDICARE

## 2024-05-16 DIAGNOSIS — I25.10 CORONARY ARTERY CALCIFICATION SEEN ON CT SCAN: ICD-10-CM

## 2024-05-16 NOTE — TELEPHONE ENCOUNTER
Medication Question or Refill Needs ASAP. Thank you        What medication are you calling about (include dose and sig)?: rosuvastatin (CRESTOR) 5 MG tablet [[id number]] (Order [id number])     [id number] - [place], [place] - [address]  [address] [address]  Phone: [phone] Fax: [phone]    Controlled Substance Agreement on file:   CSA -- Patient Level:    CSA: None found at the patient level.       Who prescribed the medication?: Helreyna    Do you need a refill? Yes    When did you use the medication last? This morning     Patient offered an appointment? No    Do you have any questions or concerns?  No      Okay to leave a detailed message?: Yes at Cell number on file:    Telephone Information:   Mobile [phone]

## 2024-05-17 RX ORDER — ROSUVASTATIN CALCIUM 5 MG/1
5 TABLET, COATED ORAL DAILY
Qty: 90 TABLET | Refills: 0 | Status: SHIPPED | OUTPATIENT
Start: 2024-05-17 | End: 2024-06-24

## 2024-05-17 NOTE — TELEPHONE ENCOUNTER
Prescription approved per [facility] Refill Protocol.  Guera refill  Last Written Prescription Date:  5/10/23  Last Fill Quantity: 90,  # refills: 3   Last office visit: 3/18/2024    Attempted to call pt, no VM set up.

## 2024-05-30 DIAGNOSIS — E11.9 TYPE 2 DIABETES MELLITUS WITHOUT COMPLICATION, WITHOUT LONG-TERM CURRENT USE OF INSULIN (H): ICD-10-CM

## 2024-05-31 RX ORDER — METFORMIN HCL 500 MG
TABLET, EXTENDED RELEASE 24 HR ORAL
Qty: 360 TABLET | Refills: 1 | Status: SHIPPED | OUTPATIENT
Start: 2024-05-31

## 2024-06-24 DIAGNOSIS — F41.1 GENERALIZED ANXIETY DISORDER: ICD-10-CM

## 2024-06-24 DIAGNOSIS — R19.7 DIARRHEA, UNSPECIFIED TYPE: ICD-10-CM

## 2024-06-24 DIAGNOSIS — I25.10 CORONARY ARTERY CALCIFICATION SEEN ON CT SCAN: ICD-10-CM

## 2024-06-24 DIAGNOSIS — E11.9 TYPE 2 DIABETES MELLITUS WITHOUT COMPLICATION, WITHOUT LONG-TERM CURRENT USE OF INSULIN (H): ICD-10-CM

## 2024-06-24 RX ORDER — GLIMEPIRIDE 2 MG/1
4 TABLET ORAL
Qty: 180 TABLET | Refills: 0 | Status: SHIPPED | OUTPATIENT
Start: 2024-06-24 | End: 2024-09-03

## 2024-06-24 RX ORDER — ROSUVASTATIN CALCIUM 5 MG/1
5 TABLET, COATED ORAL DAILY
Qty: 90 TABLET | Refills: 0 | Status: SHIPPED | OUTPATIENT
Start: 2024-06-24 | End: 2024-07-03

## 2024-06-24 RX ORDER — BUPROPION HYDROCHLORIDE 150 MG/1
150 TABLET ORAL EVERY MORNING
Qty: 90 TABLET | Refills: 1 | Status: SHIPPED | OUTPATIENT
Start: 2024-06-24 | End: 2024-07-03

## 2024-06-24 RX ORDER — DIPHENOXYLATE HCL/ATROPINE 2.5-.025MG
1 TABLET ORAL 4 TIMES DAILY PRN
Qty: 120 TABLET | Refills: 5 | Status: SHIPPED | OUTPATIENT
Start: 2024-06-24

## 2024-06-24 NOTE — TELEPHONE ENCOUNTER
Medication Question or Refill    Contacts       Contact Date/Time Type Contact Phone/Fax    06/24/2024 09:34 AM CDT Phone (Incoming) Nadeem Paulino (Self) [phone] (H)            What medication are you calling about (include dose and sig)?: buPROPion (WELLBUTRIN XL) 150 MG 24 hr tablet; diphenoxylate-atropine (LOMOTIL) 2.5-0.025 MG tablet; glimepiride (AMARYL) 2 MG tablet; & rosuvastatin (CRESTOR) 5 MG tablet        Preferred Pharmacy:   [facility] #[id number] [place], [place] - [address]  [address] [address]  Phone: [phone] Fax: [phone]      Controlled Substance Agreement on file:   CSA -- Patient Level:    CSA: None found at the patient level.       Who prescribed the medication?: Meek Serna    Do you need a refill? Yes, only on diphenoxylate-atropine (LOMOTIL) 2.5-0.025 MG tablet; glimepiride (AMARYL) 2 MG tablet; & rosuvastatin (CRESTOR) 5 MG tablet    Do you have any questions or concerns?  Yes: pt states he dropped open bottle of Rosuvastatin Rx and needs this refilled; pt would also like to know if it is possible to get 90 day supply on the Bupropion, instead of 30 day      Okay to leave a detailed message?: Yes at Home number on file [phone] (home)

## 2024-06-26 DIAGNOSIS — G89.29 OTHER CHRONIC PAIN: ICD-10-CM

## 2024-06-26 DIAGNOSIS — G47.33 OSA (OBSTRUCTIVE SLEEP APNEA): ICD-10-CM

## 2024-06-26 RX ORDER — OXYCODONE AND ACETAMINOPHEN 5; 325 MG/1; MG/1
1 TABLET ORAL EVERY 6 HOURS PRN
Qty: 30 TABLET | Refills: 0 | Status: SHIPPED | OUTPATIENT
Start: 2024-06-26

## 2024-06-26 RX ORDER — MODAFINIL 200 MG/1
200 TABLET ORAL EVERY MORNING
Qty: 30 TABLET | Refills: 5 | Status: SHIPPED | OUTPATIENT
Start: 2024-06-26

## 2024-06-26 NOTE — TELEPHONE ENCOUNTER
Pt returned call and stated he takes Modafinil only when traveling or doing a lot of driving. Pt is requesting medication refill.

## 2024-06-26 NOTE — TELEPHONE ENCOUNTER
RN attempted to contact patient.   LM requesting call back to discuss Medication Refill Request.          modafinil (PROVIGIL) 200 MG tablet  Authorized By: Meek Serna PA

## 2024-07-03 ENCOUNTER — OFFICE VISIT (OUTPATIENT)
Dept: FAMILY MEDICINE | Facility: CLINIC | Age: 74
End: 2024-07-03
Payer: MEDICARE

## 2024-07-03 VITALS
TEMPERATURE: 97.5 F | BODY MASS INDEX: 25.29 KG/M2 | SYSTOLIC BLOOD PRESSURE: 120 MMHG | WEIGHT: 166.9 LBS | OXYGEN SATURATION: 97 % | HEIGHT: 68 IN | DIASTOLIC BLOOD PRESSURE: 66 MMHG | HEART RATE: 66 BPM | RESPIRATION RATE: 16 BRPM

## 2024-07-03 DIAGNOSIS — E11.69 DISORDER OF NERVOUS SYSTEM DUE TO TYPE 2 DIABETES MELLITUS (H): ICD-10-CM

## 2024-07-03 DIAGNOSIS — G98.8 DISORDER OF NERVOUS SYSTEM DUE TO TYPE 2 DIABETES MELLITUS (H): ICD-10-CM

## 2024-07-03 DIAGNOSIS — I25.10 CORONARY ARTERY CALCIFICATION SEEN ON CT SCAN: ICD-10-CM

## 2024-07-03 DIAGNOSIS — F41.1 GENERALIZED ANXIETY DISORDER: ICD-10-CM

## 2024-07-03 DIAGNOSIS — G47.33 OSA (OBSTRUCTIVE SLEEP APNEA): ICD-10-CM

## 2024-07-03 DIAGNOSIS — E11.9 TYPE 2 DIABETES MELLITUS WITHOUT COMPLICATION, WITHOUT LONG-TERM CURRENT USE OF INSULIN (H): ICD-10-CM

## 2024-07-03 DIAGNOSIS — Z00.00 MEDICARE ANNUAL WELLNESS VISIT, SUBSEQUENT: Primary | ICD-10-CM

## 2024-07-03 LAB
ANION GAP SERPL CALCULATED.3IONS-SCNC: 8 MMOL/L (ref 7–15)
BUN SERPL-MCNC: 13.7 MG/DL (ref 8–23)
CALCIUM SERPL-MCNC: 9.3 MG/DL (ref 8.8–10.2)
CHLORIDE SERPL-SCNC: 100 MMOL/L (ref 98–107)
CHOLEST SERPL-MCNC: 109 MG/DL
CREAT SERPL-MCNC: 0.87 MG/DL (ref 0.67–1.17)
DEPRECATED HCO3 PLAS-SCNC: 29 MMOL/L (ref 22–29)
EGFRCR SERPLBLD CKD-EPI 2021: >90 ML/MIN/1.73M2
FASTING STATUS PATIENT QL REPORTED: YES
FASTING STATUS PATIENT QL REPORTED: YES
GLUCOSE SERPL-MCNC: 116 MG/DL (ref 70–99)
HBA1C MFR BLD: 6.6 % (ref 0–5.6)
HDLC SERPL-MCNC: 46 MG/DL
LDLC SERPL CALC-MCNC: 44 MG/DL
NONHDLC SERPL-MCNC: 63 MG/DL
POTASSIUM SERPL-SCNC: 5 MMOL/L (ref 3.4–5.3)
SODIUM SERPL-SCNC: 137 MMOL/L (ref 135–145)
TRIGL SERPL-MCNC: 94 MG/DL

## 2024-07-03 PROCEDURE — 80048 BASIC METABOLIC PNL TOTAL CA: CPT | Performed by: PHYSICIAN ASSISTANT

## 2024-07-03 PROCEDURE — 99214 OFFICE O/P EST MOD 30 MIN: CPT | Mod: 25 | Performed by: PHYSICIAN ASSISTANT

## 2024-07-03 PROCEDURE — 83036 HEMOGLOBIN GLYCOSYLATED A1C: CPT | Performed by: PHYSICIAN ASSISTANT

## 2024-07-03 PROCEDURE — 80061 LIPID PANEL: CPT | Performed by: PHYSICIAN ASSISTANT

## 2024-07-03 PROCEDURE — 36415 COLL VENOUS BLD VENIPUNCTURE: CPT | Performed by: PHYSICIAN ASSISTANT

## 2024-07-03 PROCEDURE — G0439 PPPS, SUBSEQ VISIT: HCPCS | Performed by: PHYSICIAN ASSISTANT

## 2024-07-03 RX ORDER — RESPIRATORY SYNCYTIAL VIRUS VACCINE 120MCG/0.5
0.5 KIT INTRAMUSCULAR ONCE
Qty: 1 EACH | Refills: 0 | Status: CANCELLED | OUTPATIENT
Start: 2024-07-03 | End: 2024-07-03

## 2024-07-03 RX ORDER — BUPROPION HYDROCHLORIDE 150 MG/1
150 TABLET ORAL EVERY MORNING
Qty: 90 TABLET | Refills: 3 | Status: SHIPPED | OUTPATIENT
Start: 2024-07-03

## 2024-07-03 RX ORDER — ROSUVASTATIN CALCIUM 5 MG/1
5 TABLET, COATED ORAL DAILY
Qty: 90 TABLET | Refills: 3 | Status: SHIPPED | OUTPATIENT
Start: 2024-07-03

## 2024-07-03 SDOH — HEALTH STABILITY: PHYSICAL HEALTH: ON AVERAGE, HOW MANY DAYS PER WEEK DO YOU ENGAGE IN MODERATE TO STRENUOUS EXERCISE (LIKE A BRISK WALK)?: 2 DAYS

## 2024-07-03 SDOH — HEALTH STABILITY: PHYSICAL HEALTH: ON AVERAGE, HOW MANY MINUTES DO YOU ENGAGE IN EXERCISE AT THIS LEVEL?: 20 MIN

## 2024-07-03 ASSESSMENT — SOCIAL DETERMINANTS OF HEALTH (SDOH): HOW OFTEN DO YOU GET TOGETHER WITH FRIENDS OR RELATIVES?: TWICE A WEEK

## 2024-07-03 ASSESSMENT — PATIENT HEALTH QUESTIONNAIRE - PHQ9
SUM OF ALL RESPONSES TO PHQ QUESTIONS 1-9: 2
10. IF YOU CHECKED OFF ANY PROBLEMS, HOW DIFFICULT HAVE THESE PROBLEMS MADE IT FOR YOU TO DO YOUR WORK, TAKE CARE OF THINGS AT HOME, OR GET ALONG WITH OTHER PEOPLE: NOT DIFFICULT AT ALL
SUM OF ALL RESPONSES TO PHQ QUESTIONS 1-9: 2

## 2024-07-03 NOTE — PROGRESS NOTES
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)  Comment: Stable  Plan: Continue current plan    (F41.1) Generalized anxiety disorder  Comment: Stable  Plan: buPROPion (WELLBUTRIN XL) 150 MG 24 hr tablet        Renewed his medication    (I25.10) Coronary artery calcification seen on CT scan  Comment: Follows with cardiology  Plan: rosuvastatin (CRESTOR) 5 MG tablet        Continue Crestor    (G47.33) TIMOTHY (obstructive sleep apnea)  Comment: Stable  Plan: Continue current plan    (E11.9) Type 2 diabetes mellitus without complication, without long-term current use of insulin (H)  Comment:   Plan: FOOT EXAM, CANCELED: Albumin Random Urine         Quantitative with Creat Ratio        Labs today            BMI  Estimated body mass index is 25.38 kg/m  as calculated from the following:    Height as of this encounter: 1.727 m (5' 8\").    Weight as of this encounter: 75.7 kg (166 lb 14.4 oz).       Counseling  Appropriate preventive services were discussed with this patient, including applicable screening as appropriate for fall prevention, nutrition, physical activity, Tobacco-use cessation, weight loss and cognition.  Checklist reviewing preventive services available has been given to the patient.  Reviewed patient's diet, addressing concerns and/or questions.   He is at risk for lack of exercise and has been provided with information to increase physical activity for the benefit of his well-being.   He is at risk for psychosocial distress and has been provided with information to reduce risk.   Discussed possible causes of fatigue. The patient was provided with written information regarding signs of hearing loss.   I " have reviewed Opioid Use Disorder and Substance Use Disorder risk factors and made any needed referrals.           Viji Silver is a 73 year old, presenting for the following:  Medicare Visit (AWV)        7/3/2024    11:11 AM   Additional Questions   Roomed by IAN Rios         Health Care Directive  Patient does not have a Health Care Directive or Living Will:     73-year-old presents to the clinic for annual wellness visit  He does need refill of a couple of his medications  He has no other concerns  He has chronic diarrhea he uses Lomotil on a daily basis  [facility]  He was due for some lab work which was obtained he was unable to provide a urine for urine microalbumin will get that at his next visit                  7/3/2024   General Health   How would you rate your overall physical health? (!) FAIR   Feel stress (tense, anxious, or unable to sleep) Only a little      (!) STRESS CONCERN      7/3/2024   Nutrition   Diet: Diabetic            7/3/2024   Exercise   Days per week of moderate/strenous exercise 2 days   Average minutes spent exercising at this level 20 min      (!) EXERCISE CONCERN      7/3/2024   Social Factors   Frequency of gathering with friends or relatives Twice a week   Worry food won't last until get money to buy more No   Food not last or not have enough money for food? No   Do you have housing? (Housing is defined as stable permanent housing and does not include staying ouside in a car, in a tent, in an abandoned building, in an overnight shelter, or couch-surfing.) Yes   Are you worried about losing your housing? No   Lack of transportation? No   Unable to get utilities (heat,electricity)? No            7/3/2024   Fall Risk   Fallen 2 or more times in the past year? No   Trouble with walking or balance? No             7/3/2024   Activities of Daily Living- Home Safety   Needs help with the following daily activites None of the above   Safety concerns in the  home None of the above            7/3/2024   Dental   Dentist two times every year? Yes            7/3/2024   Hearing Screening   Hearing concerns? (!) IT'S HARD TO FOLLOW A CONVERSATION IN A NOISY RESTAURANT OR CROWDED ROOM.            7/3/2024   Driving Risk Screening   Patient/family members have concerns about driving No            7/3/2024   General Alertness/Fatigue Screening   Have you been more tired than usual lately? (!) YES            7/3/2024   Urinary Incontinence Screening   Bothered by leaking urine in past 6 months No            7/3/2024   TB Screening   Were you born outside of the [place]? No          Today's PHQ-9 Score:       7/3/2024    10:59 AM   PHQ-9 SCORE   PHQ-9 Total Score MyChart 2 (Minimal depression)   PHQ-9 Total Score 2         7/3/2024   Substance Use   Alcohol more than 3/day or more than 7/wk Not Applicable   Do you have a current opioid prescription? (!) YES   How severe/bad is pain from 1 to 10? 2/10   Do you use any other substances recreationally? No          Social History     Tobacco Use    Smoking status: Never     Passive exposure: Never    Smokeless tobacco: Current     Types: Chew   Vaping Use    Vaping status: Never Used           7/3/2024   AAA Screening   Family history of Abdominal Aortic Aneurysm (AAA)? No      ASCVD Risk   The ASCVD Risk score (Sushma OCASIO, et al., 2019) failed to calculate for the following reasons:    The valid total cholesterol range is 130 to 320 mg/dL            Reviewed and updated as needed this visit by Provider                    Past Medical History:   Diagnosis Date    DMII (diabetes mellitus, type 2) (H)     Mumps     Prostate infection     STD (sexually transmitted disease)     Swelling of testicle      Current providers sharing in care for this patient include:  Patient Care Team:  Meek Serna PA as PCP - General (Family Medicine)  Meek Serna PA as Physician Assistant (Family Medicine)  Meek Serna PA as Physician  "Assistant (Family Medicine)  Meek Serna PA as Assigned PCP  Micheal Cotter MD as MD (Cardiovascular Disease)  Vaibhav Fiore MD as MD (Urology)  Micheal Cotter MD as Assigned Heart and Vascular Provider  Bety Velasquez PA-C as Physician Assistant (Urology)  Fabio Rodgers MD as Assigned Surgical Provider    The following health maintenance items are reviewed in Epic and correct as of today:  Health Maintenance   Topic Date Due    URINE DRUG SCREEN  Never done    DEPRESSION ACTION PLAN  Never done    RSV VACCINE (Pregnancy & 60+) (1 - 1-dose 60+ series) Never done    COVID-19 Vaccine (1 - 2023-24 season) Never done    A1C  11/23/2023    BMP  03/27/2024    LIPID  03/27/2024    MEDICARE ANNUAL WELLNESS VISIT  03/31/2024    DIABETIC FOOT EXAM  03/31/2024    MICROALBUMIN  08/29/2024    INFLUENZA VACCINE (1) 09/01/2024    EYE EXAM  12/04/2024    PHQ-9  01/03/2025    ANNUAL REVIEW OF HM ORDERS  03/18/2025    FALL RISK ASSESSMENT  07/03/2025    COLORECTAL CANCER SCREENING  05/03/2027    ADVANCE CARE PLANNING  03/31/2028    DTAP/TDAP/TD IMMUNIZATION (5 - Td or Tdap) 01/01/2031    HEPATITIS C SCREENING  Completed    Pneumococcal Vaccine: 65+ Years  Completed    ZOSTER IMMUNIZATION  Completed    IPV IMMUNIZATION  Aged Out    HPV IMMUNIZATION  Aged Out    MENINGITIS IMMUNIZATION  Aged Out    RSV MONOCLONAL ANTIBODY  Aged Out         Review of Systems  CONSTITUTIONAL: NEGATIVE for fever, chills, change in weight  ENT/MOUTH: NEGATIVE for ear, mouth and throat problems  RESP: NEGATIVE for significant cough or SOB  CV: NEGATIVE for chest pain, palpitations or peripheral edema     Objective    Exam  /66 (BP Location: Right arm, Patient Position: Sitting, Cuff Size: Adult Regular)   Pulse 66   Temp 97.5  F (36.4  C) (Tympanic)   Resp 16   Ht 1.727 m (5' 8\")   Wt 75.7 kg (166 lb 14.4 oz)   SpO2 97%   BMI 25.38 kg/m     Estimated body mass index is 25.38 kg/m  as calculated from the following:    " "Height as of this encounter: 1.727 m (5' 8\").    Weight as of this encounter: 75.7 kg (166 lb 14.4 oz).    Physical Exam  Vitals and nursing note reviewed.   Constitutional:       Appearance: Normal appearance.   HENT:      Head: Normocephalic and atraumatic.      Right Ear: Tympanic membrane normal.      Left Ear: Tympanic membrane normal.      Nose: Nose normal. No congestion or rhinorrhea.      Mouth/Throat:      Mouth: Mucous membranes are moist.   Eyes:      Conjunctiva/sclera: Conjunctivae normal.   Cardiovascular:      Rate and Rhythm: Normal rate and regular rhythm.      Heart sounds: Normal heart sounds.   Pulmonary:      Effort: Pulmonary effort is normal.      Breath sounds: Normal breath sounds.   Abdominal:      General: Abdomen is flat. Bowel sounds are normal.      Palpations: There is no mass.      Tenderness: There is no abdominal tenderness.   Musculoskeletal:         General: Normal range of motion.      Cervical back: Normal range of motion and neck supple.      Right lower leg: No edema.      Left lower leg: No edema.   Lymphadenopathy:      Cervical: No cervical adenopathy.   Skin:     General: Skin is warm and dry.      Findings: No rash.   Neurological:      General: No focal deficit present.      Comments: Monofilament 5/5 bilaterally   Psychiatric:         Mood and Affect: Mood normal.         Behavior: Behavior normal.         Thought Content: Thought content normal.         Judgment: Judgment normal.               7/3/2024   Mini Cog   Mini-Cog Not Completed (choose reason) Patient declines          Patient declines, there are NO concerns for cognitive deficits.           Signed Electronically by: RILEY Reyes    Answers submitted by the patient for this visit:  Patient Health Questionnaire (Submitted on 7/3/2024)  If you checked off any problems, how difficult have these problems made it for you to do your work, take care of things at home, or get along with other people?: Not " difficult at all  PHQ9 TOTAL SCORE: 2

## 2024-07-03 NOTE — LETTER
July 3, 2024      Nadeem EMANUEL [place]  [address] [facility] [facility] [address]        Dear ,    We are writing to inform you of your test results.    Your test results fall within the expected range(s) or remain unchanged from previous results.  Please continue with current treatment plan.    Resulted Orders   HEMOGLOBIN A1C   Result Value Ref Range    Hemoglobin A1C 6.6 (H) 0.0 - 5.6 %      Comment:      Normal <5.7%   Prediabetes 5.7-6.4%    Diabetes 6.5% or higher     Note: Adopted from ADA consensus guidelines.       If you have any questions or concerns, please call the clinic at the number listed above.       Sincerely,      RILEY Villareal

## 2024-07-03 NOTE — LETTER
July 5, 2024      Nadeem EMANUEL Jefferson  [address] [facility] [address]        Dear ,    We are writing to inform you of your test results.    Your lipids are excellent.  Your chemistries are acceptable.    Resulted Orders   HEMOGLOBIN A1C   Result Value Ref Range    Hemoglobin A1C 6.6 (H) 0.0 - 5.6 %      Comment:      Normal <5.7%   Prediabetes 5.7-6.4%    Diabetes 6.5% or higher     Note: Adopted from ADA consensus guidelines.   BASIC METABOLIC PANEL   Result Value Ref Range    Sodium 137 135 - 145 mmol/L    Potassium 5.0 3.4 - 5.3 mmol/L    Chloride 100 98 - 107 mmol/L    Carbon Dioxide (CO2) 29 22 - 29 mmol/L    Anion Gap 8 7 - 15 mmol/L    Urea Nitrogen 13.7 8.0 - 23.0 mg/dL    Creatinine 0.87 0.67 - 1.17 mg/dL    GFR Estimate >90 >60 mL/min/1.73m2      Comment:      eGFR calculated using 2021 CKD-EPI equation.    Calcium 9.3 8.8 - 10.2 mg/dL    Glucose 116 (H) 70 - 99 mg/dL    Patient Fasting > 8hrs? Yes    Lipid panel reflex to direct LDL Non-fasting   Result Value Ref Range    Cholesterol 109 <200 mg/dL    Triglycerides 94 <150 mg/dL    Direct Measure HDL 46 >=40 mg/dL    LDL Cholesterol Calculated 44 <=100 mg/dL    Non HDL Cholesterol 63 <130 mg/dL    Patient Fasting > 8hrs? Yes     Narrative    Cholesterol  Desirable:  <200 mg/dL    Triglycerides  Normal:  Less than 150 mg/dL  Borderline High:  150-199 mg/dL  High:  200-499 mg/dL  Very High:  Greater than or equal to 500 mg/dL    Direct Measure HDL  Female:  Greater than or equal to 50 mg/dL   Male:  Greater than or equal to 40 mg/dL    LDL Cholesterol  Desirable:  <100mg/dL  Above Desirable:  100-129 mg/dL   Borderline High:  130-159 mg/dL   High:  160-189 mg/dL   Very High:  >= 190 mg/dL    Non HDL Cholesterol  Desirable:  130 mg/dL  Above Desirable:  130-159 mg/dL  Borderline High:  160-189 mg/dL  High:  190-219 mg/dL  Very High:  Greater than or equal to 220 mg/dL       If you have any questions or concerns, please call the clinic at the  number listed above.       Sincerely,      RILEY Villareal

## 2024-07-24 DIAGNOSIS — N52.9 ERECTILE DYSFUNCTION, UNSPECIFIED ERECTILE DYSFUNCTION TYPE: ICD-10-CM

## 2024-07-24 RX ORDER — TADALAFIL 20 MG/1
20 TABLET ORAL DAILY PRN
Qty: 30 TABLET | Refills: 1 | Status: SHIPPED | OUTPATIENT
Start: 2024-07-24

## 2024-08-06 DIAGNOSIS — Z12.5 SCREENING FOR PROSTATE CANCER: Primary | ICD-10-CM

## 2024-08-30 DIAGNOSIS — E11.9 TYPE 2 DIABETES MELLITUS WITHOUT COMPLICATION, WITHOUT LONG-TERM CURRENT USE OF INSULIN (H): ICD-10-CM

## 2024-09-03 RX ORDER — GLIMEPIRIDE 2 MG/1
TABLET ORAL
Qty: 180 TABLET | Refills: 0 | Status: SHIPPED | OUTPATIENT
Start: 2024-09-03

## 2024-09-03 NOTE — TELEPHONE ENCOUNTER
Last office visit: 7/3/2024     Future Appointments 9/3/2024 - 3/2/2025      None            Requested Prescriptions   Pending Prescriptions Disp Refills    glimepiride (AMARYL) 2 MG tablet [Pharmacy Med Name: GLIMEPIRIDE 2MG TABLETS] 180 tablet 0     Sig: TAKE 2 TABLETS(4 MG) BY MOUTH EVERY MORNING BEFORE BREAKFAST       Sulfonylurea Agents Passed - 8/30/2024  5:47 PM        Passed - Patient has documented A1c within the specified period of time.     If HgbA1C is 8 or greater, it needs to be on file within the past 3 months.  If less than 8, must be on file within the past 6 months.     Recent Labs   Lab Test 07/03/24  1122   A1C 6.6*             Passed - Medication is active on med list        Passed - Has GFR on file in past 12 months and most recent value is normal        Passed - Recent (6 mo) or future (90 days) visit within the authorizing provider's specialty     The patient must have completed an in-person or virtual visit within the past 6 months or has a future visit scheduled within the next 90 days with the authorizing provider s specialty.  Urgent care and e-visits do not quality as an office visit for this protocol.          Passed - Medication indicated for associated diagnosis     Medication is associated with one or more of the following diagnoses:  Maturity-onset diabetes of the young   Peripheral circulatory disorder due to type 2 diabetes mellitus   Type 2 diabetes mellitus           Passed - Patient is age 18 or older        Passed - Patient has a recent creatinine (normal) within the past 12 mos.     Recent Labs   Lab Test 07/03/24  1122   CR 0.87

## 2024-09-05 ENCOUNTER — LAB (OUTPATIENT)
Dept: LAB | Facility: CLINIC | Age: 74
End: 2024-09-05
Payer: MEDICARE

## 2024-09-05 DIAGNOSIS — E11.69 DISORDER OF NERVOUS SYSTEM DUE TO TYPE 2 DIABETES MELLITUS (H): Primary | ICD-10-CM

## 2024-09-05 DIAGNOSIS — G98.8 DISORDER OF NERVOUS SYSTEM DUE TO TYPE 2 DIABETES MELLITUS (H): Primary | ICD-10-CM

## 2024-09-05 DIAGNOSIS — Z12.5 SCREENING FOR PROSTATE CANCER: ICD-10-CM

## 2024-09-05 LAB — PSA SERPL DL<=0.01 NG/ML-MCNC: 0.84 NG/ML (ref 0–6.5)

## 2024-09-05 PROCEDURE — 36415 COLL VENOUS BLD VENIPUNCTURE: CPT

## 2024-09-05 PROCEDURE — G0103 PSA SCREENING: HCPCS

## 2024-11-30 DIAGNOSIS — E11.9 TYPE 2 DIABETES MELLITUS WITHOUT COMPLICATION, WITHOUT LONG-TERM CURRENT USE OF INSULIN (H): ICD-10-CM

## 2024-12-02 RX ORDER — METFORMIN HYDROCHLORIDE 500 MG/1
TABLET, EXTENDED RELEASE ORAL
Qty: 360 TABLET | Refills: 1 | Status: SHIPPED | OUTPATIENT
Start: 2024-12-02

## 2024-12-07 DIAGNOSIS — E11.9 TYPE 2 DIABETES MELLITUS WITHOUT COMPLICATION, WITHOUT LONG-TERM CURRENT USE OF INSULIN (H): ICD-10-CM

## 2024-12-09 RX ORDER — GLIMEPIRIDE 2 MG/1
TABLET ORAL
Qty: 180 TABLET | Refills: 1 | Status: SHIPPED | OUTPATIENT
Start: 2024-12-09

## 2024-12-10 ENCOUNTER — OFFICE VISIT (OUTPATIENT)
Dept: FAMILY MEDICINE | Facility: CLINIC | Age: 74
End: 2024-12-10
Payer: MEDICARE

## 2024-12-10 VITALS
RESPIRATION RATE: 16 BRPM | DIASTOLIC BLOOD PRESSURE: 72 MMHG | TEMPERATURE: 98.4 F | BODY MASS INDEX: 25.31 KG/M2 | OXYGEN SATURATION: 99 % | WEIGHT: 167 LBS | HEIGHT: 68 IN | HEART RATE: 67 BPM | SYSTOLIC BLOOD PRESSURE: 124 MMHG

## 2024-12-10 DIAGNOSIS — R21 RASH AND NONSPECIFIC SKIN ERUPTION: ICD-10-CM

## 2024-12-10 DIAGNOSIS — M54.9 DORSALGIA, UNSPECIFIED: ICD-10-CM

## 2024-12-10 DIAGNOSIS — G98.8 DISORDER OF NERVOUS SYSTEM DUE TO TYPE 2 DIABETES MELLITUS (H): Primary | ICD-10-CM

## 2024-12-10 DIAGNOSIS — E11.69 DISORDER OF NERVOUS SYSTEM DUE TO TYPE 2 DIABETES MELLITUS (H): Primary | ICD-10-CM

## 2024-12-10 DIAGNOSIS — I25.10 CORONARY ARTERY CALCIFICATION SEEN ON CT SCAN: ICD-10-CM

## 2024-12-10 DIAGNOSIS — F11.90 CHRONIC, CONTINUOUS USE OF OPIOIDS: ICD-10-CM

## 2024-12-10 PROBLEM — G40.909 SEIZURE DISORDER (H): Status: RESOLVED | Noted: 2022-09-02 | Resolved: 2024-12-10

## 2024-12-10 LAB
AMPHETAMINES UR QL: NOT DETECTED
BARBITURATES UR QL SCN: NOT DETECTED
BENZODIAZ UR QL SCN: NOT DETECTED
BUPRENORPHINE UR QL: NOT DETECTED
CANNABINOIDS UR QL: NOT DETECTED
COCAINE UR QL SCN: NOT DETECTED
CREAT UR-MCNC: 151 MG/DL
D-METHAMPHET UR QL: NOT DETECTED
EST. AVERAGE GLUCOSE BLD GHB EST-MCNC: 157 MG/DL
HBA1C MFR BLD: 7.1 % (ref 0–5.6)
METHADONE UR QL SCN: NOT DETECTED
MICROALBUMIN UR-MCNC: 17.4 MG/L
MICROALBUMIN/CREAT UR: 11.52 MG/G CR (ref 0–17)
OPIATES UR QL SCN: NOT DETECTED
OXYCODONE UR QL SCN: NOT DETECTED
PCP UR QL SCN: NOT DETECTED
TRICYCLICS UR QL SCN: NOT DETECTED

## 2024-12-10 PROCEDURE — 99214 OFFICE O/P EST MOD 30 MIN: CPT | Performed by: PHYSICIAN ASSISTANT

## 2024-12-10 PROCEDURE — 82570 ASSAY OF URINE CREATININE: CPT | Mod: 59 | Performed by: PHYSICIAN ASSISTANT

## 2024-12-10 PROCEDURE — 36415 COLL VENOUS BLD VENIPUNCTURE: CPT | Performed by: PHYSICIAN ASSISTANT

## 2024-12-10 PROCEDURE — 83036 HEMOGLOBIN GLYCOSYLATED A1C: CPT | Performed by: PHYSICIAN ASSISTANT

## 2024-12-10 PROCEDURE — 82043 UR ALBUMIN QUANTITATIVE: CPT | Performed by: PHYSICIAN ASSISTANT

## 2024-12-10 PROCEDURE — 80306 DRUG TEST PRSMV INSTRMNT: CPT | Performed by: PHYSICIAN ASSISTANT

## 2024-12-10 RX ORDER — HYDROCORTISONE 25 MG/G
OINTMENT TOPICAL 2 TIMES DAILY
Qty: 30 G | Refills: 1 | Status: SHIPPED | OUTPATIENT
Start: 2024-12-10

## 2024-12-10 RX ORDER — TRIAMCINOLONE ACETONIDE 1 MG/G
CREAM TOPICAL 2 TIMES DAILY
Qty: 30 G | Refills: 0 | Status: SHIPPED | OUTPATIENT
Start: 2024-12-10

## 2024-12-10 ASSESSMENT — PATIENT HEALTH QUESTIONNAIRE - PHQ9
SUM OF ALL RESPONSES TO PHQ QUESTIONS 1-9: 6
10. IF YOU CHECKED OFF ANY PROBLEMS, HOW DIFFICULT HAVE THESE PROBLEMS MADE IT FOR YOU TO DO YOUR WORK, TAKE CARE OF THINGS AT HOME, OR GET ALONG WITH OTHER PEOPLE: SOMEWHAT DIFFICULT
SUM OF ALL RESPONSES TO PHQ QUESTIONS 1-9: 6

## 2024-12-10 NOTE — LETTER
December 11, 2024      Nadeem Paulino  [address] [facility] [address]        Dear ,    We are writing to inform you of your test results.    Your A1c is stable.  Your urine microalbumin is negative.  Your urine tox screen is as expected.    Resulted Orders   Albumin Random Urine Quantitative with Creat Ratio   Result Value Ref Range    Creatinine Urine mg/dL 151.0 mg/dL      Comment:      The reference ranges have not been established in urine creatinine. The results should be integrated into the clinical context for interpretation.    Albumin Urine mg/L 17.4 mg/L      Comment:      The reference ranges have not been established in urine albumin. The results should be integrated into the clinical context for interpretation.    Albumin Urine mg/g Cr 11.52 0.00 - 17.00 mg/g Cr      Comment:      Microalbuminuria is defined as an albumin:creatinine ratio of 17 to 299 for males and 25 to 299 for females. A ratio of albumin:creatinine of 300 or higher is indicative of overt proteinuria.  Due to biologic variability, positive results should be confirmed by a second, first-morning random or 24-hour timed urine specimen. If there is discrepancy, a third specimen is recommended. When 2 out of 3 results are in the microalbuminuria range, this is evidence for incipient nephropathy and warrants increased efforts at glucose control, blood pressure control, and institution of therapy with an angiotensin-converting-enzyme (ACE) inhibitor (if the patient can tolerate it).     HEMOGLOBIN A1C   Result Value Ref Range    Estimated Average Glucose 157 (H) <117 mg/dL    Hemoglobin A1C 7.1 (H) 0.0 - 5.6 %      Comment:      Normal <5.7%   Prediabetes 5.7-6.4%    Diabetes 6.5% or higher     Note: Adopted from ADA consensus guidelines.   Urine Drug Screen Clinic   Result Value Ref Range    Cannabinoids (45-pli-8-carboxy-9-THC) Not Detected Not Detected, Indeterminate      Comment:      Cutoff for a negative cannabinoid is  50 ng/mL or less.    Phencyclidine Not Detected Not Detected, Indeterminate      Comment:      Cutoff for a negative PCP is 25 ng/mL or less.    Cocaine (Benzoylecgonine) Not Detected Not Detected, Indeterminate      Comment:      Cutoff for a negative cocaine is 150 ng/ml or less.    Methamphetamine (d-Methamphetamine) Not Detected Not Detected, Indeterminate      Comment:      Cutoff for a negative methamphetamine is 500 ng/ml or less.    Opiates (Morphine) Not Detected Not Detected, Indeterminate      Comment:      Cutoff for a negative opiate is 100 ng/ml or less.    Amphetamine (d-Amphetamine) Not Detected Not Detected, Indeterminate      Comment:      Cutoff for a negative amphetamine is 500 ng/mL or less.    Benzodiazepines (Nordiazepam) Not Detected Not Detected, Indeterminate      Comment:      Cutoff for a negative benzodiazepine is 150 ng/ml or less.    Tricyclic Antidepressants (Desipramine) Not Detected Not Detected, Indeterminate      Comment:      Cutoff for a negative tricyclic antidepressant is 300 ng/ml or less.    Methadone Not Detected Not Detected, Indeterminate      Comment:      Cutoff for a negative methadone is 200 ng/ml or less.    Barbiturates (Butalbital) Not Detected Not Detected, Indeterminate      Comment:      Cutoff for a negative barbituate is 200 ng/ml or less.    Oxycodone Not Detected Not Detected, Indeterminate      Comment:      Cutoff for a negative oxycodone is 100 ng/mL or less.    Buprenorphine Not Detected Not Detected, Indeterminate      Comment:      Cutoff for a negative buprenorphine is 10 ng/ml or less.       If you have any questions or concerns, please call the clinic at the number listed above.       Sincerely,      RILEY Villareal

## 2024-12-10 NOTE — PROGRESS NOTES
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)  (primary encounter diagnosis)  Comment: Stable  Plan: Albumin Random Urine Quantitative with Creat         Ratio, HEMOGLOBIN A1C        Labs pending    (F11.90) Chronic, continuous use of opioids  Comment: Due for urine tox  Plan: Urine Drug Screen Clinic        Screen today    (I25.10) Coronary artery calcification seen on CT scan  Comment: Due to see cardiology  Plan: Adult Cardiology Eval  Referral        Referral placed    (M54.9) Dorsalgia, unspecified  Comment: Stable  Plan: Urine Drug Screen Clinic        Does not need narcotics filled today    ((R21) Rash and nonspecific skin eruption  Comment: Skin dermatitis  Plan: triamcinolone (KENALOG) 0.1 % external cream        History arise limit time with water contact that shower and bath more tepid and minimal soap follow-up as needed          BMI  Estimated body mass index is 25.39 kg/m  as calculated from the following:    Height as of this encounter: 1.727 m (5' 8\").    Weight as of this encounter: 75.8 kg (167 lb).             Viji Silver is a 74 year old, presenting for the following health issues:  Derm Problem (Rash on chest /Cardiologist Referral /Labs )        12/10/2024     8:58 AM   Additional Questions   Roomed by KYLIE Zaman     Via the Health Maintenance questionnaire, the patient has reported the following services have been completed -Eye Exam: [facility] 2023-12-16, this information has been sent to the abstraction team.  For rash on his chest for the last few weeks.  He has tried some hydrocortisone  It is pruritic  No new contacts to laundry detergent personal care products etc.  He is due for some diabetic labs will be performed today he is also due for urine tox    History of Present Illness       Diabetes:   He presents for follow up of diabetes.    He is not checking blood glucose.        He is concerned about other.    He " "is not experiencing numbness or burning in feet, excessive thirst, blurry vision, weight changes or redness, sores or blisters on feet. The patient has had a diabetic eye exam in the last 12 months. Eye exam performed on dec tenth last. Location of last eye exam [facility].        Reason for visit:  Elva gtediabetes    He eats 2-3 servings of fruits and vegetables daily.He consumes 0 sweetened beverage(s) daily.He exercises with enough effort to increase his heart rate 10 to 19 minutes per day.  He exercises with enough effort to increase his heart rate 3 or less days per week.   He is taking medications regularly.                     Objective    /72   Pulse 67   Temp 98.4  F (36.9  C) (Oral)   Resp 16   Ht 1.727 m (5' 8\")   Wt 75.8 kg (167 lb)   SpO2 99%   BMI 25.39 kg/m    Body mass index is 25.39 kg/m .  Physical Exam is an erythematous macular papular eruption with some excoriated areas to the anterior chest.  No pustules or vesicles              Signed Electronically by: RILEY Reyes    "

## 2024-12-18 ENCOUNTER — OFFICE VISIT (OUTPATIENT)
Dept: CARDIOLOGY | Facility: CLINIC | Age: 74
End: 2024-12-18
Attending: PHYSICIAN ASSISTANT
Payer: MEDICARE

## 2024-12-18 VITALS
SYSTOLIC BLOOD PRESSURE: 130 MMHG | BODY MASS INDEX: 25.61 KG/M2 | RESPIRATION RATE: 16 BRPM | DIASTOLIC BLOOD PRESSURE: 67 MMHG | WEIGHT: 169 LBS | HEIGHT: 68 IN | HEART RATE: 79 BPM

## 2024-12-18 DIAGNOSIS — I25.118 CORONARY ARTERY DISEASE OF NATIVE ARTERY OF NATIVE HEART WITH STABLE ANGINA PECTORIS (H): Primary | ICD-10-CM

## 2024-12-18 DIAGNOSIS — R06.09 DOE (DYSPNEA ON EXERTION): ICD-10-CM

## 2024-12-18 DIAGNOSIS — I25.10 CORONARY ARTERY CALCIFICATION SEEN ON CT SCAN: ICD-10-CM

## 2024-12-18 DIAGNOSIS — E11.9 TYPE 2 DIABETES MELLITUS WITHOUT COMPLICATION, WITHOUT LONG-TERM CURRENT USE OF INSULIN (H): ICD-10-CM

## 2024-12-18 DIAGNOSIS — L98.9 SKIN LESION: Primary | ICD-10-CM

## 2024-12-18 PROCEDURE — G2211 COMPLEX E/M VISIT ADD ON: HCPCS | Performed by: INTERNAL MEDICINE

## 2024-12-18 PROCEDURE — 99214 OFFICE O/P EST MOD 30 MIN: CPT | Performed by: INTERNAL MEDICINE

## 2024-12-18 NOTE — PROGRESS NOTES
[facility]   [address]  [place] [address]   [email or web address]   OFFICE: [phone]     CARDIOLOGY CLINIC NOTE     Thank you, Meek Mcgill, for asking the [facility] team to see Mr. Nadeem Paluino to evaluate Follow Up   .      Assessment/Recommendations   Assessment:    Coronary artery disease - reports Has high risk cardiac calcium score with a reassuring exercise stress echo in early 2023. Now having decreased exercise tolerance again with dyspnea on exertion.  DMII - controlled with metformin.   Nicotine use - uses snuff. Working on quitting.    Plan:  Exercise NM stress test.   Continue medications without changes.  Follow up in a year.    The longitudinal plan of care for the diagnosis(es)/condition(s) as documented were addressed during this visit. Due to the added complexity in care, I will continue to support Nadeem in the subsequent management and with ongoing continuity of care.         History of Present Illness   Mr. Nadeem Paulino is a 74 year old male with a significant past history of DMII who presents for evaluation of an abnormal cardiac calcium score.     Lt. Col. Paulino recently had a CT cardiac calcium score to evaluate for coronary artery disease.  This resulted in a calcium score of 508 which is in the 72nd percentile, with most of the calcium located in the LAD coronary artery.  An exercise stress echo was performed and he lasted 10 minutes on a Savage protocol with no evidence of ischemia.    He reports increased RITTER and decreased exertional tolerance. No overt chest pain. Otherwise feeling well.     Other than noted above, Mr. Paulino denies any chest pain/pressure/tightness, shortness of breath at rest or with exertion, light headedness/dizziness, pre-syncope, syncope, lower extremity swelling, palpitations, paroxysmal nocturnal dyspnea (PND), or orthopnea.     Cardiac Problems and Cardiac Diagnostics      Most Recent Cardiac testing:    Exercise stress echo 2/8/23  Interpretation Summary  1. Normal stress echocardiogram without evidence of stress induced ischemia.  2. Normal resting LV systolic performance with an ejection fraction of 55-60%. There is normal improvement in left ventricular systolic performance with a peak ejection fraction of 70-75%.  3. The ECG portion of this study is abnormal and suggestive of ischemia. Specifically, with exercise there is 3-4 mm of downsloping ST depression in the inferior leads and to a lesser degree anterolateral leads. The specificity of the ECG findings, however, are felt reduced due to considerable baseline artifact during exercise, the prompt normalization of ST depression in the recovery phase, and in the setting of normal echocardiographic images  4. No anginal chest pain reported with exercise.  5. Good functional capacity for age.     CT cardiac calcium score 10/26/22  The total Agatston score is 508. A calcium score in this range places the individual in the 72nd percentile when compared to an age and gender matched control group.  LM - 0  LAD - 396  Cx - 33  RCA - 79         Medications  Allergies   Current Outpatient Medications   Medication Sig Dispense Refill    alprostadil (EDEX) 20 MCG kit 20 mcg by Intracavitary route as needed for erectile dysfunction use no more than 3 times per week 2 kit 1    alprostadil (EDEX) 20 MCG kit 20 mcg by Intracavitary route as needed for erectile dysfunction (inject 5min before intercourse.  May use once daily and up to 3x/week.) use no more than 3 times per week 2 kit 11    ANUCORT-HC 25 MG suppository       aspirin (ASA) 81 MG EC tablet 81 mg daily      biotin 300 MCG TABS tablet Take 300 mcg by mouth daily      buPROPion (WELLBUTRIN XL) 150 MG 24 hr tablet Take 1 tablet (150 mg) by mouth every morning 90 tablet 3    diphenoxylate-atropine (LOMOTIL) 2.5-0.025 MG tablet Take 1 tablet by mouth 4 times daily as needed for  "diarrhea 120 tablet 5    dutasteride (AVODART) 0.5 MG capsule TAKE 1 CAPSULE(0.5 MG) BY MOUTH DAILY 90 capsule 2    glimepiride (AMARYL) 2 MG tablet TAKE 2 TABLETS(4 MG) BY MOUTH EVERY MORNING BEFORE BREAKFAST 180 tablet 1    hydrocortisone 2.5 % ointment Apply topically 2 times daily. 30 g 1    ibuprofen (ADVIL/MOTRIN) 600 MG tablet Take 600 mg by mouth as needed      lidocaine (XYLOCAINE) 5 % external ointment Apply topically 3 times daily as needed for moderate pain (anal pruritis) 15 g 3    metFORMIN (GLUCOPHAGE XR) 500 MG 24 hr tablet TAKE 4 TABLETS(2000 MG) BY MOUTH DAILY 360 tablet 1    modafinil (PROVIGIL) 200 MG tablet Take 1 tablet (200 mg) by mouth every morning 30 tablet 5    oxyCODONE-acetaminophen (PERCOCET) 5-325 MG tablet Take 1 tablet by mouth every 6 hours as needed for pain or severe pain 30 tablet 0    rosuvastatin (CRESTOR) 5 MG tablet Take 1 tablet (5 mg) by mouth daily 90 tablet 3    tadalafil (ADCIRCA/CIALIS) 20 MG tablet Take 1 tablet (20 mg) by mouth daily as needed (prior to sexual activity) 30 tablet 1    tiZANidine (ZANAFLEX) 4 MG tablet   See Instructions, Instructions: 0.5-2 tab(s) Oral q8 hrs as needed, # 30 tab(s), 1 Refill(s), Type: Maintenance, Pharmacy: [facility] #[id number], 0.5-2 tab(s) Oral q8 hrs as needed, 159, lb, 05/08/23 14:41:00 CDT, Weight Measured      triamcinolone (KENALOG) 0.1 % external cream Apply topically 2 times daily. 30 g 0      No Known Allergies     Physical Examination Review of Systems   Vitals: /67 (BP Location: Left arm, Patient Position: Sitting, Cuff Size: Adult Regular)   Pulse 79   Resp 16   Ht 1.727 m (5' 8\")   Wt 76.7 kg (169 lb)   BMI 25.70 kg/m    BMI= Body mass index is 25.7 kg/m .  Wt Readings from Last 3 Encounters:   12/18/24 76.7 kg (169 lb)   12/10/24 75.8 kg (167 lb)   07/03/24 75.7 kg (166 lb 14.4 oz)       General: pleasant male. No acute distress.   Neck: No JVD  Lungs: clear to auscultation  COR: normal rate, regular " rhythm, No murmurs, rubs, or gallops  Extrem: No edema        Please refer above for cardiac ROS details.       Past History     Past Medical History  Past Medical History:   Diagnosis Date    DMII (diabetes mellitus, type 2) (H)     Mumps     Prostate infection     STD (sexually transmitted disease)     Swelling of testicle        Past Surgical History  Past Surgical History:   Procedure Laterality Date    COLONOSCOPY  01/27/2009    COLONOSCOPY  11/21/2017    Negative for microscopic colitis    CT CALCIUM SCREENING  07/02/2018    Total Agatston calcium score is 393    HEMICOLECTOMY, RT/LT  06/26/2014    MRI BIOPSY PROSTATE      2008    REPAIR TENDON ANKLE  02/13/2002    WISDOM TOOTH EXTRACTION      1971       Family History:   Family History   Problem Relation Age of Onset    Heart Disease Mother     Cerebrovascular Disease Mother     Heart Surgery Father         Social History:   Social History     Socioeconomic History    Marital status: Single     Spouse name: Not on file    Number of children: Not on file    Years of education: Not on file    Highest education level: Not on file   Occupational History    Not on file   Tobacco Use    Smoking status: Never     Passive exposure: Never    Smokeless tobacco: Current     Types: Chew   Vaping Use    Vaping status: Never Used   Substance and Sexual Activity    Alcohol use: Not on file    Drug use: Not on file    Sexual activity: Not on file   Other Topics Concern    Not on file   Social History Narrative    Not on file     Social Drivers of Health     Financial Resource Strain: Low Risk  (7/3/2024)    Financial Resource Strain     Within the past 12 months, have you or your family members you live with been unable to get utilities (heat, electricity) when it was really needed?: No   Food Insecurity: Low Risk  (7/3/2024)    Food Insecurity     Within the past 12 months, did you worry that your food would run out before you got money to buy more?: No     Within the past  12 months, did the food you bought just not last and you didn t have money to get more?: No   Transportation Needs: Low Risk  (7/3/2024)    Transportation Needs     Within the past 12 months, has lack of transportation kept you from medical appointments, getting your medicines, non-medical meetings or appointments, work, or from getting things that you need?: No   Physical Activity: Insufficiently Active (7/3/2024)    Exercise Vital Sign     Days of Exercise per Week: 2 days     Minutes of Exercise per Session: 20 min   Stress: No Stress Concern Present (7/3/2024)    [employer] - Occupational Stress Questionnaire     Feeling of Stress : Only a little   Social Connections: Unknown (7/3/2024)    Social Connection and Isolation Panel [NHANES]     Frequency of Communication with Friends and Family: Not on file     Frequency of Social Gatherings with Friends and Family: Twice a week     Attends Mandaen Services: Not on file     Active Member of Clubs or Organizations: Not on file     Attends Club or Organization Meetings: Not on file     Marital Status: Not on file   Interpersonal Safety: Low Risk  (7/3/2024)    Interpersonal Safety     Do you feel physically and emotionally safe where you currently live?: Yes     Within the past 12 months, have you been hit, slapped, kicked or otherwise physically hurt by someone?: No     Within the past 12 months, have you been humiliated or emotionally abused in other ways by your partner or ex-partner?: No   Housing Stability: Low Risk  (7/3/2024)    Housing Stability     Do you have housing? : Yes     Are you worried about losing your housing?: No            Lab Results    Chemistry/lipid CBC Cardiac Enzymes/BNP/TSH/INR   Lab Results   Component Value Date    CHOL 109 07/03/2024    HDL 46 07/03/2024    TRIG 94 07/03/2024    BUN 13.7 07/03/2024     07/03/2024    CO2 29 07/03/2024    Lab Results   Component Value Date    WBC 4.9 09/02/2022    HGB  13.8 09/02/2022    HCT 41.6 09/02/2022    MCV 94 09/02/2022     09/02/2022    Lab Results   Component Value Date    TSH 1.25 09/02/2022

## 2024-12-18 NOTE — PATIENT INSTRUCTIONS
It was a pleasure to meet with you today.      Below is a summary of your visit.   Schedule a stress test to see if your LAD plaque is obstructing blood flow.  Continue your medications without changes.  Follow up in a year.    We will call you to inform you of your test or procedure results within 3 business days of the test being performed.  If you do not hear from our office with the test results within 1 week please do not hesitate to call asking for these results.     Please do not hesitate to call the [facility] with any questions or concerns at [phone]. You can also reach my nurse, Viki, at [phone].    Sincerely,

## 2024-12-18 NOTE — LETTER
12/18/2024    Meek Serna, [place]  [address] [address]    RE: Nadeem Paulino       Dear Colleague,     I had the pleasure of seeing Nadeem Paulino in the [facility].    [facility]   [address]  [place] [address]   [email or web address]   OFFICE: [phone]     CARDIOLOGY CLINIC NOTE     Thank you, Meek Mcgill, for asking the [facility] team to see Mr. Nadeem Paulino to evaluate Follow Up   .      Assessment/Recommendations   Assessment:    Coronary artery disease - reports Has high risk cardiac calcium score with a reassuring exercise stress echo in early 2023. Now having decreased exercise tolerance again with dyspnea on exertion.  DMII - controlled with metformin.   Nicotine use - uses snuff. Working on quitting.    Plan:  Exercise NM stress test.   Continue medications without changes.  Follow up in a year.    The longitudinal plan of care for the diagnosis(es)/condition(s) as documented were addressed during this visit. Due to the added complexity in care, I will continue to support Nadeem in the subsequent management and with ongoing continuity of care.         History of Present Illness   Mr. Nadeem Paulino is a 74 year old male with a significant past history of DMII who presents for evaluation of an abnormal cardiac calcium score.     Lt. Col. Paulino recently had a CT cardiac calcium score to evaluate for coronary artery disease.  This resulted in a calcium score of 508 which is in the 72nd percentile, with most of the calcium located in the LAD coronary artery.  An exercise stress echo was performed and he lasted 10 minutes on a Savage protocol with no evidence of ischemia.    He reports increased RITTER and decreased exertional tolerance. No overt chest pain. Otherwise feeling well.     Other than noted above, Mr. Paulino denies any chest pain/pressure/tightness, shortness of breath at  rest or with exertion, light headedness/dizziness, pre-syncope, syncope, lower extremity swelling, palpitations, paroxysmal nocturnal dyspnea (PND), or orthopnea.     Cardiac Problems and Cardiac Diagnostics     Most Recent Cardiac testing:    Exercise stress echo 2/8/23  Interpretation Summary  1. Normal stress echocardiogram without evidence of stress induced ischemia.  2. Normal resting LV systolic performance with an ejection fraction of 55-60%. There is normal improvement in left ventricular systolic performance with a peak ejection fraction of 70-75%.  3. The ECG portion of this study is abnormal and suggestive of ischemia. Specifically, with exercise there is 3-4 mm of downsloping ST depression in the inferior leads and to a lesser degree anterolateral leads. The specificity of the ECG findings, however, are felt reduced due to considerable baseline artifact during exercise, the prompt normalization of ST depression in the recovery phase, and in the setting of normal echocardiographic images  4. No anginal chest pain reported with exercise.  5. Good functional capacity for age.     CT cardiac calcium score 10/26/22  The total Agatston score is 508. A calcium score in this range places the individual in the 72nd percentile when compared to an age and gender matched control group.  LM - 0  LAD - 396  Cx - 33  RCA - 79         Medications  Allergies   Current Outpatient Medications   Medication Sig Dispense Refill     alprostadil (EDEX) 20 MCG kit 20 mcg by Intracavitary route as needed for erectile dysfunction use no more than 3 times per week 2 kit 1     alprostadil (EDEX) 20 MCG kit 20 mcg by Intracavitary route as needed for erectile dysfunction (inject 5min before intercourse.  May use once daily and up to 3x/week.) use no more than 3 times per week 2 kit 11     ANUCORT-HC 25 MG suppository        aspirin (ASA) 81 MG EC tablet 81 mg daily       biotin 300 MCG TABS tablet Take 300 mcg by mouth daily        "buPROPion (WELLBUTRIN XL) 150 MG 24 hr tablet Take 1 tablet (150 mg) by mouth every morning 90 tablet 3     diphenoxylate-atropine (LOMOTIL) 2.5-0.025 MG tablet Take 1 tablet by mouth 4 times daily as needed for diarrhea 120 tablet 5     dutasteride (AVODART) 0.5 MG capsule TAKE 1 CAPSULE(0.5 MG) BY MOUTH DAILY 90 capsule 2     glimepiride (AMARYL) 2 MG tablet TAKE 2 TABLETS(4 MG) BY MOUTH EVERY MORNING BEFORE BREAKFAST 180 tablet 1     hydrocortisone 2.5 % ointment Apply topically 2 times daily. 30 g 1     ibuprofen (ADVIL/MOTRIN) 600 MG tablet Take 600 mg by mouth as needed       lidocaine (XYLOCAINE) 5 % external ointment Apply topically 3 times daily as needed for moderate pain (anal pruritis) 15 g 3     metFORMIN (GLUCOPHAGE XR) 500 MG 24 hr tablet TAKE 4 TABLETS(2000 MG) BY MOUTH DAILY 360 tablet 1     modafinil (PROVIGIL) 200 MG tablet Take 1 tablet (200 mg) by mouth every morning 30 tablet 5     oxyCODONE-acetaminophen (PERCOCET) 5-325 MG tablet Take 1 tablet by mouth every 6 hours as needed for pain or severe pain 30 tablet 0     rosuvastatin (CRESTOR) 5 MG tablet Take 1 tablet (5 mg) by mouth daily 90 tablet 3     tadalafil (ADCIRCA/CIALIS) 20 MG tablet Take 1 tablet (20 mg) by mouth daily as needed (prior to sexual activity) 30 tablet 1     tiZANidine (ZANAFLEX) 4 MG tablet   See Instructions, Instructions: 0.5-2 tab(s) Oral q8 hrs as needed, # 30 tab(s), 1 Refill(s), Type: Maintenance, Pharmacy: [facility] #[id number], 0.5-2 tab(s) Oral q8 hrs as needed, 159, lb, 05/08/23 14:41:00 CDT, Weight Measured       triamcinolone (KENALOG) 0.1 % external cream Apply topically 2 times daily. 30 g 0      No Known Allergies     Physical Examination Review of Systems   Vitals: /67 (BP Location: Left arm, Patient Position: Sitting, Cuff Size: Adult Regular)   Pulse 79   Resp 16   Ht 1.727 m (5' 8\")   Wt 76.7 kg (169 lb)   BMI 25.70 kg/m    BMI= Body mass index is 25.7 kg/m .  Wt Readings from Last 3 " Encounters:   12/18/24 76.7 kg (169 lb)   12/10/24 75.8 kg (167 lb)   07/03/24 75.7 kg (166 lb 14.4 oz)       General: pleasant male. No acute distress.   Neck: No JVD  Lungs: clear to auscultation  COR: normal rate, regular rhythm, No murmurs, rubs, or gallops  Extrem: No edema        Please refer above for cardiac ROS details.       Past History     Past Medical History  Past Medical History:   Diagnosis Date     DMII (diabetes mellitus, type 2) (H)      Mumps      Prostate infection      STD (sexually transmitted disease)      Swelling of testicle        Past Surgical History  Past Surgical History:   Procedure Laterality Date     COLONOSCOPY  01/27/2009     COLONOSCOPY  11/21/2017    Negative for microscopic colitis     CT CALCIUM SCREENING  07/02/2018    Total Agatston calcium score is 393     HEMICOLECTOMY, RT/LT  06/26/2014     MRI BIOPSY PROSTATE      2008     REPAIR TENDON ANKLE  02/13/2002     WISDOM TOOTH EXTRACTION      1971       Family History:   Family History   Problem Relation Age of Onset     Heart Disease Mother      Cerebrovascular Disease Mother      Heart Surgery Father         Social History:   Social History     Socioeconomic History     Marital status: Single     Spouse name: Not on file     Number of children: Not on file     Years of education: Not on file     Highest education level: Not on file   Occupational History     Not on file   Tobacco Use     Smoking status: Never     Passive exposure: Never     Smokeless tobacco: Current     Types: Chew   Vaping Use     Vaping status: Never Used   Substance and Sexual Activity     Alcohol use: Not on file     Drug use: Not on file     Sexual activity: Not on file   Other Topics Concern     Not on file   Social History Narrative     Not on file     Social Drivers of Health     Financial Resource Strain: Low Risk  (7/3/2024)    Financial Resource Strain      Within the past 12 months, have you or your family members you live with been unable to  get utilities (heat, electricity) when it was really needed?: No   Food Insecurity: Low Risk  (7/3/2024)    Food Insecurity      Within the past 12 months, did you worry that your food would run out before you got money to buy more?: No      Within the past 12 months, did the food you bought just not last and you didn t have money to get more?: No   Transportation Needs: Low Risk  (7/3/2024)    Transportation Needs      Within the past 12 months, has lack of transportation kept you from medical appointments, getting your medicines, non-medical meetings or appointments, work, or from getting things that you need?: No   Physical Activity: Insufficiently Active (7/3/2024)    Exercise Vital Sign      Days of Exercise per Week: 2 days      Minutes of Exercise per Session: 20 min   Stress: No Stress Concern Present (7/3/2024)    [employer] - Occupational Stress Questionnaire      Feeling of Stress : Only a little   Social Connections: Unknown (7/3/2024)    Social Connection and Isolation Panel [NHANES]      Frequency of Communication with Friends and Family: Not on file      Frequency of Social Gatherings with Friends and Family: Twice a week      Attends Buddhist Services: Not on file      Active Member of Clubs or Organizations: Not on file      Attends Club or Organization Meetings: Not on file      Marital Status: Not on file   Interpersonal Safety: Low Risk  (7/3/2024)    Interpersonal Safety      Do you feel physically and emotionally safe where you currently live?: Yes      Within the past 12 months, have you been hit, slapped, kicked or otherwise physically hurt by someone?: No      Within the past 12 months, have you been humiliated or emotionally abused in other ways by your partner or ex-partner?: No   Housing Stability: Low Risk  (7/3/2024)    Housing Stability      Do you have housing? : Yes      Are you worried about losing your housing?: No            Lab Results     Chemistry/lipid CBC Cardiac Enzymes/BNP/TSH/INR   Lab Results   Component Value Date    CHOL 109 07/03/2024    HDL 46 07/03/2024    TRIG 94 07/03/2024    BUN 13.7 07/03/2024     07/03/2024    CO2 29 07/03/2024    Lab Results   Component Value Date    WBC 4.9 09/02/2022    HGB 13.8 09/02/2022    HCT 41.6 09/02/2022    MCV 94 09/02/2022     09/02/2022    Lab Results   Component Value Date    TSH 1.25 09/02/2022                Thank you for allowing me to participate in the care of your patient.      Sincerely,     Micheal Cotter MD     [facility]  cc:   RILEY Villareal  [address] [place] [id number]

## 2025-01-06 DIAGNOSIS — R19.7 DIARRHEA, UNSPECIFIED TYPE: ICD-10-CM

## 2025-01-06 RX ORDER — DIPHENOXYLATE HYDROCHLORIDE AND ATROPINE SULFATE 2.5; .025 MG/1; MG/1
1 TABLET ORAL 4 TIMES DAILY PRN
Qty: 120 TABLET | Refills: 5 | Status: SHIPPED | OUTPATIENT
Start: 2025-01-06

## 2025-01-10 ENCOUNTER — TRANSFERRED RECORDS (OUTPATIENT)
Dept: HEALTH INFORMATION MANAGEMENT | Facility: CLINIC | Age: 75
End: 2025-01-10
Payer: MEDICARE

## 2025-01-28 DIAGNOSIS — N40.0 BENIGN PROSTATIC HYPERPLASIA, UNSPECIFIED WHETHER LOWER URINARY TRACT SYMPTOMS PRESENT: ICD-10-CM

## 2025-01-28 RX ORDER — DUTASTERIDE 0.5 MG/1
CAPSULE, LIQUID FILLED ORAL
Qty: 90 CAPSULE | Refills: 2 | Status: SHIPPED | OUTPATIENT
Start: 2025-01-28

## 2025-01-28 NOTE — TELEPHONE ENCOUNTER
Medication Question or Refill    Contacts       Contact Date/Time Type Contact Phone/Fax    01/28/2025 03:23 PM CST Interface (Incoming) [id number] [address] AT [address] [facility] [address]  MM (Pharmacy) [phone]    01/28/2025 03:38 PM CST Phone (Incoming) Nadeem Paulino (Self) [phone] (M)        What medication are you calling about (include dose and sig)?:      dutasteride (AVODART) 0.5 MG capsule     Preferred Pharmacy:   [facility] #[id number] - [facility] [address] AT [address] [facility] [address] [facility]  [address] [address]  Phone: [phone] Fax: [phone]    Controlled Substance Agreement on file:   CSA -- Patient Level:    CSA: None found at the patient level.       Who prescribed the medication?: Meek Serna, PA     Do you need a refill? Yes    Do you have any questions or concerns?  Yes: Only has 3 pills left. Pt Initiated process of refilling medication with [facility] on Sunday 1/26 and it was just sent over here to the clinic today. Pt is going out of town for a week tomorrow.     Okay to leave a detailed message?: Yes at Home number on file [phone] (home)

## 2025-02-17 ENCOUNTER — HOSPITAL ENCOUNTER (OUTPATIENT)
Dept: CARDIOLOGY | Facility: CLINIC | Age: 75
Discharge: HOME | End: 2025-02-17
Attending: INTERNAL MEDICINE
Payer: MEDICARE

## 2025-02-17 ENCOUNTER — HOSPITAL ENCOUNTER (OUTPATIENT)
Dept: NUCLEAR MEDICINE | Facility: CLINIC | Age: 75
Discharge: HOME | End: 2025-02-17
Attending: INTERNAL MEDICINE
Payer: MEDICARE

## 2025-02-17 DIAGNOSIS — R06.09 DOE (DYSPNEA ON EXERTION): ICD-10-CM

## 2025-02-17 DIAGNOSIS — I25.118 CORONARY ARTERY DISEASE OF NATIVE ARTERY OF NATIVE HEART WITH STABLE ANGINA PECTORIS: ICD-10-CM

## 2025-02-17 LAB
CV STRESS CURRENT BP HE: NORMAL
CV STRESS CURRENT HR HE: 106
CV STRESS CURRENT HR HE: 106
CV STRESS CURRENT HR HE: 108
CV STRESS CURRENT HR HE: 110
CV STRESS CURRENT HR HE: 110
CV STRESS CURRENT HR HE: 115
CV STRESS CURRENT HR HE: 128
CV STRESS CURRENT HR HE: 131
CV STRESS CURRENT HR HE: 133
CV STRESS CURRENT HR HE: 138
CV STRESS CURRENT HR HE: 142
CV STRESS CURRENT HR HE: 143
CV STRESS CURRENT HR HE: 143
CV STRESS CURRENT HR HE: 145
CV STRESS CURRENT HR HE: 145
CV STRESS CURRENT HR HE: 68
CV STRESS CURRENT HR HE: 77
CV STRESS CURRENT HR HE: 78
CV STRESS CURRENT HR HE: 81
CV STRESS CURRENT HR HE: 82
CV STRESS CURRENT HR HE: 84
CV STRESS CURRENT HR HE: 84
CV STRESS CURRENT HR HE: 90
CV STRESS CURRENT HR HE: 94
CV STRESS CURRENT HR HE: 95
CV STRESS CURRENT HR HE: 95
CV STRESS CURRENT HR HE: 96
CV STRESS CURRENT HR HE: 96
CV STRESS DEVIATION TIME HE: NORMAL
CV STRESS ECHO PERCENT HR HE: NORMAL
CV STRESS EXERCISE STAGE HE: NORMAL
CV STRESS FINAL RESTING BP HE: NORMAL
CV STRESS FINAL RESTING HR HE: 77
CV STRESS MAX HR HE: 145
CV STRESS MAX TREADMILL GRADE HE: 10
CV STRESS MAX TREADMILL SPEED HE: 2
CV STRESS PEAK DIA BP HE: NORMAL
CV STRESS PEAK SYS BP HE: NORMAL
CV STRESS PHASE HE: NORMAL
CV STRESS PROTOCOL HE: NORMAL
CV STRESS RESTING PT POSITION HE: NORMAL
CV STRESS RESTING PT POSITION HE: NORMAL
CV STRESS ST DEVIATION AMOUNT HE: NORMAL
CV STRESS ST DEVIATION ELEVATION HE: NORMAL
CV STRESS ST EVELATION AMOUNT HE: NORMAL
CV STRESS TEST TYPE HE: NORMAL
CV STRESS TOTAL STAGE TIME MIN 1 HE: NORMAL
NUC STRESS EJECTION FRACTION: 66 %
RATE PRESSURE PRODUCT: NORMAL
STRESS ECHO BASELINE HR: 67
STRESS ECHO CALCULATED PERCENT HR: 99 %
STRESS ECHO LAST STRESS DIASTOLIC BP: 62
STRESS ECHO LAST STRESS HR: 143
STRESS ECHO LAST STRESS SYSTOLIC BP: 138
STRESS ECHO POST ESTIMATED WORKLOAD: 10.3
STRESS ECHO POST EXERCISE DUR MIN: 8
STRESS ECHO POST EXERCISE DUR SEC: 30
STRESS ECHO TARGET HR: 147

## 2025-02-17 PROCEDURE — A9500 TC99M SESTAMIBI: HCPCS | Performed by: INTERNAL MEDICINE

## 2025-02-17 PROCEDURE — 93018 CV STRESS TEST I&R ONLY: CPT | Performed by: INTERNAL MEDICINE

## 2025-02-17 PROCEDURE — 343N000001 HC RX 343 MED OP 636: Performed by: INTERNAL MEDICINE

## 2025-02-17 PROCEDURE — 93017 CV STRESS TEST TRACING ONLY: CPT

## 2025-02-17 PROCEDURE — 78452 HT MUSCLE IMAGE SPECT MULT: CPT | Mod: 26 | Performed by: INTERNAL MEDICINE

## 2025-02-17 PROCEDURE — 78452 HT MUSCLE IMAGE SPECT MULT: CPT

## 2025-02-17 PROCEDURE — 93016 CV STRESS TEST SUPVJ ONLY: CPT | Performed by: INTERNAL MEDICINE

## 2025-02-17 RX ADMIN — Medication 8.6 MILLICURIE: at 09:30

## 2025-02-17 RX ADMIN — Medication 31.2 MILLICURIE: at 10:40

## 2025-02-26 ENCOUNTER — OFFICE VISIT (OUTPATIENT)
Dept: FAMILY MEDICINE | Facility: CLINIC | Age: 75
End: 2025-02-26
Payer: MEDICARE

## 2025-02-26 VITALS
DIASTOLIC BLOOD PRESSURE: 76 MMHG | HEART RATE: 71 BPM | WEIGHT: 166.9 LBS | RESPIRATION RATE: 16 BRPM | TEMPERATURE: 97.2 F | BODY MASS INDEX: 25.29 KG/M2 | SYSTOLIC BLOOD PRESSURE: 120 MMHG | HEIGHT: 68 IN | OXYGEN SATURATION: 97 %

## 2025-02-26 DIAGNOSIS — E11.69 DISORDER OF NERVOUS SYSTEM DUE TO TYPE 2 DIABETES MELLITUS (H): Primary | ICD-10-CM

## 2025-02-26 DIAGNOSIS — F33.1 MODERATE RECURRENT MAJOR DEPRESSION (H): ICD-10-CM

## 2025-02-26 DIAGNOSIS — R21 RASH AND NONSPECIFIC SKIN ERUPTION: ICD-10-CM

## 2025-02-26 DIAGNOSIS — G98.8 DISORDER OF NERVOUS SYSTEM DUE TO TYPE 2 DIABETES MELLITUS (H): Primary | ICD-10-CM

## 2025-02-26 DIAGNOSIS — N40.0 BENIGN PROSTATIC HYPERPLASIA, UNSPECIFIED WHETHER LOWER URINARY TRACT SYMPTOMS PRESENT: ICD-10-CM

## 2025-02-26 DIAGNOSIS — E11.9 TYPE 2 DIABETES MELLITUS WITHOUT COMPLICATION, WITHOUT LONG-TERM CURRENT USE OF INSULIN (H): ICD-10-CM

## 2025-02-26 DIAGNOSIS — N52.9 ERECTILE DYSFUNCTION, UNSPECIFIED ERECTILE DYSFUNCTION TYPE: ICD-10-CM

## 2025-02-26 DIAGNOSIS — K64.8 INTERNAL HEMORRHOIDS WITHOUT COMPLICATION: ICD-10-CM

## 2025-02-26 DIAGNOSIS — L57.0 AK (ACTINIC KERATOSIS): ICD-10-CM

## 2025-02-26 DIAGNOSIS — I25.10 CORONARY ARTERY CALCIFICATION SEEN ON CT SCAN: ICD-10-CM

## 2025-02-26 PROCEDURE — 99214 OFFICE O/P EST MOD 30 MIN: CPT | Mod: 25 | Performed by: PHYSICIAN ASSISTANT

## 2025-02-26 PROCEDURE — 3074F SYST BP LT 130 MM HG: CPT | Performed by: PHYSICIAN ASSISTANT

## 2025-02-26 PROCEDURE — 3078F DIAST BP <80 MM HG: CPT | Performed by: PHYSICIAN ASSISTANT

## 2025-02-26 PROCEDURE — 17000 DESTRUCT PREMALG LESION: CPT | Performed by: PHYSICIAN ASSISTANT

## 2025-02-26 RX ORDER — TRIAMCINOLONE ACETONIDE 1 MG/G
CREAM TOPICAL 2 TIMES DAILY
Qty: 45 G | Refills: 1 | Status: SHIPPED | OUTPATIENT
Start: 2025-02-26

## 2025-02-26 RX ORDER — HYDROCORTISONE ACETATE 25 MG/1
25 SUPPOSITORY RECTAL 2 TIMES DAILY
Qty: 24 SUPPOSITORY | Refills: 2 | Status: SHIPPED | OUTPATIENT
Start: 2025-02-26

## 2025-02-26 NOTE — PROGRESS NOTES
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 Rodgers    (E11.9) Type 2 diabetes mellitus without complication, without long-term current use of insulin (H)  Comment: Excellent control  Plan: Continue current plan    (N40.0) Benign prostatic hyperplasia, unspecified whether lower urinary tract symptoms present  Comment: Stable  Plan: Adult Urology  Referral        Will see urology    (I25.10) Coronary artery calcification seen on CT scan  Comment: Has follow-up with Dr. Cotter  Plan: Follow-up promptly for chest pain RITTER etc.    (K64.8) Internal hemorrhoids without complication  Comment: Persistent  Plan: hydrocortisone (ANUSOL-HC) 25 MG suppository        Filled his Anusol    (R21) Rash and nonspecific skin eruption  Comment: On chest  Plan: triamcinolone (KENALOG) 0.1 % external cream,         Adult Dermatology  Referral        Refilled his triamcinolone    (L57.0) AK (actinic keratosis)  Comment: On the face  Plan: Cryotherapy today placed Derm referral          BMI  Estimated body mass index is 25.38 kg/m  as calculated from the following:    Height as of this encounter: 1.727 m (5' 8\").    Weight as of this encounter: 75.7 kg (166 lb 14.4 oz).             Viji Silver is a 74 year old, presenting for the following health issues:  Follow Up (Cardiology stress test results), Recheck Medication, Referral (Dr. Rodgers ED provider at [facility] ), and Derm Problem (Check spot on chin for the past couple of months )        2/26/2025     2:15 PM   Additional Questions   Roomed by IAN Rios     Via the Health Maintenance questionnaire, the patient has reported the following services have been completed -Eye Exam: ney " 2028-01-12, this information has been sent to the abstraction team.  Patient here to discuss multiple issues  Recently had a nuclear stress test with some areas of abnormality does have follow-up scheduled with cardiology for a few weeks  No current chest pain or RTITER  He recently started back at the gym  [facility]  His blood pressure has been well-controlled  Told him we need to make sure his lipids are well-controlled as well.  He has a skin lesion on the right side of his chin it has been there for the few weeks he has a history of skin cancers and precancerous lesions  He is requesting referral to dermatology  He has a history of chronic internal hemorrhoids is requesting refills Anusol suppositories  Rash on the anterior chest wall he requests refill of triamcinolone which works well  His mood has been worse as of late.  He states some of his close friends are not doing well healthwise and this is weighing on him he does not like to socialize too much other than his  shooting partners  He denies homicidal or suicidal ideation  Does not wish to change or adjust his bupropion dose  He does have a couple of shooting competitions coming up in the near future and he plans to spend 4 to 6 weeks in [place] later this summer  He would like referral back to Dr. Rodgers urology for his erectile dysfunction    History of Present Illness       Diabetes:   He presents for follow up of diabetes.    He is not checking blood glucose.        He is concerned about other.   He is having weight gain.  The patient has had a diabetic eye exam in the last 12 months. Eye exam performed on [facility]. Location of last eye exam [facility].    He consumes 0 sweetened beverage(s) daily.He exercises with enough effort to increase his heart rate 10 to 19 minutes per day.  He exercises with enough effort to increase his heart rate 3 or less days per week.   He is taking medications regularly.    "                  Objective    /76 (BP Location: Right arm, Patient Position: Sitting, Cuff Size: Adult Regular)   Pulse 71   Temp 97.2  F (36.2  C) (Tympanic)   Resp 16   Ht 1.727 m (5' 8\")   Wt 75.7 kg (166 lb 14.4 oz)   SpO2 97%   BMI 25.38 kg/m    Body mass index is 25.38 kg/m .  Physical Exam attentive no acute distress    Vitals are stable he is afebrile  Respirations unlabored  Cardiovascular regular in rate  The skin lesion is 5 mm it is erythematous slightly scaly suspicious for an actinic keratoses he has had numerous of these treated previously we elected to treat with liquid nitrogen verbal informed consent carried out risk of needing further treatment scarring pain infection elects to proceed it was treated with liquid nitrogen freeze thaw freeze cycle he tolerated well there were no complications              Signed Electronically by: RILEY Reyes    "

## 2025-03-12 ENCOUNTER — TRANSFERRED RECORDS (OUTPATIENT)
Dept: HEALTH INFORMATION MANAGEMENT | Facility: CLINIC | Age: 75
End: 2025-03-12
Payer: MEDICARE

## 2025-03-20 ENCOUNTER — TELEPHONE (OUTPATIENT)
Dept: FAMILY MEDICINE | Facility: CLINIC | Age: 75
End: 2025-03-20
Payer: MEDICARE

## 2025-03-20 DIAGNOSIS — R21 RASH AND NONSPECIFIC SKIN ERUPTION: Primary | ICD-10-CM

## 2025-03-20 RX ORDER — NEOMYCIN SULFATE, POLYMYXIN B SULFATE, HYDROCORTISONE 3.5; 10000; 1 MG/ML; [USP'U]/ML; MG/ML
3 SOLUTION/ DROPS AURICULAR (OTIC) 4 TIMES DAILY
Qty: 10 ML | Refills: 1 | Status: SHIPPED | OUTPATIENT
Start: 2025-03-20

## 2025-03-20 NOTE — TELEPHONE ENCOUNTER
New Medication Request    Contacts       Contact Date/Time Type Contact Phone/Fax    03/20/2025 10:46 AM CDT Phone (Incoming) Nadeem Paulino (Self) [phone] (H)            What medication are you requesting?: hydrocortisone/neomycin/polymyxin B 1%-0.35%-10,000 units/mL otic suspension     Reason for medication request: ear pain    Have you taken this medication before?: Yes: 9/3/2020    Controlled Substance Agreement on file:   CSA -- Patient Level:    CSA: None found at the patient level.         Patient offered an appointment? No    Preferred Pharmacy:     [id number] - [place], [place] - [address]  [address] [address]  Phone: [phone] Fax: [phone]    Okay to leave a detailed message?: Yes at Home number on file [phone] (home)

## 2025-04-07 ENCOUNTER — TRANSFERRED RECORDS (OUTPATIENT)
Dept: HEALTH INFORMATION MANAGEMENT | Facility: CLINIC | Age: 75
End: 2025-04-07
Payer: MEDICARE

## 2025-04-23 ENCOUNTER — TELEPHONE (OUTPATIENT)
Dept: FAMILY MEDICINE | Facility: CLINIC | Age: 75
End: 2025-04-23
Payer: MEDICARE

## 2025-04-23 DIAGNOSIS — R19.7 DIARRHEA, UNSPECIFIED TYPE: ICD-10-CM

## 2025-04-23 DIAGNOSIS — R19.7 DIARRHEA, UNSPECIFIED TYPE: Primary | ICD-10-CM

## 2025-04-23 RX ORDER — DIPHENOXYLATE HYDROCHLORIDE AND ATROPINE SULFATE 2.5; .025 MG/1; MG/1
TABLET ORAL
Qty: 180 TABLET | Refills: 5 | Status: SHIPPED | OUTPATIENT
Start: 2025-04-23

## 2025-04-23 NOTE — TELEPHONE ENCOUNTER
"General Call      Reason for Call:  diphenoxylate-atropine (LOMOTIL) 2.5-0.025 MG tablet     What are your questions or concerns:  Patient states he was informed by his pharmacy that he is directed to take this medication 4 times daily, however patient states he has spoken with PCP previously about taking 6 times daily. Patient reports he is currently taking, and \"needs\", 6 tablets per day.    Patient is requesting clarification with pharmacy to take 6 times daily.    Date of last appointment with provider: 02/26/2025    Okay to leave a detailed message?: No at Home number on file [phone] (home)   "

## 2025-04-24 ENCOUNTER — PRE VISIT (OUTPATIENT)
Dept: UROLOGY | Facility: CLINIC | Age: 75
End: 2025-04-24
Payer: MEDICARE

## 2025-05-05 ENCOUNTER — TRANSFERRED RECORDS (OUTPATIENT)
Dept: HEALTH INFORMATION MANAGEMENT | Facility: CLINIC | Age: 75
End: 2025-05-05
Payer: MEDICARE

## 2025-05-08 ENCOUNTER — OFFICE VISIT (OUTPATIENT)
Dept: UROLOGY | Facility: CLINIC | Age: 75
End: 2025-05-08
Attending: PHYSICIAN ASSISTANT
Payer: MEDICARE

## 2025-05-08 VITALS
WEIGHT: 160 LBS | HEIGHT: 68 IN | BODY MASS INDEX: 24.25 KG/M2 | SYSTOLIC BLOOD PRESSURE: 119 MMHG | DIASTOLIC BLOOD PRESSURE: 70 MMHG | HEART RATE: 84 BPM | OXYGEN SATURATION: 100 %

## 2025-05-08 DIAGNOSIS — N52.9 ERECTILE DYSFUNCTION, UNSPECIFIED ERECTILE DYSFUNCTION TYPE: ICD-10-CM

## 2025-05-08 DIAGNOSIS — N40.0 BENIGN PROSTATIC HYPERPLASIA, UNSPECIFIED WHETHER LOWER URINARY TRACT SYMPTOMS PRESENT: ICD-10-CM

## 2025-05-08 LAB
ALBUMIN UR-MCNC: NEGATIVE MG/DL
APPEARANCE UR: CLEAR
BILIRUB UR QL STRIP: NEGATIVE
COLOR UR AUTO: YELLOW
GLUCOSE UR STRIP-MCNC: NEGATIVE MG/DL
HGB UR QL STRIP: NEGATIVE
KETONES UR STRIP-MCNC: NEGATIVE MG/DL
LEUKOCYTE ESTERASE UR QL STRIP: NEGATIVE
NITRATE UR QL: NEGATIVE
PH UR STRIP: 7 [PH] (ref 5–7)
RBC URINE: <1 /HPF
SP GR UR STRIP: 1.01 (ref 1–1.03)
UROBILINOGEN UR STRIP-MCNC: NORMAL MG/DL
WBC URINE: <1 /HPF

## 2025-05-08 PROCEDURE — 99214 OFFICE O/P EST MOD 30 MIN: CPT | Mod: 25 | Performed by: UROLOGY

## 2025-05-08 PROCEDURE — 81001 URINALYSIS AUTO W/SCOPE: CPT | Performed by: PATHOLOGY

## 2025-05-08 PROCEDURE — 51798 US URINE CAPACITY MEASURE: CPT | Performed by: UROLOGY

## 2025-05-08 PROCEDURE — 3078F DIAST BP <80 MM HG: CPT | Performed by: UROLOGY

## 2025-05-08 PROCEDURE — 3074F SYST BP LT 130 MM HG: CPT | Performed by: UROLOGY

## 2025-05-08 PROCEDURE — 1126F AMNT PAIN NOTED NONE PRSNT: CPT | Performed by: UROLOGY

## 2025-05-08 RX ORDER — ACETAMINOPHEN 325 MG/1
975 TABLET ORAL ONCE
OUTPATIENT
Start: 2025-05-08 | End: 2025-05-08

## 2025-05-08 RX ORDER — ACETAMINOPHEN 650 MG/1
650 SUPPOSITORY RECTAL ONCE
OUTPATIENT
Start: 2025-05-08

## 2025-05-08 RX ORDER — FLUCONAZOLE 2 MG/ML
400 INJECTION, SOLUTION INTRAVENOUS
OUTPATIENT
Start: 2025-05-08

## 2025-05-08 ASSESSMENT — PAIN SCALES - GENERAL: PAINLEVEL_OUTOF10: NO PAIN (0)

## 2025-05-08 NOTE — PROGRESS NOTES
This is a follow-up for Mr. Paulino.  A very pleasant 74-year-old male interested in IPP placement for ED.    PDE5i's are not working well.  PANCHITO not satisfactory  He did try ICI, this was not satisfactory.      He does have a family history of prostate cancer in his maternal grandfather and 2 maternal uncles.      ED/Vascular disease risk factors:  HTN:   No  Hyperlipidemia: no  Smoking: chews-    DM: yes, DM 2 on Metformin.  Cardiovascular disease: yes, he has history of coronary artery disease.  Meds associated with ED that he's taking: SSRI,   Anxiety/anger/depression: treated, yes.  Penile Plaques or curvature:  none.     Normal testosterone July 2023  Total T528 ng/dl, free 6.7 to  Current Outpatient Medications   Medication Sig Dispense Refill    alprostadil (EDEX) 20 MCG kit 20 mcg by Intracavitary route as needed for erectile dysfunction use no more than 3 times per week 2 kit 1    alprostadil (EDEX) 20 MCG kit 20 mcg by Intracavitary route as needed for erectile dysfunction (inject 5min before intercourse.  May use once daily and up to 3x/week.) use no more than 3 times per week 2 kit 11    ANUCORT-HC 25 MG suppository       aspirin (ASA) 81 MG EC tablet 81 mg daily      biotin 300 MCG TABS tablet Take 300 mcg by mouth daily      buPROPion (WELLBUTRIN XL) 150 MG 24 hr tablet Take 1 tablet (150 mg) by mouth every morning 90 tablet 3    diphenoxylate-atropine (LOMOTIL) 2.5-0.025 MG tablet Use up to 6 times daily as needed diarrhea 180 tablet 5    dutasteride (AVODART) 0.5 MG capsule TAKE 1 CAPSULE(0.5 MG) BY MOUTH DAILY 90 capsule 2    glimepiride (AMARYL) 2 MG tablet TAKE 2 TABLETS(4 MG) BY MOUTH EVERY MORNING BEFORE BREAKFAST 180 tablet 1    hydrocortisone (ANUSOL-HC) 25 MG suppository Place 1 suppository (25 mg) rectally 2 times daily. 24 suppository 2    hydrocortisone 2.5 % ointment Apply topically 2 times daily. 30 g 1    ibuprofen (ADVIL/MOTRIN) 600 MG tablet Take 600 mg by mouth as needed       "lidocaine (XYLOCAINE) 5 % external ointment Apply topically 3 times daily as needed for moderate pain (anal pruritis) 15 g 3    metFORMIN (GLUCOPHAGE XR) 500 MG 24 hr tablet TAKE 4 TABLETS(2000 MG) BY MOUTH DAILY 360 tablet 1    modafinil (PROVIGIL) 200 MG tablet Take 1 tablet (200 mg) by mouth every morning 30 tablet 5    neomycin-polymyxin-hydrocortisone (CORTISPORIN) 3.5-92858-2 otic solution Place 3 drops in ear(s) 4 times daily. 10 mL 1    oxyCODONE-acetaminophen (PERCOCET) 5-325 MG tablet Take 1 tablet by mouth every 6 hours as needed for pain or severe pain 30 tablet 0    rosuvastatin (CRESTOR) 5 MG tablet Take 1 tablet (5 mg) by mouth daily 90 tablet 3    tadalafil (CIALIS) 20 MG tablet TAKE 1 TABLET(20 MG) BY MOUTH DAILY BEFORE SEXUAL ACTIVITY AS NEEDED 30 tablet 2    tiZANidine (ZANAFLEX) 4 MG tablet   See Instructions, Instructions: 0.5-2 tab(s) Oral q8 hrs as needed, # 30 tab(s), 1 Refill(s), Type: Maintenance, Pharmacy: [id number], 0.5-2 tab(s) Oral q8 hrs as needed, 159, lb, 05/08/23 14:41:00 CDT, Weight Measured      triamcinolone (KENALOG) 0.1 % external cream Apply topically 2 times daily. 45 g 1    triamcinolone (KENALOG) 0.1 % external cream Apply topically 2 times daily. 30 g 0     No current facility-administered medications for this visit.        Lab Results   Component Value Date    A1C 7.1 12/10/2024       Lab Results   Component Value Date    PSA 0.84 09/05/2024    PSA 0.66 04/05/2023    PSA 0.66 10/30/2021    PSA 0.4 06/09/2021    PSA 0.7 06/11/2020    PSA 0.7 02/18/2019    PSA 1.0 12/21/2017    PSA 1.5 05/10/2017    PSA 1.5 05/10/2017    PSA 1.25 04/12/2016    PSA 0.7 02/22/2016    PSA 1.42 10/03/2013            Physical Exam  /70 (BP Location: Right arm, Patient Position: Sitting, Cuff Size: Adult Large)   Pulse 84   Ht 1.727 m (5' 8\")   Wt 72.6 kg (160 lb)   SpO2 100%   BMI 24.33 kg/m        General: Alert, oriented, nad.  Pleasant and conversant.  Eyes: " anicteric, EOMI.  Pulse: regular  Resps: normal, non-labored.  Abdomen:  Nondistended.  Neurological - no tremors  Skin - no discoloration/ lesions noted   exam   Phallus circumcised  Testes ++, anodular, nontender.  Left is about 22cc, anodular, nontender.  Right testis is very soft and atrophic, about 5cc.  ( History of mumps as a child.)  Vas and epididymis present and normal bilaterally  ANDRE deferred.              A-  Organic ED.  Intracavernosal injections were not satisfactory for him.  He failed all first-line therapies.  We discussed IPP and he is interested.    Plan  - Surgery orders placed for Coloplast IPP insertion    I counseled the patient on the risks of penile implant surgery. These include, but are not limited to infection, scarring, penile shortening, damage to urethra and bladder, pain and erosion. I explained to him the risk of infection and the need for explantation in those cases; that other treatments for ED cannot be used after placing an implant, and that implants have a mechanical failure rate.  Discussed possible ectopic reservoir, possible glans necrosis/ tissue loss.  I answered all of his questions to the best of my ability and to the patient's satisfaction.  Discussed slightly increased risk of infection secondary to diabetes, but his A1c is fairly well-controlled.    Pre-Implant Check-list:  Curvature or shortening: no  Anti-coagulants: no  Reservoir location: retzius / choice  Pharmacy & Plasty: no history of narcotic use, not indicated  Anti-fungals: diabetes yes.    PVR 33 mL today  UA today is crystal-clear      Vanc/gent/difluc ordered IV  Vanc/gent liter ordered  Rifampin/gent dip ordered.         --------------------------------------------------------------------------------------------------------------------   Additional Coding Information:    Problems:  4 -- one or more chronic illnesses with exacerbation or side effects    Data Reviewed  3 or more studies reviewed, as  listed above multiple PSA and A1c values reviewed.    Tests ordered/pending: UA and PVR today    Level of risk:  4 -- minor surgery with patient or procedure risks    Time spent:  23 minutes spent on the date of the encounter doing chart review, history and exam, documentation and further activities per the note                  medical evaluation

## 2025-05-08 NOTE — LETTER
5/8/2025       RE: Nadeem Paulino  [address] [address]     Dear Colleague,    Thank you for referring your patient, Nadeem Paulino, to the [facility] [place] [facility]. Please see a copy of my visit note below.    This is a follow-up for Mr. Paulino.  A very pleasant 74-year-old male interested in IPP placement for ED.    PDE5i's are not working well.  PANCHITO not satisfactory  He did try ICI, this was not satisfactory.      He does have a family history of prostate cancer in his maternal grandfather and 2 maternal uncles.      ED/Vascular disease risk factors:  HTN:   No  Hyperlipidemia: no  Smoking: chews-    DM: yes, DM 2 on Metformin.  Cardiovascular disease: yes, he has history of coronary artery disease.  Meds associated with ED that he's taking: SSRI,   Anxiety/anger/depression: treated, yes.  Penile Plaques or curvature:  none.     Normal testosterone July 2023  Total T528 ng/dl, free 6.7 to  Current Outpatient Medications   Medication Sig Dispense Refill     alprostadil (EDEX) 20 MCG kit 20 mcg by Intracavitary route as needed for erectile dysfunction use no more than 3 times per week 2 kit 1     alprostadil (EDEX) 20 MCG kit 20 mcg by Intracavitary route as needed for erectile dysfunction (inject 5min before intercourse.  May use once daily and up to 3x/week.) use no more than 3 times per week 2 kit 11     ANUCORT-HC 25 MG suppository        aspirin (ASA) 81 MG EC tablet 81 mg daily       biotin 300 MCG TABS tablet Take 300 mcg by mouth daily       buPROPion (WELLBUTRIN XL) 150 MG 24 hr tablet Take 1 tablet (150 mg) by mouth every morning 90 tablet 3     diphenoxylate-atropine (LOMOTIL) 2.5-0.025 MG tablet Use up to 6 times daily as needed diarrhea 180 tablet 5     dutasteride (AVODART) 0.5 MG capsule TAKE 1 CAPSULE(0.5 MG) BY MOUTH DAILY 90 capsule 2     glimepiride (AMARYL) 2 MG tablet TAKE 2 TABLETS(4 MG) BY  MOUTH EVERY MORNING BEFORE BREAKFAST 180 tablet 1     hydrocortisone (ANUSOL-HC) 25 MG suppository Place 1 suppository (25 mg) rectally 2 times daily. 24 suppository 2     hydrocortisone 2.5 % ointment Apply topically 2 times daily. 30 g 1     ibuprofen (ADVIL/MOTRIN) 600 MG tablet Take 600 mg by mouth as needed       lidocaine (XYLOCAINE) 5 % external ointment Apply topically 3 times daily as needed for moderate pain (anal pruritis) 15 g 3     metFORMIN (GLUCOPHAGE XR) 500 MG 24 hr tablet TAKE 4 TABLETS(2000 MG) BY MOUTH DAILY 360 tablet 1     modafinil (PROVIGIL) 200 MG tablet Take 1 tablet (200 mg) by mouth every morning 30 tablet 5     neomycin-polymyxin-hydrocortisone (CORTISPORIN) 3.5-71105-8 otic solution Place 3 drops in ear(s) 4 times daily. 10 mL 1     oxyCODONE-acetaminophen (PERCOCET) 5-325 MG tablet Take 1 tablet by mouth every 6 hours as needed for pain or severe pain 30 tablet 0     rosuvastatin (CRESTOR) 5 MG tablet Take 1 tablet (5 mg) by mouth daily 90 tablet 3     tadalafil (CIALIS) 20 MG tablet TAKE 1 TABLET(20 MG) BY MOUTH DAILY BEFORE SEXUAL ACTIVITY AS NEEDED 30 tablet 2     tiZANidine (ZANAFLEX) 4 MG tablet   See Instructions, Instructions: 0.5-2 tab(s) Oral q8 hrs as needed, # 30 tab(s), 1 Refill(s), Type: Maintenance, Pharmacy: [facility] #[id number], 0.5-2 tab(s) Oral q8 hrs as needed, 159, lb, 05/08/23 14:41:00 CDT, Weight Measured       triamcinolone (KENALOG) 0.1 % external cream Apply topically 2 times daily. 45 g 1     triamcinolone (KENALOG) 0.1 % external cream Apply topically 2 times daily. 30 g 0     No current facility-administered medications for this visit.        Lab Results   Component Value Date    A1C 7.1 12/10/2024       Lab Results   Component Value Date    PSA 0.84 09/05/2024    PSA 0.66 04/05/2023    PSA 0.66 10/30/2021    PSA 0.4 06/09/2021    PSA 0.7 06/11/2020    PSA 0.7 02/18/2019    PSA 1.0 12/21/2017    PSA 1.5 05/10/2017    PSA 1.5 05/10/2017    PSA 1.25  "04/12/2016    PSA 0.7 02/22/2016    PSA 1.42 10/03/2013            Physical Exam  /70 (BP Location: Right arm, Patient Position: Sitting, Cuff Size: Adult Large)   Pulse 84   Ht 1.727 m (5' 8\")   Wt 72.6 kg (160 lb)   SpO2 100%   BMI 24.33 kg/m        General: Alert, oriented, nad.  Pleasant and conversant.  Eyes: anicteric, EOMI.  Pulse: regular  Resps: normal, non-labored.  Abdomen:  Nondistended.  Neurological - no tremors  Skin - no discoloration/ lesions noted   exam   Phallus circumcised  Testes ++, anodular, nontender.  Left is about 22cc, anodular, nontender.  Right testis is very soft and atrophic, about 5cc.  ( History of mumps as a child.)  Vas and epididymis present and normal bilaterally  ANDRE deferred.              A-  Organic ED.  Intracavernosal injections were not satisfactory for him.  He failed all first-line therapies.  We discussed IPP and he is interested.    Plan  - Surgery orders placed for Coloplast IPP insertion    I counseled the patient on the risks of penile implant surgery. These include, but are not limited to infection, scarring, penile shortening, damage to urethra and bladder, pain and erosion. I explained to him the risk of infection and the need for explantation in those cases; that other treatments for ED cannot be used after placing an implant, and that implants have a mechanical failure rate.  Discussed possible ectopic reservoir, possible glans necrosis/ tissue loss.  I answered all of his questions to the best of my ability and to the patient's satisfaction.  Discussed slightly increased risk of infection secondary to diabetes, but his A1c is fairly well-controlled.    Pre-Implant Check-list:  Curvature or shortening: no  Anti-coagulants: no  Reservoir location: retzius / choice  Pharmacy & Plasty: no history of narcotic use, not indicated  Anti-fungals: diabetes yes.    PVR 33 mL today  UA today is crystal-clear      Vanc/gent/difluc ordered IV  Vanc/gent liter " ordered  Rifampin/gent dip ordered.         --------------------------------------------------------------------------------------------------------------------   Additional Coding Information:    Problems:  4 -- one or more chronic illnesses with exacerbation or side effects    Data Reviewed  3 or more studies reviewed, as listed above multiple PSA and A1c values reviewed.    Tests ordered/pending: UA and PVR today    Level of risk:  4 -- minor surgery with patient or procedure risks    Time spent:  23 minutes spent on the date of the encounter doing chart review, history and exam, documentation and further activities per the note                   Again, thank you for allowing me to participate in the care of your patient.      Sincerely,    Fabio Rodgers MD

## 2025-05-08 NOTE — NURSING NOTE
"Chief Complaint   Patient presents with    Consult     Pt states here for follow up on ED     Pt states that he's here for IPP consult    Blood pressure 119/70, pulse 84, height 1.727 m (5' 8\"), weight 72.6 kg (160 lb), SpO2 100%. Body mass index is 24.33 kg/m .    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)    Dyshidrosis    Primary dysthymia    Golfer's elbow    History of anemia    History of colectomy    History of colonic polyps    Astigmatism    Impotence of organic origin    Injury of olfactory nerve    Internal hemorrhoids    Internal hemorrhoids without complication    Iron deficiency    Mental disorder    Neck pain    Neuropathy    Obstructive sleep apnea (adult) (pediatric)    Arthritis    Osteopenia    Palpitations    Polyp of large intestine    Presbyopia    Proctalgia fugax    Hyperlipidemia    Seasonal allergies    Arthropathy of cervical facet joint    Lupus erythematosus    Type 2 diabetes mellitus without complications (H)       No Known Allergies    Current Outpatient Medications   Medication Sig Dispense Refill    alprostadil (EDEX) 20 MCG kit 20 mcg by Intracavitary route as needed for erectile dysfunction use no more than 3 times per week 2 kit 1    alprostadil (EDEX) 20 MCG kit 20 mcg by Intracavitary route as needed for erectile dysfunction (inject 5min before intercourse.  May use once daily and up to 3x/week.) use no more than 3 times per week 2 kit 11    " ANUCORT-HC 25 MG suppository       aspirin (ASA) 81 MG EC tablet 81 mg daily      biotin 300 MCG TABS tablet Take 300 mcg by mouth daily      buPROPion (WELLBUTRIN XL) 150 MG 24 hr tablet Take 1 tablet (150 mg) by mouth every morning 90 tablet 3    diphenoxylate-atropine (LOMOTIL) 2.5-0.025 MG tablet Use up to 6 times daily as needed diarrhea 180 tablet 5    dutasteride (AVODART) 0.5 MG capsule TAKE 1 CAPSULE(0.5 MG) BY MOUTH DAILY 90 capsule 2    glimepiride (AMARYL) 2 MG tablet TAKE 2 TABLETS(4 MG) BY MOUTH EVERY MORNING BEFORE BREAKFAST 180 tablet 1    hydrocortisone (ANUSOL-HC) 25 MG suppository Place 1 suppository (25 mg) rectally 2 times daily. 24 suppository 2    hydrocortisone 2.5 % ointment Apply topically 2 times daily. 30 g 1    ibuprofen (ADVIL/MOTRIN) 600 MG tablet Take 600 mg by mouth as needed      lidocaine (XYLOCAINE) 5 % external ointment Apply topically 3 times daily as needed for moderate pain (anal pruritis) 15 g 3    metFORMIN (GLUCOPHAGE XR) 500 MG 24 hr tablet TAKE 4 TABLETS(2000 MG) BY MOUTH DAILY 360 tablet 1    modafinil (PROVIGIL) 200 MG tablet Take 1 tablet (200 mg) by mouth every morning 30 tablet 5    neomycin-polymyxin-hydrocortisone (CORTISPORIN) 3.5-04756-5 otic solution Place 3 drops in ear(s) 4 times daily. 10 mL 1    oxyCODONE-acetaminophen (PERCOCET) 5-325 MG tablet Take 1 tablet by mouth every 6 hours as needed for pain or severe pain 30 tablet 0    rosuvastatin (CRESTOR) 5 MG tablet Take 1 tablet (5 mg) by mouth daily 90 tablet 3    tadalafil (CIALIS) 20 MG tablet TAKE 1 TABLET(20 MG) BY MOUTH DAILY BEFORE SEXUAL ACTIVITY AS NEEDED 30 tablet 2    tiZANidine (ZANAFLEX) 4 MG tablet   See Instructions, Instructions: 0.5-2 tab(s) Oral q8 hrs as needed, # 30 tab(s), 1 Refill(s), Type: Maintenance, Pharmacy: [facility] #[id number], 0.5-2 tab(s) Oral q8 hrs as needed, 159, lb, 05/08/23 14:41:00 CDT, Weight Measured      triamcinolone (KENALOG) 0.1 % external cream Apply  topically 2 times daily. 45 g 1    triamcinolone (KENALOG) 0.1 % external cream Apply topically 2 times daily. 30 g 0       Social History     Tobacco Use    Smoking status: Never     Passive exposure: Never    Smokeless tobacco: Current     Types: Chew   Vaping Use    Vaping status: Never Used       Fredrick Mireles  5/8/2025  10:59 AM

## 2025-05-10 DIAGNOSIS — N52.9 ERECTILE DYSFUNCTION, UNSPECIFIED ERECTILE DYSFUNCTION TYPE: ICD-10-CM

## 2025-05-12 NOTE — TELEPHONE ENCOUNTER
Last Written Prescription:  alprostadil (EDEX) 20 MCG kit  20 mcg, PRN           Summary: 20 mcg by Intracavitary route as needed for erectile dysfunction (inject 5min before intercourse. May use once daily and up to 3x/week.) use no more than 3 times per week, Disp-2 kit, R-11, E-Prescribe Dr Rodgers is on urology staff at the [facility] [place].   Dose, Route, Frequency: 20 mcg, Intracavitary, PRNStart: 2024Ordered On: harmacy: [facility] - [place], [place] - [address] Associated: Taking: Long-term: Med Note:                Directions: 20 mcg by Intracavitary route as needed for erectile dysfunction (inject 5min before intercourse. May use once daily and up to 3x/week.) use no more than 3 times per week  Ordering Department: [facility]  Authorized By: Fabio Rodgers MD  Dispense: 2 kit  Refills: 11 ordered       ----------------------  Last Visit Date: 25 DEB  Future Visit Date: 25  ----------------------      Refill decision: Medication refilled per  Medication Refill in Ambulatory Care  policy.   []  If no future appointment scheduled: Route to Clinic Coordinators to contact the pt for appointment.      Refill decision: Medication unable to be refilled by RN due to: Medication not included in refill protocol policy         Request from pharmacy:  Requested Prescriptions   Pending Prescriptions Disp Refills    alprostadil (EDEX) 20 MCG kit 2 kit 11     Si mcg by Intracavitary route as needed for erectile dysfunction (inject 5min before intercourse.  May use once daily and up to 3x/week.). use no more than 3 times per week       There is no refill protocol information for this order        Amina WESTFALL RN  [facility]/Red Flag Triage & Med Refill Team

## 2025-05-31 DIAGNOSIS — E11.9 TYPE 2 DIABETES MELLITUS WITHOUT COMPLICATION, WITHOUT LONG-TERM CURRENT USE OF INSULIN (H): ICD-10-CM

## 2025-06-02 RX ORDER — GLIMEPIRIDE 2 MG/1
TABLET ORAL
Qty: 180 TABLET | Refills: 0 | Status: SHIPPED | OUTPATIENT
Start: 2025-06-02

## 2025-06-05 ENCOUNTER — TELEPHONE (OUTPATIENT)
Dept: FAMILY MEDICINE | Facility: CLINIC | Age: 75
End: 2025-06-05
Payer: MEDICARE

## 2025-06-05 NOTE — TELEPHONE ENCOUNTER
Left message for patient to call back and reschedule to virtual or a different provider for the 06-06-25 appt.    Geetha Bruner on 6/5/2025 at 8:36 AM

## 2025-06-09 ENCOUNTER — OFFICE VISIT (OUTPATIENT)
Dept: FAMILY MEDICINE | Facility: CLINIC | Age: 75
End: 2025-06-09
Payer: MEDICARE

## 2025-06-09 VITALS
WEIGHT: 164.6 LBS | HEIGHT: 68 IN | SYSTOLIC BLOOD PRESSURE: 122 MMHG | BODY MASS INDEX: 24.95 KG/M2 | DIASTOLIC BLOOD PRESSURE: 68 MMHG | HEART RATE: 80 BPM | OXYGEN SATURATION: 98 % | TEMPERATURE: 98 F | RESPIRATION RATE: 18 BRPM

## 2025-06-09 DIAGNOSIS — E11.9 TYPE 2 DIABETES MELLITUS WITHOUT COMPLICATION, WITHOUT LONG-TERM CURRENT USE OF INSULIN (H): Primary | ICD-10-CM

## 2025-06-09 DIAGNOSIS — Z12.5 SCREENING FOR PROSTATE CANCER: ICD-10-CM

## 2025-06-09 DIAGNOSIS — G47.33 OSA (OBSTRUCTIVE SLEEP APNEA): ICD-10-CM

## 2025-06-09 DIAGNOSIS — M54.9 DORSALGIA, UNSPECIFIED: ICD-10-CM

## 2025-06-09 DIAGNOSIS — G89.29 OTHER CHRONIC PAIN: ICD-10-CM

## 2025-06-09 DIAGNOSIS — F41.1 GENERALIZED ANXIETY DISORDER: ICD-10-CM

## 2025-06-09 PROCEDURE — 3078F DIAST BP <80 MM HG: CPT | Performed by: PHYSICIAN ASSISTANT

## 2025-06-09 PROCEDURE — 99213 OFFICE O/P EST LOW 20 MIN: CPT | Performed by: PHYSICIAN ASSISTANT

## 2025-06-09 PROCEDURE — 3074F SYST BP LT 130 MM HG: CPT | Performed by: PHYSICIAN ASSISTANT

## 2025-06-09 RX ORDER — MODAFINIL 200 MG/1
200 TABLET ORAL EVERY MORNING
Qty: 30 TABLET | Refills: 5 | Status: SHIPPED | OUTPATIENT
Start: 2025-06-09

## 2025-06-09 RX ORDER — OXYCODONE AND ACETAMINOPHEN 5; 325 MG/1; MG/1
1 TABLET ORAL EVERY 6 HOURS PRN
Qty: 30 TABLET | Refills: 0 | Status: CANCELLED | OUTPATIENT
Start: 2025-06-09

## 2025-06-09 NOTE — PROGRESS NOTES
"  Assessment & Plan     (E11.9) Type 2 diabetes mellitus without complication, without long-term current use of insulin (H)  (primary encounter diagnosis)  Comment: Due for labs 2 months  Plan: A1c in July    (G47.33) TIMOTHY (obstructive sleep apnea)  Comment: Stable  Plan:     (G89.29) Other chronic pain  Comment: Continue current medication  Plan: He was provided pills of his pain acutely worse    (N40.0) Benign prostatic hyperplasia, unspecified whether lower urinary tract symptoms present  Comment: Stable  Plan: Continue current    (M54.9) Dorsalgia, unspecified  Comment: Stable and improved  Plan: Current plan follow-up if pain acutely worsens(F41.1) Generalized anxiety disorder  Comment: Medication renewed  Plan: Follow-up in 6 months prior as needed          BMI  Estimated body mass index is 25.03 kg/m  as calculated from the following:    Height as of this encounter: 1.727 m (5' 8\").    Weight as of this encounter: 74.7 kg (164 lb 9.6 oz).           Viji Silver is a 74 year old, presenting for the following health issues:  Musculoskeletal Problem (Back and right neck pain radiates right arm after falling out of lawnchair )        6/9/2025     4:33 PM   Additional Questions   Roomed by IAN Rios     Patient with history of chronic neck and back pain presents to the clinic after falling while at a black powder shooting event he sat in his recliner and it was not level when he went over backwards  He missed the tailgate fortunately  He had no loss of consciousness  He was able to finish his shoot  He feels better now but thought he should have this checked  He does need a couple medications refilled as well    Musculoskeletal Problem      Alert attentive no acute distress                  Objective    /68 (BP Location: Right arm, Patient Position: Sitting, Cuff Size: Adult Regular)   Pulse 80   Temp 98  F (36.7  C) (Tympanic)   Resp 18   Ht 1.727 m (5' 8\")   Wt 74.7 kg (164 lb 9.6 oz)   SpO2 " 98%   BMI 25.03 kg/m    Body mass index is 25.03 kg/m .  Physical Exam   Mild tenderness in the right lateral paravertebral muscles no palpable disturbance of the spine  Slight decreased range of motion of the he lumbar spine with both flexion extension lateral rotation and bending  No rib tenderness  Lungs: Clear  Cardiovascular regular rate            Signed Electronically by: RILEY Reyes

## 2025-06-27 PROCEDURE — G0103 PSA SCREENING: HCPCS | Performed by: PHYSICIAN ASSISTANT

## 2025-07-24 DIAGNOSIS — E11.9 TYPE 2 DIABETES MELLITUS WITHOUT COMPLICATION, WITHOUT LONG-TERM CURRENT USE OF INSULIN (H): ICD-10-CM

## 2025-07-24 RX ORDER — METFORMIN HYDROCHLORIDE 500 MG/1
TABLET, EXTENDED RELEASE ORAL
Qty: 360 TABLET | Refills: 1 | Status: SHIPPED | OUTPATIENT
Start: 2025-07-24

## 2025-08-05 DIAGNOSIS — E11.9 TYPE 2 DIABETES MELLITUS WITHOUT COMPLICATION, WITHOUT LONG-TERM CURRENT USE OF INSULIN (H): ICD-10-CM

## 2025-08-05 RX ORDER — GLIMEPIRIDE 2 MG/1
TABLET ORAL
Qty: 180 TABLET | Refills: 0 | Status: SHIPPED | OUTPATIENT
Start: 2025-08-05

## 2025-08-06 ENCOUNTER — OFFICE VISIT (OUTPATIENT)
Dept: FAMILY MEDICINE | Facility: CLINIC | Age: 75
End: 2025-08-06
Payer: MEDICARE

## 2025-08-06 VITALS
DIASTOLIC BLOOD PRESSURE: 68 MMHG | TEMPERATURE: 97.6 F | RESPIRATION RATE: 16 BRPM | BODY MASS INDEX: 24.35 KG/M2 | HEART RATE: 77 BPM | WEIGHT: 160.7 LBS | OXYGEN SATURATION: 97 % | SYSTOLIC BLOOD PRESSURE: 122 MMHG | HEIGHT: 68 IN

## 2025-08-06 VITALS
HEIGHT: 68 IN | RESPIRATION RATE: 16 BRPM | HEART RATE: 77 BPM | SYSTOLIC BLOOD PRESSURE: 122 MMHG | DIASTOLIC BLOOD PRESSURE: 68 MMHG | TEMPERATURE: 97.6 F | WEIGHT: 160 LBS | BODY MASS INDEX: 24.25 KG/M2

## 2025-08-06 DIAGNOSIS — G47.33 OSA (OBSTRUCTIVE SLEEP APNEA): ICD-10-CM

## 2025-08-06 DIAGNOSIS — E78.2 MIXED HYPERLIPIDEMIA: ICD-10-CM

## 2025-08-06 DIAGNOSIS — E11.69 DISORDER OF NERVOUS SYSTEM DUE TO TYPE 2 DIABETES MELLITUS (H): Primary | ICD-10-CM

## 2025-08-06 DIAGNOSIS — E11.9 TYPE 2 DIABETES MELLITUS WITHOUT COMPLICATION, WITHOUT LONG-TERM CURRENT USE OF INSULIN (H): ICD-10-CM

## 2025-08-06 DIAGNOSIS — E11.9 TYPE 2 DIABETES MELLITUS WITHOUT COMPLICATION, WITHOUT LONG-TERM CURRENT USE OF INSULIN (H): Primary | ICD-10-CM

## 2025-08-06 DIAGNOSIS — F41.1 GENERALIZED ANXIETY DISORDER: ICD-10-CM

## 2025-08-06 DIAGNOSIS — Z01.818 PRE-OP EXAM: ICD-10-CM

## 2025-08-06 DIAGNOSIS — I10 ESSENTIAL HYPERTENSION: ICD-10-CM

## 2025-08-06 DIAGNOSIS — Z00.00 MEDICARE ANNUAL WELLNESS VISIT, SUBSEQUENT: ICD-10-CM

## 2025-08-06 DIAGNOSIS — N52.9 ERECTILE DYSFUNCTION, UNSPECIFIED ERECTILE DYSFUNCTION TYPE: ICD-10-CM

## 2025-08-06 DIAGNOSIS — G98.8 DISORDER OF NERVOUS SYSTEM DUE TO TYPE 2 DIABETES MELLITUS (H): Primary | ICD-10-CM

## 2025-08-06 DIAGNOSIS — K52.9 CHRONIC DIARRHEA: ICD-10-CM

## 2025-08-06 LAB
ERYTHROCYTE [DISTWIDTH] IN BLOOD BY AUTOMATED COUNT: 13.1 % (ref 10–15)
EST. AVERAGE GLUCOSE BLD GHB EST-MCNC: 169 MG/DL
HBA1C MFR BLD: 7.5 % (ref 0–5.6)
HCT VFR BLD AUTO: 36.9 % (ref 40–53)
HGB BLD-MCNC: 12.6 G/DL (ref 13.3–17.7)
MCH RBC QN AUTO: 31.3 PG (ref 26.5–33)
MCHC RBC AUTO-ENTMCNC: 34.1 G/DL (ref 31.5–36.5)
MCV RBC AUTO: 92 FL (ref 78–100)
PLATELET # BLD AUTO: 225 10E3/UL (ref 150–450)
RBC # BLD AUTO: 4.03 10E6/UL (ref 4.4–5.9)
WBC # BLD AUTO: 4.4 10E3/UL (ref 4–11)

## 2025-08-06 PROCEDURE — 99214 OFFICE O/P EST MOD 30 MIN: CPT | Performed by: PHYSICIAN ASSISTANT

## 2025-08-06 PROCEDURE — 85027 COMPLETE CBC AUTOMATED: CPT | Performed by: PHYSICIAN ASSISTANT

## 2025-08-06 PROCEDURE — 82465 ASSAY BLD/SERUM CHOLESTEROL: CPT | Performed by: PHYSICIAN ASSISTANT

## 2025-08-06 PROCEDURE — 83036 HEMOGLOBIN GLYCOSYLATED A1C: CPT | Performed by: PHYSICIAN ASSISTANT

## 2025-08-06 PROCEDURE — 36415 COLL VENOUS BLD VENIPUNCTURE: CPT | Performed by: PHYSICIAN ASSISTANT

## 2025-08-06 PROCEDURE — 82310 ASSAY OF CALCIUM: CPT | Performed by: PHYSICIAN ASSISTANT

## 2025-08-06 SDOH — HEALTH STABILITY: PHYSICAL HEALTH: ON AVERAGE, HOW MANY DAYS PER WEEK DO YOU ENGAGE IN MODERATE TO STRENUOUS EXERCISE (LIKE A BRISK WALK)?: 3 DAYS

## 2025-08-06 SDOH — HEALTH STABILITY: PHYSICAL HEALTH: ON AVERAGE, HOW MANY MINUTES DO YOU ENGAGE IN EXERCISE AT THIS LEVEL?: 120 MIN

## 2025-08-06 ASSESSMENT — PATIENT HEALTH QUESTIONNAIRE - PHQ9
SUM OF ALL RESPONSES TO PHQ QUESTIONS 1-9: 9
SUM OF ALL RESPONSES TO PHQ QUESTIONS 1-9: 9
10. IF YOU CHECKED OFF ANY PROBLEMS, HOW DIFFICULT HAVE THESE PROBLEMS MADE IT FOR YOU TO DO YOUR WORK, TAKE CARE OF THINGS AT HOME, OR GET ALONG WITH OTHER PEOPLE: SOMEWHAT DIFFICULT

## 2025-08-06 ASSESSMENT — SOCIAL DETERMINANTS OF HEALTH (SDOH): HOW OFTEN DO YOU GET TOGETHER WITH FRIENDS OR RELATIVES?: TWICE A WEEK

## 2025-08-07 LAB
ANION GAP SERPL CALCULATED.3IONS-SCNC: 12 MMOL/L (ref 7–15)
BUN SERPL-MCNC: 8.9 MG/DL (ref 8–23)
CALCIUM SERPL-MCNC: 9.9 MG/DL (ref 8.8–10.4)
CHLORIDE SERPL-SCNC: 100 MMOL/L (ref 98–107)
CHOLEST SERPL-MCNC: 104 MG/DL
CREAT SERPL-MCNC: 0.87 MG/DL (ref 0.67–1.17)
EGFRCR SERPLBLD CKD-EPI 2021: >90 ML/MIN/1.73M2
FASTING STATUS PATIENT QL REPORTED: ABNORMAL
FASTING STATUS PATIENT QL REPORTED: NORMAL
GLUCOSE SERPL-MCNC: 139 MG/DL (ref 70–99)
HCO3 SERPL-SCNC: 27 MMOL/L (ref 22–29)
HDLC SERPL-MCNC: 57 MG/DL
LDLC SERPL CALC-MCNC: 34 MG/DL
NONHDLC SERPL-MCNC: 47 MG/DL
POTASSIUM SERPL-SCNC: 4.6 MMOL/L (ref 3.4–5.3)
SODIUM SERPL-SCNC: 139 MMOL/L (ref 135–145)
TRIGL SERPL-MCNC: 64 MG/DL

## 2025-08-11 DIAGNOSIS — R39.89 SUSPECTED UTI: Primary | ICD-10-CM

## 2025-08-18 ENCOUNTER — PRE VISIT (OUTPATIENT)
Dept: UROLOGY | Facility: CLINIC | Age: 75
End: 2025-08-18
Payer: MEDICARE

## 2025-09-01 ENCOUNTER — ANESTHESIA EVENT (OUTPATIENT)
Dept: SURGERY | Facility: CLINIC | Age: 75
End: 2025-09-01
Payer: MEDICARE

## 2025-09-02 ENCOUNTER — ANESTHESIA (OUTPATIENT)
Dept: SURGERY | Facility: CLINIC | Age: 75
End: 2025-09-02
Payer: MEDICARE

## 2025-09-02 ENCOUNTER — HOSPITAL ENCOUNTER (OUTPATIENT)
Facility: CLINIC | Age: 75
Discharge: HOME | End: 2025-09-03
Attending: UROLOGY | Admitting: UROLOGY
Payer: MEDICARE

## 2025-09-02 DIAGNOSIS — N52.9 ERECTILE DYSFUNCTION, UNSPECIFIED ERECTILE DYSFUNCTION TYPE: Primary | ICD-10-CM

## 2025-09-02 LAB
GLUCOSE BLDC GLUCOMTR-MCNC: 108 MG/DL (ref 70–99)
GLUCOSE BLDC GLUCOMTR-MCNC: 115 MG/DL (ref 70–99)
GLUCOSE BLDC GLUCOMTR-MCNC: 186 MG/DL (ref 70–99)

## 2025-09-02 PROCEDURE — 82962 GLUCOSE BLOOD TEST: CPT

## 2025-09-02 PROCEDURE — 258N000003 HC RX IP 258 OP 636

## 2025-09-02 PROCEDURE — 250N000011 HC RX IP 250 OP 636: Performed by: UROLOGY

## 2025-09-02 PROCEDURE — 258N000003 HC RX IP 258 OP 636: Performed by: ANESTHESIOLOGY

## 2025-09-02 PROCEDURE — 999N000141 HC STATISTIC PRE-PROCEDURE NURSING ASSESSMENT: Performed by: UROLOGY

## 2025-09-02 PROCEDURE — C1813 PROSTHESIS, PENILE, INFLATAB: HCPCS | Performed by: UROLOGY

## 2025-09-02 PROCEDURE — 258N000003 HC RX IP 258 OP 636: Performed by: UROLOGY

## 2025-09-02 PROCEDURE — 250N000009 HC RX 250: Performed by: UROLOGY

## 2025-09-02 PROCEDURE — 250N000011 HC RX IP 250 OP 636: Mod: JW

## 2025-09-02 PROCEDURE — 272N000001 HC OR GENERAL SUPPLY STERILE: Performed by: UROLOGY

## 2025-09-02 PROCEDURE — 250N000013 HC RX MED GY IP 250 OP 250 PS 637: Performed by: UROLOGY

## 2025-09-02 PROCEDURE — 360N000076 HC SURGERY LEVEL 3, PER MIN: Performed by: UROLOGY

## 2025-09-02 PROCEDURE — 250N000009 HC RX 250: Performed by: ANESTHESIOLOGY

## 2025-09-02 PROCEDURE — 250N000011 HC RX IP 250 OP 636: Performed by: STUDENT IN AN ORGANIZED HEALTH CARE EDUCATION/TRAINING PROGRAM

## 2025-09-02 PROCEDURE — 250N000025 HC SEVOFLURANE, PER MIN: Performed by: UROLOGY

## 2025-09-02 PROCEDURE — 370N000017 HC ANESTHESIA TECHNICAL FEE, PER MIN: Performed by: UROLOGY

## 2025-09-02 PROCEDURE — 250N000013 HC RX MED GY IP 250 OP 250 PS 637

## 2025-09-02 PROCEDURE — 250N000011 HC RX IP 250 OP 636: Performed by: ANESTHESIOLOGY

## 2025-09-02 PROCEDURE — 710N000010 HC RECOVERY PHASE 1, LEVEL 2, PER MIN: Performed by: UROLOGY

## 2025-09-02 DEVICE — RESERVOIR TITAN CL ER8125: Type: IMPLANTABLE DEVICE | Site: ABDOMEN | Status: FUNCTIONAL

## 2025-09-02 DEVICE — IMPLANTABLE DEVICE: Type: IMPLANTABLE DEVICE | Site: PENIS | Status: FUNCTIONAL

## 2025-09-02 RX ORDER — DEXAMETHASONE SODIUM PHOSPHATE 4 MG/ML
4 INJECTION, SOLUTION INTRA-ARTICULAR; INTRALESIONAL; INTRAMUSCULAR; INTRAVENOUS; SOFT TISSUE
Status: DISCONTINUED | OUTPATIENT
Start: 2025-09-02 | End: 2025-09-02 | Stop reason: HOSPADM

## 2025-09-02 RX ORDER — FENTANYL CITRATE 50 UG/ML
25 INJECTION, SOLUTION INTRAMUSCULAR; INTRAVENOUS EVERY 5 MIN PRN
Status: DISCONTINUED | OUTPATIENT
Start: 2025-09-02 | End: 2025-09-02 | Stop reason: HOSPADM

## 2025-09-02 RX ORDER — HYDROMORPHONE HYDROCHLORIDE 1 MG/ML
0.2 INJECTION, SOLUTION INTRAMUSCULAR; INTRAVENOUS; SUBCUTANEOUS
Status: DISCONTINUED | OUTPATIENT
Start: 2025-09-02 | End: 2025-09-03 | Stop reason: HOSPADM

## 2025-09-02 RX ORDER — DEXTROSE MONOHYDRATE 25 G/50ML
25-50 INJECTION, SOLUTION INTRAVENOUS
Status: DISCONTINUED | OUTPATIENT
Start: 2025-09-02 | End: 2025-09-02

## 2025-09-02 RX ORDER — ONDANSETRON 2 MG/ML
4 INJECTION INTRAMUSCULAR; INTRAVENOUS EVERY 30 MIN PRN
Status: DISCONTINUED | OUTPATIENT
Start: 2025-09-02 | End: 2025-09-02 | Stop reason: HOSPADM

## 2025-09-02 RX ORDER — NICOTINE POLACRILEX 4 MG
15-30 LOZENGE BUCCAL
Status: DISCONTINUED | OUTPATIENT
Start: 2025-09-02 | End: 2025-09-03 | Stop reason: HOSPADM

## 2025-09-02 RX ORDER — OXYCODONE HYDROCHLORIDE 5 MG/1
5 TABLET ORAL EVERY 4 HOURS PRN
Status: DISCONTINUED | OUTPATIENT
Start: 2025-09-02 | End: 2025-09-03 | Stop reason: HOSPADM

## 2025-09-02 RX ORDER — LIDOCAINE 40 MG/G
CREAM TOPICAL
Status: DISCONTINUED | OUTPATIENT
Start: 2025-09-02 | End: 2025-09-02 | Stop reason: HOSPADM

## 2025-09-02 RX ORDER — BUPIVACAINE HYDROCHLORIDE 5 MG/ML
INJECTION, SOLUTION PERINEURAL PRN
Status: DISCONTINUED | OUTPATIENT
Start: 2025-09-02 | End: 2025-09-02 | Stop reason: HOSPADM

## 2025-09-02 RX ORDER — NALOXONE HYDROCHLORIDE 0.4 MG/ML
0.4 INJECTION, SOLUTION INTRAMUSCULAR; INTRAVENOUS; SUBCUTANEOUS
Status: DISCONTINUED | OUTPATIENT
Start: 2025-09-02 | End: 2025-09-03 | Stop reason: HOSPADM

## 2025-09-02 RX ORDER — CEFAZOLIN SODIUM 1 G/3ML
1 INJECTION, POWDER, FOR SOLUTION INTRAMUSCULAR; INTRAVENOUS EVERY 8 HOURS
Status: DISCONTINUED | OUTPATIENT
Start: 2025-09-02 | End: 2025-09-03 | Stop reason: HOSPADM

## 2025-09-02 RX ORDER — ACETAMINOPHEN 325 MG/1
975 TABLET ORAL ONCE
Status: COMPLETED | OUTPATIENT
Start: 2025-09-02 | End: 2025-09-02

## 2025-09-02 RX ORDER — BISACODYL 10 MG
10 SUPPOSITORY, RECTAL RECTAL DAILY PRN
Status: DISCONTINUED | OUTPATIENT
Start: 2025-09-05 | End: 2025-09-03 | Stop reason: HOSPADM

## 2025-09-02 RX ORDER — NALOXONE HYDROCHLORIDE 0.4 MG/ML
0.1 INJECTION, SOLUTION INTRAMUSCULAR; INTRAVENOUS; SUBCUTANEOUS
Status: DISCONTINUED | OUTPATIENT
Start: 2025-09-02 | End: 2025-09-02 | Stop reason: HOSPADM

## 2025-09-02 RX ORDER — ACETAMINOPHEN 325 MG/1
975 TABLET ORAL ONCE
Status: DISCONTINUED | OUTPATIENT
Start: 2025-09-02 | End: 2025-09-02 | Stop reason: HOSPADM

## 2025-09-02 RX ORDER — LIDOCAINE 40 MG/G
CREAM TOPICAL
Status: DISCONTINUED | OUTPATIENT
Start: 2025-09-02 | End: 2025-09-03 | Stop reason: HOSPADM

## 2025-09-02 RX ORDER — SODIUM CHLORIDE, SODIUM LACTATE, POTASSIUM CHLORIDE, CALCIUM CHLORIDE 600; 310; 30; 20 MG/100ML; MG/100ML; MG/100ML; MG/100ML
INJECTION, SOLUTION INTRAVENOUS CONTINUOUS PRN
Status: DISCONTINUED | OUTPATIENT
Start: 2025-09-02 | End: 2025-09-02

## 2025-09-02 RX ORDER — ACETAMINOPHEN 325 MG/1
975 TABLET ORAL EVERY 8 HOURS
Status: DISCONTINUED | OUTPATIENT
Start: 2025-09-02 | End: 2025-09-02 | Stop reason: HOSPADM

## 2025-09-02 RX ORDER — BACITRACIN ZINC 500 [USP'U]/G
OINTMENT TOPICAL PRN
Status: DISCONTINUED | OUTPATIENT
Start: 2025-09-02 | End: 2025-09-02 | Stop reason: HOSPADM

## 2025-09-02 RX ORDER — NALOXONE HYDROCHLORIDE 0.4 MG/ML
0.2 INJECTION, SOLUTION INTRAMUSCULAR; INTRAVENOUS; SUBCUTANEOUS
Status: DISCONTINUED | OUTPATIENT
Start: 2025-09-02 | End: 2025-09-03 | Stop reason: HOSPADM

## 2025-09-02 RX ORDER — SODIUM CHLORIDE, SODIUM LACTATE, POTASSIUM CHLORIDE, CALCIUM CHLORIDE 600; 310; 30; 20 MG/100ML; MG/100ML; MG/100ML; MG/100ML
INJECTION, SOLUTION INTRAVENOUS CONTINUOUS
Status: DISCONTINUED | OUTPATIENT
Start: 2025-09-02 | End: 2025-09-02 | Stop reason: HOSPADM

## 2025-09-02 RX ORDER — ACETAMINOPHEN 650 MG/1
650 SUPPOSITORY RECTAL ONCE
Status: COMPLETED | OUTPATIENT
Start: 2025-09-02 | End: 2025-09-02

## 2025-09-02 RX ORDER — FLUCONAZOLE 2 MG/ML
400 INJECTION, SOLUTION INTRAVENOUS
Status: COMPLETED | OUTPATIENT
Start: 2025-09-02 | End: 2025-09-02

## 2025-09-02 RX ORDER — ATROPA BELLADONNA AND OPIUM 16.2; 3 MG/1; MG/1
30 SUPPOSITORY RECTAL EVERY 12 HOURS PRN
Status: DISCONTINUED | OUTPATIENT
Start: 2025-09-02 | End: 2025-09-03 | Stop reason: HOSPADM

## 2025-09-02 RX ORDER — HALOPERIDOL 5 MG/ML
1 INJECTION INTRAMUSCULAR
Status: DISCONTINUED | OUTPATIENT
Start: 2025-09-02 | End: 2025-09-02 | Stop reason: HOSPADM

## 2025-09-02 RX ORDER — ONDANSETRON 4 MG/1
4 TABLET, ORALLY DISINTEGRATING ORAL EVERY 6 HOURS PRN
Status: DISCONTINUED | OUTPATIENT
Start: 2025-09-02 | End: 2025-09-03 | Stop reason: HOSPADM

## 2025-09-02 RX ORDER — PROCHLORPERAZINE MALEATE 5 MG/1
5 TABLET ORAL EVERY 6 HOURS PRN
Status: DISCONTINUED | OUTPATIENT
Start: 2025-09-02 | End: 2025-09-03 | Stop reason: HOSPADM

## 2025-09-02 RX ORDER — LABETALOL HYDROCHLORIDE 5 MG/ML
10 INJECTION, SOLUTION INTRAVENOUS
Status: DISCONTINUED | OUTPATIENT
Start: 2025-09-02 | End: 2025-09-02 | Stop reason: HOSPADM

## 2025-09-02 RX ORDER — HYDROMORPHONE HYDROCHLORIDE 1 MG/ML
0.4 INJECTION, SOLUTION INTRAMUSCULAR; INTRAVENOUS; SUBCUTANEOUS EVERY 5 MIN PRN
Status: DISCONTINUED | OUTPATIENT
Start: 2025-09-02 | End: 2025-09-02 | Stop reason: HOSPADM

## 2025-09-02 RX ORDER — LIDOCAINE HYDROCHLORIDE 20 MG/ML
INJECTION, SOLUTION INFILTRATION; PERINEURAL PRN
Status: DISCONTINUED | OUTPATIENT
Start: 2025-09-02 | End: 2025-09-02

## 2025-09-02 RX ORDER — BUPROPION HYDROCHLORIDE 150 MG/1
150 TABLET ORAL EVERY MORNING
Status: DISCONTINUED | OUTPATIENT
Start: 2025-09-03 | End: 2025-09-03 | Stop reason: HOSPADM

## 2025-09-02 RX ORDER — ONDANSETRON 2 MG/ML
4 INJECTION INTRAMUSCULAR; INTRAVENOUS EVERY 6 HOURS PRN
Status: DISCONTINUED | OUTPATIENT
Start: 2025-09-02 | End: 2025-09-03 | Stop reason: HOSPADM

## 2025-09-02 RX ORDER — SODIUM CHLORIDE 9 MG/ML
INJECTION, SOLUTION INTRAVENOUS CONTINUOUS
Status: DISCONTINUED | OUTPATIENT
Start: 2025-09-02 | End: 2025-09-03

## 2025-09-02 RX ORDER — DEXTROSE MONOHYDRATE 25 G/50ML
25-50 INJECTION, SOLUTION INTRAVENOUS
Status: DISCONTINUED | OUTPATIENT
Start: 2025-09-02 | End: 2025-09-03 | Stop reason: HOSPADM

## 2025-09-02 RX ORDER — FENTANYL CITRATE 50 UG/ML
INJECTION, SOLUTION INTRAMUSCULAR; INTRAVENOUS PRN
Status: DISCONTINUED | OUTPATIENT
Start: 2025-09-02 | End: 2025-09-02

## 2025-09-02 RX ORDER — ONDANSETRON 4 MG/1
4 TABLET, ORALLY DISINTEGRATING ORAL EVERY 30 MIN PRN
Status: DISCONTINUED | OUTPATIENT
Start: 2025-09-02 | End: 2025-09-02 | Stop reason: HOSPADM

## 2025-09-02 RX ORDER — ONDANSETRON 2 MG/ML
INJECTION INTRAMUSCULAR; INTRAVENOUS PRN
Status: DISCONTINUED | OUTPATIENT
Start: 2025-09-02 | End: 2025-09-02

## 2025-09-02 RX ORDER — PROPOFOL 10 MG/ML
INJECTION, EMULSION INTRAVENOUS PRN
Status: DISCONTINUED | OUTPATIENT
Start: 2025-09-02 | End: 2025-09-02

## 2025-09-02 RX ORDER — OXYCODONE HYDROCHLORIDE 10 MG/1
10 TABLET ORAL EVERY 4 HOURS PRN
Status: DISCONTINUED | OUTPATIENT
Start: 2025-09-02 | End: 2025-09-03 | Stop reason: HOSPADM

## 2025-09-02 RX ORDER — HYDROMORPHONE HYDROCHLORIDE 1 MG/ML
0.4 INJECTION, SOLUTION INTRAMUSCULAR; INTRAVENOUS; SUBCUTANEOUS
Status: DISCONTINUED | OUTPATIENT
Start: 2025-09-02 | End: 2025-09-03 | Stop reason: HOSPADM

## 2025-09-02 RX ORDER — AMOXICILLIN 250 MG
1 CAPSULE ORAL 2 TIMES DAILY
Status: DISCONTINUED | OUTPATIENT
Start: 2025-09-02 | End: 2025-09-03 | Stop reason: HOSPADM

## 2025-09-02 RX ORDER — FENTANYL CITRATE 50 UG/ML
50 INJECTION, SOLUTION INTRAMUSCULAR; INTRAVENOUS EVERY 5 MIN PRN
Status: DISCONTINUED | OUTPATIENT
Start: 2025-09-02 | End: 2025-09-02 | Stop reason: HOSPADM

## 2025-09-02 RX ORDER — ACETAMINOPHEN 325 MG/1
975 TABLET ORAL ONCE
Status: DISCONTINUED | OUTPATIENT
Start: 2025-09-02 | End: 2025-09-02

## 2025-09-02 RX ORDER — POLYETHYLENE GLYCOL 3350 17 G/17G
17 POWDER, FOR SOLUTION ORAL DAILY
Status: DISCONTINUED | OUTPATIENT
Start: 2025-09-03 | End: 2025-09-03 | Stop reason: HOSPADM

## 2025-09-02 RX ORDER — DUTASTERIDE 0.5 MG/1
0.5 CAPSULE, LIQUID FILLED ORAL DAILY
Status: DISCONTINUED | OUTPATIENT
Start: 2025-09-02 | End: 2025-09-03 | Stop reason: HOSPADM

## 2025-09-02 RX ORDER — HYDROMORPHONE HYDROCHLORIDE 1 MG/ML
0.2 INJECTION, SOLUTION INTRAMUSCULAR; INTRAVENOUS; SUBCUTANEOUS EVERY 5 MIN PRN
Status: DISCONTINUED | OUTPATIENT
Start: 2025-09-02 | End: 2025-09-02 | Stop reason: HOSPADM

## 2025-09-02 RX ORDER — NICOTINE POLACRILEX 4 MG
15-30 LOZENGE BUCCAL
Status: DISCONTINUED | OUTPATIENT
Start: 2025-09-02 | End: 2025-09-02

## 2025-09-02 RX ADMIN — SENNOSIDES AND DOCUSATE SODIUM 1 TABLET: 50; 8.6 TABLET ORAL at 20:44

## 2025-09-02 RX ADMIN — SODIUM CHLORIDE: 0.9 INJECTION, SOLUTION INTRAVENOUS at 17:02

## 2025-09-02 RX ADMIN — FENTANYL CITRATE 25 MCG: 50 INJECTION INTRAMUSCULAR; INTRAVENOUS at 09:03

## 2025-09-02 RX ADMIN — CEFAZOLIN 1 G: 1 INJECTION, POWDER, FOR SOLUTION INTRAMUSCULAR; INTRAVENOUS at 18:10

## 2025-09-02 RX ADMIN — LIDOCAINE HYDROCHLORIDE 80 MG: 20 INJECTION, SOLUTION INFILTRATION; PERINEURAL at 08:51

## 2025-09-02 RX ADMIN — FENTANYL CITRATE 50 MCG: 50 INJECTION, SOLUTION INTRAMUSCULAR; INTRAVENOUS at 11:09

## 2025-09-02 RX ADMIN — ONDANSETRON 4 MG: 2 INJECTION INTRAMUSCULAR; INTRAVENOUS at 10:34

## 2025-09-02 RX ADMIN — FENTANYL CITRATE 25 MCG: 50 INJECTION INTRAMUSCULAR; INTRAVENOUS at 09:55

## 2025-09-02 RX ADMIN — OXYCODONE HYDROCHLORIDE 5 MG: 5 TABLET ORAL at 20:43

## 2025-09-02 RX ADMIN — DEXMEDETOMIDINE HYDROCHLORIDE 8 MCG: 100 INJECTION, SOLUTION INTRAVENOUS at 10:36

## 2025-09-02 RX ADMIN — FENTANYL CITRATE 25 MCG: 50 INJECTION INTRAMUSCULAR; INTRAVENOUS at 08:57

## 2025-09-02 RX ADMIN — DUTASTERIDE 0.5 MG: 0.5 CAPSULE, LIQUID FILLED ORAL at 18:10

## 2025-09-02 RX ADMIN — HYDROMORPHONE HYDROCHLORIDE 0.4 MG: 1 INJECTION, SOLUTION INTRAMUSCULAR; INTRAVENOUS; SUBCUTANEOUS at 12:23

## 2025-09-02 RX ADMIN — SODIUM CHLORIDE, SODIUM LACTATE, POTASSIUM CHLORIDE, AND CALCIUM CHLORIDE: .6; .31; .03; .02 INJECTION, SOLUTION INTRAVENOUS at 08:30

## 2025-09-02 RX ADMIN — OXYCODONE HYDROCHLORIDE 5 MG: 5 TABLET ORAL at 13:43

## 2025-09-02 RX ADMIN — ACETAMINOPHEN 975 MG: 325 TABLET ORAL at 07:36

## 2025-09-02 RX ADMIN — FENTANYL CITRATE 50 MCG: 50 INJECTION, SOLUTION INTRAMUSCULAR; INTRAVENOUS at 11:01

## 2025-09-02 RX ADMIN — GENTAMICIN SULFATE 400 MG: 40 INJECTION, SOLUTION INTRAMUSCULAR; INTRAVENOUS at 09:07

## 2025-09-02 RX ADMIN — Medication 1000 MG: at 08:10

## 2025-09-02 RX ADMIN — ACETAMINOPHEN 975 MG: 325 TABLET ORAL at 13:25

## 2025-09-02 RX ADMIN — FENTANYL CITRATE 25 MCG: 50 INJECTION INTRAMUSCULAR; INTRAVENOUS at 09:25

## 2025-09-02 RX ADMIN — HYDROMORPHONE HYDROCHLORIDE 0.2 MG: 1 INJECTION, SOLUTION INTRAMUSCULAR; INTRAVENOUS; SUBCUTANEOUS at 16:59

## 2025-09-02 RX ADMIN — FLUCONAZOLE 400 MG: 2 INJECTION, SOLUTION INTRAVENOUS at 07:54

## 2025-09-02 RX ADMIN — PROPOFOL 200 MG: 10 INJECTION, EMULSION INTRAVENOUS at 08:51

## 2025-09-02 RX ADMIN — HYDROMORPHONE HYDROCHLORIDE 0.4 MG: 1 INJECTION, SOLUTION INTRAMUSCULAR; INTRAVENOUS; SUBCUTANEOUS at 11:21

## 2025-09-02 RX ADMIN — HYDROMORPHONE HYDROCHLORIDE 0.4 MG: 1 INJECTION, SOLUTION INTRAMUSCULAR; INTRAVENOUS; SUBCUTANEOUS at 11:37

## 2025-09-02 RX ADMIN — HYDROMORPHONE HYDROCHLORIDE 0.4 MG: 1 INJECTION, SOLUTION INTRAMUSCULAR; INTRAVENOUS; SUBCUTANEOUS at 12:03

## 2025-09-02 ASSESSMENT — ACTIVITIES OF DAILY LIVING (ADL)
ADLS_ACUITY_SCORE: 35
ADLS_ACUITY_SCORE: 34
ADLS_ACUITY_SCORE: 32
ADLS_ACUITY_SCORE: 18
ADLS_ACUITY_SCORE: 35
ADLS_ACUITY_SCORE: 32
ADLS_ACUITY_SCORE: 32
ADLS_ACUITY_SCORE: 35
ADLS_ACUITY_SCORE: 34
ADLS_ACUITY_SCORE: 32
ADLS_ACUITY_SCORE: 35
ADLS_ACUITY_SCORE: 35
ADLS_ACUITY_SCORE: 34

## 2025-09-03 VITALS
TEMPERATURE: 99 F | RESPIRATION RATE: 16 BRPM | BODY MASS INDEX: 26.3 KG/M2 | DIASTOLIC BLOOD PRESSURE: 68 MMHG | OXYGEN SATURATION: 99 % | HEIGHT: 67 IN | HEART RATE: 98 BPM | WEIGHT: 167.55 LBS | SYSTOLIC BLOOD PRESSURE: 132 MMHG

## 2025-09-03 LAB
ANION GAP SERPL CALCULATED.3IONS-SCNC: 10 MMOL/L (ref 7–15)
BUN SERPL-MCNC: 8.3 MG/DL (ref 8–23)
CALCIUM SERPL-MCNC: 7.9 MG/DL (ref 8.8–10.4)
CHLORIDE SERPL-SCNC: 104 MMOL/L (ref 98–107)
CREAT SERPL-MCNC: 0.85 MG/DL (ref 0.67–1.17)
EGFRCR SERPLBLD CKD-EPI 2021: >90 ML/MIN/1.73M2
ERYTHROCYTE [DISTWIDTH] IN BLOOD BY AUTOMATED COUNT: 13.6 % (ref 10–15)
GLUCOSE BLDC GLUCOMTR-MCNC: 202 MG/DL (ref 70–99)
GLUCOSE SERPL-MCNC: 214 MG/DL (ref 70–99)
HCO3 SERPL-SCNC: 24 MMOL/L (ref 22–29)
HCT VFR BLD AUTO: 31.9 % (ref 40–53)
HGB BLD-MCNC: 10.8 G/DL (ref 13.3–17.7)
MCH RBC QN AUTO: 31.1 PG (ref 26.5–33)
MCHC RBC AUTO-ENTMCNC: 33.9 G/DL (ref 31.5–36.5)
MCV RBC AUTO: 91.9 FL (ref 78–100)
PLATELET # BLD AUTO: 187 10E3/UL (ref 150–450)
POTASSIUM SERPL-SCNC: 4.3 MMOL/L (ref 3.4–5.3)
RBC # BLD AUTO: 3.47 10E6/UL (ref 4.4–5.9)
SODIUM SERPL-SCNC: 138 MMOL/L (ref 135–145)
WBC # BLD AUTO: 14.26 10E3/UL (ref 4–11)

## 2025-09-03 PROCEDURE — 258N000003 HC RX IP 258 OP 636

## 2025-09-03 PROCEDURE — 85049 AUTOMATED PLATELET COUNT: CPT

## 2025-09-03 PROCEDURE — 250N000013 HC RX MED GY IP 250 OP 250 PS 637

## 2025-09-03 PROCEDURE — 82962 GLUCOSE BLOOD TEST: CPT

## 2025-09-03 PROCEDURE — 250N000011 HC RX IP 250 OP 636: Mod: JW

## 2025-09-03 PROCEDURE — 84132 ASSAY OF SERUM POTASSIUM: CPT

## 2025-09-03 PROCEDURE — 36415 COLL VENOUS BLD VENIPUNCTURE: CPT

## 2025-09-03 RX ORDER — OXYCODONE HYDROCHLORIDE 5 MG/1
5 TABLET ORAL EVERY 6 HOURS PRN
Qty: 6 TABLET | Refills: 0 | Status: CANCELLED | OUTPATIENT
Start: 2025-09-03 | End: 2025-09-06

## 2025-09-03 RX ORDER — HYDROMORPHONE HYDROCHLORIDE 1 MG/ML
0.4 INJECTION, SOLUTION INTRAMUSCULAR; INTRAVENOUS; SUBCUTANEOUS ONCE
Status: COMPLETED | OUTPATIENT
Start: 2025-09-03 | End: 2025-09-03

## 2025-09-03 RX ORDER — OXYCODONE HYDROCHLORIDE 5 MG/1
5 TABLET ORAL EVERY 6 HOURS PRN
Qty: 6 TABLET | Refills: 0 | Status: SHIPPED | OUTPATIENT
Start: 2025-09-03 | End: 2025-09-03

## 2025-09-03 RX ORDER — SULFAMETHOXAZOLE AND TRIMETHOPRIM 800; 160 MG/1; MG/1
1 TABLET ORAL 2 TIMES DAILY
Qty: 14 TABLET | Refills: 0 | Status: SHIPPED | OUTPATIENT
Start: 2025-09-03 | End: 2025-09-10

## 2025-09-03 RX ORDER — GLIMEPIRIDE 4 MG/1
4 TABLET ORAL
Status: DISCONTINUED | OUTPATIENT
Start: 2025-09-03 | End: 2025-09-03 | Stop reason: HOSPADM

## 2025-09-03 RX ORDER — SENNA AND DOCUSATE SODIUM 50; 8.6 MG/1; MG/1
1 TABLET, FILM COATED ORAL 2 TIMES DAILY PRN
Qty: 20 TABLET | Refills: 0 | Status: SHIPPED | OUTPATIENT
Start: 2025-09-03

## 2025-09-03 RX ADMIN — HYDROMORPHONE HYDROCHLORIDE 0.4 MG: 1 INJECTION, SOLUTION INTRAMUSCULAR; INTRAVENOUS; SUBCUTANEOUS at 06:22

## 2025-09-03 RX ADMIN — HYDROMORPHONE HYDROCHLORIDE 0.2 MG: 1 INJECTION, SOLUTION INTRAMUSCULAR; INTRAVENOUS; SUBCUTANEOUS at 00:08

## 2025-09-03 RX ADMIN — METFORMIN HYDROCHLORIDE 2000 MG: 500 TABLET, EXTENDED RELEASE ORAL at 09:42

## 2025-09-03 RX ADMIN — GLIMEPIRIDE 4 MG: 4 TABLET ORAL at 09:43

## 2025-09-03 RX ADMIN — SENNOSIDES AND DOCUSATE SODIUM 1 TABLET: 50; 8.6 TABLET ORAL at 08:22

## 2025-09-03 RX ADMIN — BUPROPION HYDROCHLORIDE 150 MG: 150 TABLET, EXTENDED RELEASE ORAL at 08:22

## 2025-09-03 RX ADMIN — POLYETHYLENE GLYCOL 3350 17 G: 17 POWDER, FOR SOLUTION ORAL at 08:22

## 2025-09-03 RX ADMIN — DUTASTERIDE 0.5 MG: 0.5 CAPSULE, LIQUID FILLED ORAL at 08:23

## 2025-09-03 RX ADMIN — CEFAZOLIN 1 G: 1 INJECTION, POWDER, FOR SOLUTION INTRAMUSCULAR; INTRAVENOUS at 10:01

## 2025-09-03 RX ADMIN — SODIUM CHLORIDE: 0.9 INJECTION, SOLUTION INTRAVENOUS at 00:09

## 2025-09-03 RX ADMIN — CEFAZOLIN 1 G: 1 INJECTION, POWDER, FOR SOLUTION INTRAMUSCULAR; INTRAVENOUS at 01:38

## 2025-09-03 ASSESSMENT — ACTIVITIES OF DAILY LIVING (ADL)
ADLS_ACUITY_SCORE: 18
ADLS_ACUITY_SCORE: 23
ADLS_ACUITY_SCORE: 18
ADLS_ACUITY_SCORE: 25
ADLS_ACUITY_SCORE: 18
ADLS_ACUITY_SCORE: 25
ADLS_ACUITY_SCORE: 18
ADLS_ACUITY_SCORE: 23
ADLS_ACUITY_SCORE: 23

## (undated) DEVICE — SU PDS II 2-0 CT-2 27"  Z333H

## (undated) DEVICE — TUBING SUCTION MEDI-VAC 1/4"X20' N620A

## (undated) DEVICE — DRSG KERLIX 4 1/2"X4YDS ROLL 6730

## (undated) DEVICE — COVER CAMERA IN-LIGHT DISP LT-C02

## (undated) DEVICE — DECANTER FLUID L3 IN TRANSFER STRL LF DYNJDEC03

## (undated) DEVICE — SU MONOCRYL 4-0 PS-2 18" UND Y496G

## (undated) DEVICE — SYR 50ML LL W/O NDL 309653

## (undated) DEVICE — LINEN ORTHO PACK 5446

## (undated) DEVICE — POSITIONER ARMBOARD FOAM CONVOLUTE CP-501

## (undated) DEVICE — DRAIN RESERVOIR 100ML JP 0070740

## (undated) DEVICE — GLOVE PROTEXIS MICRO 7.5 LT BLUE 2D73PM75

## (undated) DEVICE — DRAPE MAYO STAND 23X54 8337

## (undated) DEVICE — SYR BULB IRRIG DOVER 60 ML LATEX FREE 67000

## (undated) DEVICE — ESU GROUND PAD UNIVERSAL W/O CORD

## (undated) DEVICE — BLADE CLIPPER DISP 4406

## (undated) DEVICE — SYR 10ML LL W/O NDL 302995

## (undated) DEVICE — DRAPE IOBAN INCISE 13X13" 6640EZ

## (undated) DEVICE — PLUG CATH AND DRAIN TUBE PROTECTOR DYND12200

## (undated) DEVICE — SUCTION MANIFOLD NEPTUNE 2 SYS 4 PORT 0702-020-000

## (undated) DEVICE — STRAP POSITIONING 60X31" BODY KNEE KBS 01

## (undated) DEVICE — GLOVE PROTEXIS BLUE W/NEU-THERA 8.0  2D73EB80

## (undated) DEVICE — SUPPORTER ATHLETIC LG LATEX 202636

## (undated) DEVICE — SU VICRYL 2-0 SH 27" UND J417H

## (undated) DEVICE — LINEN GOWN X4 5410

## (undated) DEVICE — PREP CHLORAPREP 26ML TINTED HI-LITE ORANGE 930815

## (undated) DEVICE — Device

## (undated) DEVICE — SOLUTION IV 0.9% NACL 1000ML E8000

## (undated) DEVICE — WIPES FOLEY CARE SURESTEP PROVON DFC100

## (undated) DEVICE — RETR PENILE WILSON XL 12"  TLC-5042-M

## (undated) DEVICE — SU ETHILON 2-0 PS 18" 585H

## (undated) DEVICE — UNDERPAD 36X30 PREMIERPRO MAX ABS NS LF 676111

## (undated) DEVICE — CATH TRAY FOLEY SURESTEP 16FR WDRAIN BAG STLK LATEX A300316A

## (undated) DEVICE — NDL ECLIPSE 25GA 1.5"

## (undated) DEVICE — LINEN TOWEL PACK X5 5464

## (undated) DEVICE — DRAPE LAP TRANSVERSE 29421

## (undated) DEVICE — SUCTION TIP YANKAUER W/O VENT K86

## (undated) DEVICE — SU PROLENE 2-0 SHDA 36" 8523H

## (undated) DEVICE — SOLUTION WATER 1000ML BOTTLE R5000-01

## (undated) DEVICE — APPLICATORS COTTON TIP 6"X2 STERILE LF C15053-006

## (undated) RX ORDER — ACETAMINOPHEN 325 MG/1
TABLET ORAL
Status: DISPENSED
Start: 2025-09-02

## (undated) RX ORDER — PROPOFOL 10 MG/ML
INJECTION, EMULSION INTRAVENOUS
Status: DISPENSED
Start: 2025-09-02

## (undated) RX ORDER — HYDROMORPHONE HYDROCHLORIDE 1 MG/ML
INJECTION, SOLUTION INTRAMUSCULAR; INTRAVENOUS; SUBCUTANEOUS
Status: DISPENSED
Start: 2025-09-02

## (undated) RX ORDER — OXYCODONE HYDROCHLORIDE 5 MG/1
TABLET ORAL
Status: DISPENSED
Start: 2025-09-02

## (undated) RX ORDER — FENTANYL CITRATE 50 UG/ML
INJECTION, SOLUTION INTRAMUSCULAR; INTRAVENOUS
Status: DISPENSED
Start: 2025-09-02

## (undated) RX ORDER — BUPIVACAINE HYDROCHLORIDE 5 MG/ML
INJECTION, SOLUTION EPIDURAL; INTRACAUDAL; PERINEURAL
Status: DISPENSED
Start: 2025-09-02